# Patient Record
Sex: MALE | Race: BLACK OR AFRICAN AMERICAN | NOT HISPANIC OR LATINO | Employment: FULL TIME | URBAN - METROPOLITAN AREA
[De-identification: names, ages, dates, MRNs, and addresses within clinical notes are randomized per-mention and may not be internally consistent; named-entity substitution may affect disease eponyms.]

---

## 2017-03-09 ENCOUNTER — OFFICE VISIT (OUTPATIENT)
Dept: INTERNAL MEDICINE | Facility: CLINIC | Age: 47
End: 2017-03-09

## 2017-03-09 ENCOUNTER — TELEPHONE (OUTPATIENT)
Dept: INTERNAL MEDICINE | Facility: CLINIC | Age: 47
End: 2017-03-09

## 2017-03-09 VITALS
HEART RATE: 78 BPM | BODY MASS INDEX: 37.19 KG/M2 | DIASTOLIC BLOOD PRESSURE: 87 MMHG | SYSTOLIC BLOOD PRESSURE: 142 MMHG | HEIGHT: 71 IN | WEIGHT: 265.63 LBS | OXYGEN SATURATION: 97 %

## 2017-03-09 DIAGNOSIS — Z00.00 ANNUAL PHYSICAL EXAM: Primary | ICD-10-CM

## 2017-03-09 DIAGNOSIS — N18.4 STAGE 4 CHRONIC KIDNEY DISEASE: ICD-10-CM

## 2017-03-09 DIAGNOSIS — N18.4 STAGE 4 CHRONIC KIDNEY DISEASE: Primary | ICD-10-CM

## 2017-03-09 DIAGNOSIS — E55.9 VITAMIN D DEFICIENCY: ICD-10-CM

## 2017-03-09 PROCEDURE — 99999 PR PBB SHADOW E&M-NEW PATIENT-LVL III: CPT | Mod: PBBFAC,,, | Performed by: INTERNAL MEDICINE

## 2017-03-09 PROCEDURE — 99386 PREV VISIT NEW AGE 40-64: CPT | Mod: S$PBB,,, | Performed by: INTERNAL MEDICINE

## 2017-03-09 PROCEDURE — 99203 OFFICE O/P NEW LOW 30 MIN: CPT | Mod: PBBFAC | Performed by: INTERNAL MEDICINE

## 2017-03-09 RX ORDER — FUROSEMIDE 20 MG/1
20 TABLET ORAL DAILY
Qty: 30 TABLET | Refills: 3 | Status: SHIPPED | OUTPATIENT
Start: 2017-03-09 | End: 2017-09-06 | Stop reason: SDUPTHER

## 2017-03-09 RX ORDER — ERGOCALCIFEROL 1.25 MG/1
50000 CAPSULE ORAL
Qty: 12 CAPSULE | Refills: 3 | Status: SHIPPED | OUTPATIENT
Start: 2017-03-09 | End: 2019-11-15 | Stop reason: SDUPTHER

## 2017-03-09 RX ORDER — CALCITRIOL 0.25 UG/1
0.25 CAPSULE ORAL DAILY
COMMUNITY
End: 2017-03-09 | Stop reason: SDUPTHER

## 2017-03-09 RX ORDER — SODIUM BICARBONATE 650 MG/1
650 TABLET ORAL 4 TIMES DAILY
COMMUNITY
End: 2017-03-09

## 2017-03-09 RX ORDER — SODIUM BICARBONATE 650 MG/1
1300 TABLET ORAL 2 TIMES DAILY
Qty: 120 TABLET | Refills: 3 | Status: SHIPPED | OUTPATIENT
Start: 2017-03-09 | End: 2017-11-11 | Stop reason: SDUPTHER

## 2017-03-09 RX ORDER — ERGOCALCIFEROL 1.25 MG/1
50000 CAPSULE ORAL
COMMUNITY
End: 2017-03-09 | Stop reason: SDUPTHER

## 2017-03-09 RX ORDER — LISINOPRIL 40 MG/1
40 TABLET ORAL DAILY
COMMUNITY
End: 2017-03-09 | Stop reason: SDUPTHER

## 2017-03-09 RX ORDER — CALCITRIOL 0.25 UG/1
0.25 CAPSULE ORAL DAILY
Qty: 30 CAPSULE | Refills: 3 | Status: SHIPPED | OUTPATIENT
Start: 2017-03-09 | End: 2017-09-06 | Stop reason: SDUPTHER

## 2017-03-09 RX ORDER — LISINOPRIL 40 MG/1
40 TABLET ORAL DAILY
Qty: 30 TABLET | Refills: 3 | Status: SHIPPED | OUTPATIENT
Start: 2017-03-09 | End: 2017-09-06 | Stop reason: SDUPTHER

## 2017-03-09 RX ORDER — FUROSEMIDE 20 MG/1
20 TABLET ORAL DAILY
COMMUNITY
End: 2017-03-09 | Stop reason: SDUPTHER

## 2017-03-09 NOTE — Clinical Note
Dr. Cantu,  I saw this patient today to establish care as PCP - [facility]  He is seeing you on 4/10/17 to establish care.  He has CKD stage 4 from hypertension - it sounds like [facility] was discussing dialysis in the future.  I have ordered some basic labs for him to have drawn but wanted to check with you to see if you wanted me to add anything specific.  He does not plan to have labs drawn until after 4/1/17 when his insurance kicks in.  Thanks so much, Katharina Chang

## 2017-03-09 NOTE — TELEPHONE ENCOUNTER
----- Message from Misti Cantu MD sent at 3/9/2017  5:36 PM CST -----  I added some labs when he has your set completed. thanks  ----- Message -----     From: Katharina Chang MD     Sent: 3/9/2017   1:00 PM       To: MD Dr. Pepe Wray,    I saw this patient today to establish care as PCP - [facility].  He is seeing you on 4/10/17 to establish care.  He has CKD stage 4 from hypertension - it sounds like [facility] was discussing dialysis in the future.  I have ordered some basic labs for him to have drawn but wanted to check with you to see if you wanted me to add anything specific.  He does not plan to have labs drawn until after 4/1/17 when his insurance kicks in.    Thanks so much,  Katharina Chang

## 2017-03-09 NOTE — TELEPHONE ENCOUNTER
Please call patient and let him know that Dr. Cantu (his future nephrologist) has also ordered some labs for him to have drawn prior to his appointment.  He can have my labs and her labs drawn all on the same day.  He will need to be fasting.  Please call him to schedule this lab appointment.

## 2017-03-09 NOTE — PROGRESS NOTES
Internal Medicine    Subjective:      Patient ID: Misael Owens is a 47 y.o. male.    Chief Complaint: Establish Care    HPI Comments: Presents to establish care.  [facility]  Dr. Bailey was nephrologist.      CKD stage 4:  Diagnosed 10/2015.  Woke up with swelling and went to ER.  Admitted and had kidney biopsy.  States he was diagnosed with CKD stage 4 due to hypertension.  Currently taking Calcitriol 0.25mg daily, Ergocalciferol 50,000 units weekly, Lasix 20mg daily, lisinopril 40mg daily, sodium bicarb 1300mg BID.  He needs refills of all medications and referral to Nephrology.  Girlfriend states that his nephrologist at [facility] was discussing possible dialysis.  He denies complaints today.  Feels his medications are working well.  Denies shortness of breath, palpitations, chest pain, or LE edema.  Denies trouble with urination.        Review of Systems   Constitutional: Negative for appetite change, chills, fatigue, fever and unexpected weight change.   HENT: Negative for congestion, ear pain, hearing loss, rhinorrhea, sore throat and trouble swallowing.    Eyes: Negative for visual disturbance.   Respiratory: Negative for cough, chest tightness, shortness of breath and wheezing.    Cardiovascular: Negative for chest pain, palpitations and leg swelling.   Gastrointestinal: Negative for abdominal pain, blood in stool, constipation, diarrhea, nausea and vomiting.   Genitourinary: Negative for dysuria, frequency, hematuria and urgency.   Musculoskeletal: Negative for arthralgias and myalgias.   Skin: Negative for rash and wound.   Neurological: Negative for dizziness, weakness, numbness and headaches.   Hematological: Negative for adenopathy.   Psychiatric/Behavioral: Negative for behavioral problems.       Past Medical History:   Diagnosis Date    CKD (chronic kidney disease), stage IV     due to hypertension     Past Surgical History:   Procedure Laterality Date    RENAL BIOPSY       Family  "History   Problem Relation Age of Onset    Hypertension Mother     Diabetes Mother     Lung cancer Maternal Uncle     Heart disease Other     Diabetes Other     Stroke Neg Hx      Social History   Substance Use Topics    Smoking status: Never Smoker    Smokeless tobacco: Never Used    Alcohol use No       Medications and allergies reviewed.     Objective:     BP (!) 142/87  Pulse 78  Ht 5' 11" (1.803 m)  Wt 120.5 kg (265 lb 10.5 oz)  SpO2 97%  BMI 37.05 kg/m2  Physical Exam   Constitutional: He is oriented to person, place, and time. He appears well-developed and well-nourished. No distress.   HENT:   Head: Normocephalic and atraumatic.   Eyes: Conjunctivae and EOM are normal. No scleral icterus.   Neck: No thyromegaly present.   Cardiovascular: Normal rate and regular rhythm.  Exam reveals no gallop and no friction rub.    No murmur heard.  Pulmonary/Chest: Effort normal and breath sounds normal. No respiratory distress. He has no wheezes. He has no rales.   Abdominal: Soft. Bowel sounds are normal. He exhibits no distension and no mass. There is no tenderness. There is no rebound and no guarding.   Musculoskeletal: He exhibits no edema or tenderness.   Lymphadenopathy:     He has no cervical adenopathy.   Neurological: He is alert and oriented to person, place, and time. No cranial nerve deficit.   Skin: No rash noted. No erythema.   Psychiatric: He has a normal mood and affect. His behavior is normal.       Assessment:     1. Annual physical exam    2. Stage 4 chronic kidney disease        Plan:   Misael was seen today for establish care.    Diagnoses and all orders for this visit:    Annual physical exam  -     CBC auto differential; Future; Expected date: 3/9/17  -     Comprehensive metabolic panel; Future; Expected date: 3/9/17  -     Hemoglobin A1c; Future; Expected date: 3/9/17  -     Vitamin D; Future; Expected date: 3/9/17  -     Lipid panel; Future; Expected date: 3/9/17  -     TSH; Future; " Expected date: 3/9/17    Stage 4 chronic kidney disease  -     Ambulatory Referral to Nephrology  -     calcitRIOL (ROCALTROL) 0.25 MCG Cap; Take 1 capsule (0.25 mcg total) by mouth once daily.  -     ergocalciferol (ERGOCALCIFEROL) 50,000 unit Cap; Take 1 capsule (50,000 Units total) by mouth every 7 days.  -     furosemide (LASIX) 20 MG tablet; Take 1 tablet (20 mg total) by mouth once daily.  -     lisinopril (PRINIVIL,ZESTRIL) 40 MG tablet; Take 1 tablet (40 mg total) by mouth once daily.  -     sodium bicarbonate 650 MG tablet; Take 2 tablets (1,300 mg total) by mouth 2 (two) times daily.    Health maintenance reviewed with patient.     Return in about 3 months (around 6/9/2017).    Katharina Chang MD  Internal Medicine  [facility]

## 2017-03-09 NOTE — MR AVS SNAPSHOT
[facility]  [address]  [facility] [address]  Phone: [phone]  Fax: [phone]                  Misael Owens   3/9/2017 9:00 AM   Office Visit    Description:  Male : 1970   Provider:  Katharina Chang MD   Department:  [facility]           Reason for Visit     Establish Care           Diagnoses this Visit        Comments    Annual physical exam    -  Primary     Stage 4 chronic kidney disease                To Do List           Future Appointments        Provider Department Dept Phone    4/10/2017 8:30 AM Misti Cantu MD [facility] - Nephrology [phone]    2017 8:00 AM Katharina Chang MD [facility] Internal Medicine [phone]      Goals (5 Years of Data)     None      Follow-Up and Disposition     Return in about 3 months (around 2017).       These Medications        Disp Refills Start End    calcitRIOL (ROCALTROL) 0.25 MCG Cap 30 capsule 3 3/9/2017     Take 1 capsule (0.25 mcg total) by mouth once daily. - Oral    Pharmacy: [id number] [place] - [address] Ph #: [phone]       ergocalciferol (ERGOCALCIFEROL) 50,000 unit Cap 12 capsule 3 3/9/2017     Take 1 capsule (50,000 Units total) by mouth every 7 days. - Oral    Ph[facility]acy: [id number] - [address] Ph #: [phone]       furosemide (LASIX) 20 MG tablet 30 tablet 3 3/9/2017     Take 1 tablet (20 mg total) by mouth once daily. - Oral    Ph[facility]acy: [id number] - [address] Ph #: [phone]       lisinopril (PRINIVIL,ZESTRIL) 40 MG tablet 30 tablet 3 3/9/2017     Take 1 tablet (40 mg total) by mouth once daily. - Oral    Ph[facility]acy: [id number] - [address] Ph #: [phone]       sodium bicarbonate 650 MG tablet 120 tablet 3 3/9/2017 3/9/2018    Take 2 tablets (1,300 mg total) by mouth 2 (two) times daily. - Oral    Pharmacy: [id number] - [place] [address]  [facility] #: [phone]         [facility] On Call     [facility] On Call Nurse Care Line - 24/7 Assistance  Registered nurses in the [facility] On Call Center provide clinical advisement, health education, appointment booking, and other advisory services.  Call for this free service at [phone].             Medications           Message regarding Medications     Verify the changes and/or additions to your medication regime listed below are the same as discussed with your clinician today.  If any of these changes or additions are incorrect, please notify your healthcare provider.        START taking these NEW medications        Refills    calcitRIOL (ROCALTROL) 0.25 MCG Cap 3    Sig: Take 1 capsule (0.25 mcg total) by mouth once daily.    Class: Normal    Route: Oral    ergocalciferol (ERGOCALCIFEROL) 50,000 unit Cap 3    Sig: Take 1 capsule (50,000 Units total) by mouth every 7 days.    Class: Normal    Route: Oral    furosemide (LASIX) 20 MG tablet 3    Sig: Take 1 tablet (20 mg total) by mouth once daily.    Class: Normal    Route: Oral    lisinopril (PRINIVIL,ZESTRIL) 40 MG tablet 3    Sig: Take 1 tablet (40 mg total) by mouth once daily.    Class: Normal    Route: Oral    sodium bicarbonate 650 MG tablet 3    Sig: Take 2 tablets (1,300 mg total) by mouth 2 (two) times daily.    Class: Normal    Route: Oral           Verify that the below list of medications is an accurate representation of the medications you are currently taking.  If none reported, the list may be blank. If incorrect, please contact your healthcare provider. Carry this list with you in case of emergency.           Current Medications     calcitRIOL (ROCALTROL) 0.25 MCG Cap Take 1 capsule (0.25 mcg total) by mouth once daily.    ergocalciferol (ERGOCALCIFEROL) 50,000 unit Cap Take 1 capsule (50,000 Units total) by mouth every 7 days.    furosemide (LASIX) 20 MG tablet Take 1 tablet (20 mg total) by mouth once daily.    lisinopril  "(PRINIVIL,ZESTRIL) 40 MG tablet Take 1 tablet (40 mg total) by mouth once daily.    sodium bicarbonate 650 MG tablet Take 2 tablets (1,300 mg total) by mouth 2 (two) times daily.           Clinical Reference Information           Your Vitals Were     BP Pulse Height Weight SpO2 BMI    142/87 78 5' 11" (1.803 m) 120.5 kg (265 lb 10.5 oz) 97% 37.05 kg/m2      Blood Pressure          Most Recent Value    BP  (!)  142/87      Allergies as of 3/9/2017     No Known Allergies      Immunizations Administered on Date of Encounter - 3/9/2017     None      Orders Placed During Today's Visit      Normal Orders This Visit    Ambulatory Referral to Nephrology     Future Labs/Procedures Expected by Expires    CBC auto differential  3/9/2017 5/8/2018    Comprehensive metabolic panel  3/9/2017 5/8/2018    Hemoglobin A1c  3/9/2017 5/8/2018    Lipid panel  3/9/2017 5/8/2018    TSH  3/9/2017 5/8/2018    Vitamin D  3/9/2017 5/8/2018      [facility] Sign-Up     Activating your [facility] account is as easy as 1-2-3!     1) Visit [email or web address], select Sign Up Now, enter this activation code and your date of birth, then select Next.  [email or web address]  Expires: 4/23/2017  9:42 AM      2) Create a username and password to use when you visit [facility] in the future and select a security question in case you lose your password and select Next.    3) Enter your e-mail address and click Sign Up!    Additional Information  If you have questions, please e-mail [email or web address] or call [phone] to talk to our [facility] staff. Remember, [facility] is NOT to be used for urgent needs. For medical emergencies, dial 911.         Language Assistance Services     ATTENTION: Language assistance services are available, free of charge. Please call [phone].      ATENCIÓN: Si habla español, tiene a arita disposición servicios gratuitos de asistencia lingüística. Llame al [phone].     CHÚ Ý: N?u b?n nói Ti?ng Vi?t, có các d?ch v? h? tr? ngôn " ng? mi?n phí dành cho b?n. G?i s? [phone].         [facility] complies with applicable Federal civil rights laws and does not discriminate on the basis of race, color, national origin, age, disability, or sex.

## 2017-09-06 DIAGNOSIS — N18.4 STAGE 4 CHRONIC KIDNEY DISEASE: ICD-10-CM

## 2017-09-07 RX ORDER — FUROSEMIDE 20 MG/1
20 TABLET ORAL DAILY
Qty: 30 TABLET | Refills: 3 | Status: SHIPPED | OUTPATIENT
Start: 2017-09-07 | End: 2020-01-06 | Stop reason: CLARIF

## 2017-09-07 RX ORDER — CALCITRIOL 0.25 UG/1
0.25 CAPSULE ORAL DAILY
Qty: 30 CAPSULE | Refills: 3 | Status: SHIPPED | OUTPATIENT
Start: 2017-09-07 | End: 2020-02-05

## 2017-09-07 RX ORDER — LISINOPRIL 40 MG/1
40 TABLET ORAL DAILY
Qty: 30 TABLET | Refills: 3 | Status: SHIPPED | OUTPATIENT
Start: 2017-09-07 | End: 2019-12-09

## 2017-11-11 DIAGNOSIS — N18.4 STAGE 4 CHRONIC KIDNEY DISEASE: ICD-10-CM

## 2017-11-13 RX ORDER — SODIUM BICARBONATE 650 MG/1
TABLET ORAL
Qty: 120 TABLET | Refills: 0 | Status: SHIPPED | OUTPATIENT
Start: 2017-11-13 | End: 2020-03-17 | Stop reason: SDUPTHER

## 2019-11-15 ENCOUNTER — OFFICE VISIT (OUTPATIENT)
Dept: INTERNAL MEDICINE | Facility: CLINIC | Age: 49
End: 2019-11-15
Payer: MEDICAID

## 2019-11-15 ENCOUNTER — IMMUNIZATION (OUTPATIENT)
Dept: INTERNAL MEDICINE | Facility: CLINIC | Age: 49
End: 2019-11-15
Payer: MEDICAID

## 2019-11-15 VITALS
SYSTOLIC BLOOD PRESSURE: 130 MMHG | HEART RATE: 82 BPM | OXYGEN SATURATION: 99 % | DIASTOLIC BLOOD PRESSURE: 82 MMHG | WEIGHT: 258.38 LBS | BODY MASS INDEX: 36.04 KG/M2

## 2019-11-15 DIAGNOSIS — N18.4 STAGE 4 CHRONIC KIDNEY DISEASE: ICD-10-CM

## 2019-11-15 DIAGNOSIS — D63.1 ANEMIA DUE TO STAGE 5 CHRONIC KIDNEY DISEASE, NOT ON CHRONIC DIALYSIS: ICD-10-CM

## 2019-11-15 DIAGNOSIS — E55.9 VITAMIN D DEFICIENCY: ICD-10-CM

## 2019-11-15 DIAGNOSIS — I12.0 CKD STAGE 5 SECONDARY TO HYPERTENSION: Primary | ICD-10-CM

## 2019-11-15 DIAGNOSIS — N18.5 ANEMIA DUE TO STAGE 5 CHRONIC KIDNEY DISEASE, NOT ON CHRONIC DIALYSIS: ICD-10-CM

## 2019-11-15 DIAGNOSIS — I10 ESSENTIAL HYPERTENSION: ICD-10-CM

## 2019-11-15 DIAGNOSIS — N18.5 CKD STAGE 5 SECONDARY TO HYPERTENSION: Primary | ICD-10-CM

## 2019-11-15 DIAGNOSIS — N25.81 SECONDARY HYPERPARATHYROIDISM: ICD-10-CM

## 2019-11-15 PROCEDURE — 99214 OFFICE O/P EST MOD 30 MIN: CPT | Mod: S$PBB,,, | Performed by: INTERNAL MEDICINE

## 2019-11-15 PROCEDURE — 90686 IIV4 VACC NO PRSV 0.5 ML IM: CPT | Mod: PBBFAC

## 2019-11-15 PROCEDURE — 99214 PR OFFICE/OUTPT VISIT, EST, LEVL IV, 30-39 MIN: ICD-10-PCS | Mod: S$PBB,,, | Performed by: INTERNAL MEDICINE

## 2019-11-15 PROCEDURE — 99999 PR PBB SHADOW E&M-EST. PATIENT-LVL III: ICD-10-PCS | Mod: PBBFAC,,, | Performed by: INTERNAL MEDICINE

## 2019-11-15 PROCEDURE — 99213 OFFICE O/P EST LOW 20 MIN: CPT | Mod: PBBFAC,25 | Performed by: INTERNAL MEDICINE

## 2019-11-15 PROCEDURE — 99999 PR PBB SHADOW E&M-EST. PATIENT-LVL III: CPT | Mod: PBBFAC,,, | Performed by: INTERNAL MEDICINE

## 2019-11-15 RX ORDER — FUROSEMIDE 20 MG/1
20 TABLET ORAL DAILY
Qty: 90 TABLET | Refills: 1 | Status: SHIPPED | OUTPATIENT
Start: 2019-11-15 | End: 2019-12-09 | Stop reason: DRUGHIGH

## 2019-11-15 RX ORDER — FUROSEMIDE 20 MG/1
20 TABLET ORAL DAILY
COMMUNITY
End: 2019-11-15 | Stop reason: SDUPTHER

## 2019-11-15 RX ORDER — LABETALOL 200 MG/1
200 TABLET, FILM COATED ORAL EVERY 12 HOURS
Refills: 4 | COMMUNITY
Start: 2019-10-17 | End: 2021-01-15 | Stop reason: SDUPTHER

## 2019-11-15 RX ORDER — ERGOCALCIFEROL 1.25 MG/1
50000 CAPSULE ORAL
Qty: 12 CAPSULE | Refills: 3 | Status: SHIPPED | OUTPATIENT
Start: 2019-11-15 | End: 2021-01-15 | Stop reason: SDUPTHER

## 2019-11-15 NOTE — PROGRESS NOTES
Internal Medicine    Subjective:      Patient ID: Misael Owens is a 49 y.o. male.    Chief Complaint: Annual Exam    HPI:  Patient presents for follow up appointment.  The patient's last visit with me was on 3/9/2017.  He presents today accompanied by his wife.    Was living in [place] and just moved back to [place].  Wanting to re-establish at [facility].      CKD stage 5, due to HTN:  [facility] [place] (requesting records today).  He reports no recent changes.  Last GFR 15 around February per wife.  Diagnosed 10/2015.  Woke up with swelling and went to ER.  Admitted and had kidney biopsy.  Diagnosed with CKD stage 5 due to hypertension.  Currently taking Calcitriol 0.25mg daily, Ergocalciferol 50,000 units weekly, Lasix 20mg daily, Labetalol 200mg BID, and sodium bicarb 1300mg BID (only taking 1 tablet twice a day currently).  No longer on lisinopril.    HTN:  Taking Labetalol 200mg BID and Lasix 20mg daily.      Secondary hyperparathyroidism.  Taking vitamin D and calcitriol.      Anemia of chronic kidney disease    Vitamin D deficiency:  Taking Ergocalciferol 50,000 units weekly.    Last Nephrology note at [facility] on 4/13/16:  1. 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 HTN: controlled  -continue lasix 20mg & lisinopril 40mg every day    3. Anemia of CKD: clinically stable 12/2015 iron panel(iron 78, transferrin 217, TIBC 282, tsat 28%, ferritin 573)  -continue to monitor    4. Secondary hyperparathyroidism: PTH= 184, vitamin D 25 18.4 on 2/2016  -continue calcitriol .25mcg every day & Q1wk ergocalciferol 50,000units      Review of Systems   Constitutional: Negative for appetite change, chills, fatigue, fever and unexpected weight change.   HENT:  Negative for sore throat and trouble swallowing.    Eyes: Negative for visual disturbance.   Respiratory: Negative for cough, chest tightness, shortness of breath and wheezing.    Cardiovascular: Negative for chest pain, palpitations and leg swelling.   Gastrointestinal: Negative for abdominal pain, blood in stool, constipation, diarrhea, nausea and vomiting.   Genitourinary: Negative for difficulty urinating.   Musculoskeletal: Negative for arthralgias and myalgias.        Occasional muscle cramps in legs   Skin: Negative for rash and wound.   Neurological: Negative for dizziness, weakness, numbness and headaches.   Psychiatric/Behavioral: Negative for behavioral problems and confusion.       Past medical history, surgical history, and family medical history reviewed and updated as appropriate.    Medications and allergies reviewed.     Objective:     Vitals:    11/15/19 1108 11/15/19 1226   BP: (!) 142/90 130/82   BP Location: Right arm    Patient Position: Sitting    BP Method: Large (Manual)    Pulse: 82    SpO2: 99%    Weight: 117.2 kg (258 lb 6.1 oz)      Physical Exam   Constitutional: He is oriented to person, place, and time. He appears well-developed and well-nourished. No distress.   HENT:   Head: Normocephalic and atraumatic.   Eyes: Conjunctivae and EOM are normal. No scleral icterus.   Neck: No thyromegaly present.   Cardiovascular: Normal rate and regular rhythm. Exam reveals no gallop and no friction rub.   No murmur heard.  Pulmonary/Chest: Effort normal and breath sounds normal. No respiratory distress. He has no wheezes. He has no rales.   Abdominal: Soft. He exhibits no distension and no mass. There is no tenderness. There is no rebound and no guarding.   Musculoskeletal: He exhibits no edema or tenderness.   Lymphadenopathy:     He has no cervical adenopathy.   Neurological: He is alert and oriented to person, place, and time.   Skin: No rash noted. No erythema.   Psychiatric: He has a normal  mood and affect. His behavior is normal.       RESULTS:   Lab Results   Component Value Date    WBC 7.37 11/15/2019    HGB 11.6 (L) 11/15/2019    HCT 35.6 (L) 11/15/2019    MCV 87 11/15/2019     11/15/2019     BMP  No results found for: NA, K, CL, CO2, BUN, CREATININE, CALCIUM, ANIONGAP, ESTGFRAFRICA, EGFRNONAA  No results found for: ALT, AST, GGT, ALKPHOS, BILITOT  No results found for: TSH  No results found for: LABA1C, HGBA1C      Assessment:     1. CKD stage 5 secondary to hypertension    2. Essential hypertension    3. Vitamin D deficiency    4. Secondary hyperparathyroidism    5. Anemia due to stage 5 chronic kidney disease, not on chronic dialysis        Plan:     *Continue medication/plan as discussed in HPI except for changes discussed below.    Misael was seen today for annual exam.    Diagnoses and all orders for this visit:    CKD stage 5 secondary to hypertension  -     CBC auto differential; Future; Expected date: 11/15/2019  -     Comprehensive metabolic panel; Future; Expected date: 11/15/2019  -     Hemoglobin A1c; Future; Expected date: 11/15/2019  -     Vitamin D; Future; Expected date: 11/15/2019  -     TSH; Future; Expected date: 11/15/2019  -     PTH, intact; Future; Expected date: 11/15/2019  -     LDL Cholesterol, Direct Measurement; Future; Expected date: 11/15/2019  -     Ambulatory Referral to Nephrology  -     Lipid panel; Future; Expected date: 02/15/2020    Essential hypertension  -     Comprehensive metabolic panel; Future; Expected date: 11/15/2019  -     TSH; Future; Expected date: 11/15/2019  -     LDL Cholesterol, Direct Measurement; Future; Expected date: 11/15/2019  -     Lipid panel; Future; Expected date: 02/15/2020    Vitamin D deficiency  -     Vitamin D; Future; Expected date: 11/15/2019    Secondary hyperparathyroidism  -     Vitamin D; Future; Expected date: 11/15/2019  -     PTH, intact; Future; Expected date: 11/15/2019  -     Ambulatory Referral to  Nephrology    Anemia due to stage 5 chronic kidney disease, not on chronic dialysis  -     CBC auto differential; Future; Expected date: 11/15/2019  -     Ambulatory Referral to Nephrology    Health maintenance reviewed with patient.     Follow up in about 3 months (around 2/15/2020).    Katharina Ricci MD  Internal Medicine  [facility]

## 2019-11-18 ENCOUNTER — TELEPHONE (OUTPATIENT)
Dept: INTERNAL MEDICINE | Facility: CLINIC | Age: 49
End: 2019-11-18

## 2019-11-18 NOTE — TELEPHONE ENCOUNTER
----- Message from Katharina Ricci MD sent at 11/15/2019  3:32 PM CST -----  Please call patient and let him know that his kidney function seems to have worsened.  His Creatinine is 9.3 and his GFR is 7 (wife today said it was previously 15 in February).  His bicarb is low, so I recommend that he increase the sodium bicarb to 2 tablets twice daily as instructed by his previous Nephrologist.  He is also slightly anemic.  I would recommend scheduling appointment with a Nephrologist very soon.  I will send a message to the Nephrology Department here and see if they can see him.  However, I am unsure if they are currently taking Medicaid.  We will call him again next week when his other labs are back.

## 2019-11-19 NOTE — TELEPHONE ENCOUNTER
Spoke with pt, notified him of results, pt verbalized understanding. I'm unable to schedule pt with Nephrology, pt will contact department to schedule.

## 2019-12-09 ENCOUNTER — OFFICE VISIT (OUTPATIENT)
Dept: NEPHROLOGY | Facility: CLINIC | Age: 49
End: 2019-12-09
Payer: MEDICAID

## 2019-12-09 ENCOUNTER — TELEPHONE (OUTPATIENT)
Dept: NEPHROLOGY | Facility: CLINIC | Age: 49
End: 2019-12-09

## 2019-12-09 VITALS
BODY MASS INDEX: 35.52 KG/M2 | OXYGEN SATURATION: 98 % | DIASTOLIC BLOOD PRESSURE: 90 MMHG | SYSTOLIC BLOOD PRESSURE: 132 MMHG | HEART RATE: 76 BPM | HEIGHT: 71 IN | WEIGHT: 253.75 LBS

## 2019-12-09 DIAGNOSIS — I10 ESSENTIAL HYPERTENSION: ICD-10-CM

## 2019-12-09 DIAGNOSIS — N18.5 CKD STAGE 5 SECONDARY TO HYPERTENSION: Primary | ICD-10-CM

## 2019-12-09 DIAGNOSIS — I12.0 CKD STAGE 5 SECONDARY TO HYPERTENSION: Primary | ICD-10-CM

## 2019-12-09 DIAGNOSIS — N25.81 SECONDARY HYPERPARATHYROIDISM: ICD-10-CM

## 2019-12-09 DIAGNOSIS — Z01.818 PREOP EXAMINATION: Primary | ICD-10-CM

## 2019-12-09 PROCEDURE — 99205 PR OFFICE/OUTPT VISIT, NEW, LEVL V, 60-74 MIN: ICD-10-PCS | Mod: S$PBB,,, | Performed by: INTERNAL MEDICINE

## 2019-12-09 PROCEDURE — 99999 PR PBB SHADOW E&M-EST. PATIENT-LVL IV: CPT | Mod: PBBFAC,,, | Performed by: INTERNAL MEDICINE

## 2019-12-09 PROCEDURE — 99205 OFFICE O/P NEW HI 60 MIN: CPT | Mod: S$PBB,,, | Performed by: INTERNAL MEDICINE

## 2019-12-09 PROCEDURE — 99999 PR PBB SHADOW E&M-EST. PATIENT-LVL IV: ICD-10-PCS | Mod: PBBFAC,,, | Performed by: INTERNAL MEDICINE

## 2019-12-09 PROCEDURE — 99214 OFFICE O/P EST MOD 30 MIN: CPT | Mod: PBBFAC | Performed by: INTERNAL MEDICINE

## 2019-12-09 RX ORDER — FUROSEMIDE 40 MG/1
40 TABLET ORAL DAILY
Qty: 90 TABLET | Refills: 3 | Status: SHIPPED | OUTPATIENT
Start: 2019-12-09 | End: 2021-01-15 | Stop reason: SDUPTHER

## 2019-12-09 NOTE — TELEPHONE ENCOUNTER
----- Message from Katharina Martini LPN sent at 12/9/2019  9:28 AM CST -----  Pt would like to get in with nephrology on the [facility]  due to that's where they live now former pt of Dr. Moreno/please contact Wendy Small with appt or any questions thanks

## 2019-12-09 NOTE — TELEPHONE ENCOUNTER
I spoke with the patient and informed no available apopintments through 2021.  It would not allow me to try to schedule him as a new patient.

## 2019-12-09 NOTE — PROGRESS NOTES
REASON FOR CONSULTATION:  Evaluation of chronic kidney disease stage IV.    HISTORY OF PRESENT ILLNESS:  This is a 49-year-old -American male with   longstanding history of hypertension diagnosed in 2015 when he had lower   extremity edema, was evaluated for CKD with nephrotic syndrome.  He has never   been in the [facility] system before but was followed at the [place] several   years ago and went to [place] and since then has lost follow up for the   past two years with a nephrologist.  He was biopsied appears to be in 2015 and   was found to have hypertensive kidney disease per the patient and unable to   retrieve the records, although 2016 note stated diabetic glomerulosclerosis   along with questionable FSGS, but the patient does not have diabetes.  It is   unclear if this is correct documentation.  Her blood pressures are mainly in the   130s/80s per the patient.  He has occasional nausea off and on for the last   year, but no vomiting and denies any other uremic symptoms.  He has good   appetite.  No major weight changes.  His creatinine is elevated at 9+.  It is   not clear what his creatinines were prior to that.    PAST MEDICAL HISTORY:  Significant for hypertension diagnosed in 2015, but   likely has had it for a longer period of time, advanced CKD stage IV, nephrotic   syndrome.    SOCIAL HISTORY:  Does not smoke, does not drink.  He is a ,   lives with a friend currently.    FAMILY HISTORY:  No family history of kidney disease.    REVIEW OF SYSTEMS:  Per HPI.    PHYSICAL EXAMINATION:  GENERAL:  Well-nourished, well-developed individual, in no acute distress.  HEENT:  PERRLA, EOMI.  NECK:  No cervical lymphadenopathy.  CARDIOVASCULAR:  Rate and rhythm regular.  No murmurs, gallops or rubs.  LUNGS:  Clear to auscultation bilaterally.  Normal respiratory effort.  ABDOMEN:  Soft, nontender and nondistended.  EXTREMITIES:  No edema.    ASSESSMENT AND PLAN:  This is a  49-year-old -American male with   longstanding history of hypertension and nephrotic syndrome.  He is come in for   evaluation of CKD stage IV.    1.  CKD stage IV, likely secondary to ____ hypertensive nephropathy per the   patient for which he was told about the biopsy that was done in 2015.  He has   advanced kidney disease and does not have access preparation.  We will refer him   to Vascular Surgery for graft, so he could be started on dialysis soon as he is   having nausea off and on.  We will refer him to transplant.  He was educated   about his advanced kidney disease and he is aware of uremic symptoms.  He would   like to do home dialysis, but currently does not have a home.  We will refer him   to [facility] as lives in ____ right now and would be   closer to him.  We will also refer him to ____.  2.  Anemia.  We will recheck.  His hemoglobin recently was 11.6.  3.  Renal osteodystrophy.  His intact PTH is 354.  He is on calcitriol.  Vitamin   D levels are normal.  4.  Hypertension.  Blood pressures are stable currently on labetalol and we will   increase his Lasix from 20 mg to 40 mg for better blood pressure control and 20   mg a small dose for his level of kidney function.  He was educated about   low-potassium, low-phosphorus diet and was given the list.  We will refer him to   see Dr. Simental or Dr. Klein in the [place] so he can be transitioned to   dialysis closer to where he is living currently.      SOFY/PACO  dd: 12/09/2019 09:17:51 (CST)  td: 12/10/2019 00:49:22 (CST)  Doc ID   #[id number]  Job ID #[id number]    CC:     [id number]

## 2019-12-09 NOTE — TELEPHONE ENCOUNTER
----- Message from Bianca Martini sent at 12/9/2019 12:38 PM CST -----  Contact: pt  .Type:  Patient Returning Call    Who Called:pt  Who Left Message for Patient:Mer  Does the patient know what this is regarding?:no  Would the patient rather a call back or a response via [facility]? Call back  Best Call Back Number:[phone]  Additional Information:

## 2019-12-10 ENCOUNTER — TELEPHONE (OUTPATIENT)
Dept: NEPHROLOGY | Facility: CLINIC | Age: 49
End: 2019-12-10

## 2019-12-10 NOTE — TELEPHONE ENCOUNTER
Preferred Name:   Misael Owens  Male, 49 y.o., 1970  Phone:   [phone] (M)  PCP:   Katharina Ricci MD  Language:   English  Need Interp:   No  Allergies Last Reviewed:   12/09/19  Allergies:   No Known Allergies  Health Maintenance:   Due  Primary Ins.:   MEDICAID  MRN:   [id number]  Pt Comm Pref:   Patient Portal, Mail  Last MyChart Login:   12/10/2019 8:47 AM  Next Appt:   With Vascular Surgery  12/18/2019 at 2:00 PM  My Sticky Note:     Specialty Comments:     Patient Calls     Matthew Moreno DO routed conversation to Josh Castro Staff 4 hours ago (10:50 AM)      Matthew Moreno DO 4 hours ago (10:50 AM)         Please let him know that his kidney function is similiar to lat time at 7%.         Documentation

## 2019-12-10 NOTE — TELEPHONE ENCOUNTER
----- Message from Delphine Eduardo sent at 12/10/2019  8:21 AM CST -----  Contact: Destinee at [facility]  Type: Needs Medical Advice    Who Called: Destinee at [facility]  Best Call Back Number: [phone]  Additional Information: Calling to speak to the nurse about getting patient seen in the office.

## 2019-12-10 NOTE — TELEPHONE ENCOUNTER
Tong roberson.  This is a patient I saw yesterday as a new consult and he is close to HD and [facility] is closer to him as he lives in [place] and they would like to see someone there to make it easier.  We were trying to get him in with you or elicia for jorge luis  but was told that you were booked-is there any way you can see him in January.  Thanks.

## 2019-12-16 ENCOUNTER — TELEPHONE (OUTPATIENT)
Dept: NEPHROLOGY | Facility: CLINIC | Age: 49
End: 2019-12-16

## 2019-12-16 ENCOUNTER — PATIENT MESSAGE (OUTPATIENT)
Dept: NEPHROLOGY | Facility: CLINIC | Age: 49
End: 2019-12-16

## 2019-12-16 NOTE — TELEPHONE ENCOUNTER
Preferred Name:   Misael wOens  Male, 49 y.o., 1970  Phone:   [phone] (M)  PCP:   Katharina Ricci MD  Language:   English  Need Interp:   No  Allergies Last Reviewed:   12/09/19  Allergies:   No Known Allergies  Health Maintenance:   Due  Primary Ins.:   MEDICAID  MRN:   [id number]  Pt Comm Pref:   Patient Portal, Mail  Last MyChart Login:   12/16/2019 4:16 PM  Next Appt:   With Vascular Surgery  12/18/2019 at 2:00 PM  My Sticky Note:     Specialty Comments:     Visit Follow-Up   Misael Owens   Sent: Mon December 16, 2019  8:27 AM   To: ALYSSA Castro Staff   Visit Follow-Up     Misael Owens Shirisha, DO 7 hours ago (8:27 AM)         I received a call from the Nephrologists on the [facility] and they are not able to accommodate me.  I would like to continue to use you as my Nephrologist. Do we need to set up an appointment earlier than the on the scheduled now in February?          Responsible Party     Pool - Bodana Shirisha Staff  No one has taken responsibility for this message.

## 2019-12-18 ENCOUNTER — HOSPITAL ENCOUNTER (OUTPATIENT)
Dept: VASCULAR SURGERY | Facility: CLINIC | Age: 49
Discharge: HOME | End: 2019-12-18
Attending: SURGERY
Payer: MEDICAID

## 2019-12-18 ENCOUNTER — HOSPITAL ENCOUNTER (OUTPATIENT)
Dept: RADIOLOGY | Facility: HOSPITAL | Age: 49
Discharge: HOME | End: 2019-12-18
Attending: INTERNAL MEDICINE
Payer: MEDICAID

## 2019-12-18 ENCOUNTER — OFFICE VISIT (OUTPATIENT)
Dept: VASCULAR SURGERY | Facility: CLINIC | Age: 49
End: 2019-12-18
Attending: SURGERY
Payer: MEDICAID

## 2019-12-18 ENCOUNTER — HOSPITAL ENCOUNTER (OUTPATIENT)
Dept: RADIOLOGY | Facility: HOSPITAL | Age: 49
Discharge: HOME | End: 2019-12-18
Attending: SURGERY
Payer: MEDICAID

## 2019-12-18 VITALS
SYSTOLIC BLOOD PRESSURE: 134 MMHG | BODY MASS INDEX: 35.71 KG/M2 | WEIGHT: 255.06 LBS | DIASTOLIC BLOOD PRESSURE: 84 MMHG | TEMPERATURE: 98 F | HEART RATE: 90 BPM | HEIGHT: 71 IN

## 2019-12-18 DIAGNOSIS — I12.0 CKD STAGE 5 SECONDARY TO HYPERTENSION: ICD-10-CM

## 2019-12-18 DIAGNOSIS — N18.5 CKD STAGE 5 SECONDARY TO HYPERTENSION: ICD-10-CM

## 2019-12-18 DIAGNOSIS — I10 ESSENTIAL HYPERTENSION: ICD-10-CM

## 2019-12-18 DIAGNOSIS — Z01.818 PREOP EXAMINATION: Primary | ICD-10-CM

## 2019-12-18 DIAGNOSIS — N25.81 SECONDARY HYPERPARATHYROIDISM: ICD-10-CM

## 2019-12-18 DIAGNOSIS — N18.5 CKD (CHRONIC KIDNEY DISEASE) STAGE 5, GFR LESS THAN 15 ML/MIN: Primary | ICD-10-CM

## 2019-12-18 DIAGNOSIS — Z01.818 PREOP EXAMINATION: ICD-10-CM

## 2019-12-18 PROCEDURE — 71046 X-RAY EXAM CHEST 2 VIEWS: CPT | Mod: TC

## 2019-12-18 PROCEDURE — 99999 PR PBB SHADOW E&M-EST. PATIENT-LVL IV: ICD-10-PCS | Mod: PBBFAC,,, | Performed by: SURGERY

## 2019-12-18 PROCEDURE — 71046 X-RAY EXAM CHEST 2 VIEWS: CPT | Mod: 26,,, | Performed by: RADIOLOGY

## 2019-12-18 PROCEDURE — 76770 US EXAM ABDO BACK WALL COMP: CPT | Mod: 26,,, | Performed by: RADIOLOGY

## 2019-12-18 PROCEDURE — 93970 EXTREMITY STUDY: CPT | Mod: 26,S$PBB,, | Performed by: SURGERY

## 2019-12-18 PROCEDURE — 71046 XR CHEST PA AND LATERAL: ICD-10-PCS | Mod: 26,,, | Performed by: RADIOLOGY

## 2019-12-18 PROCEDURE — 99204 PR OFFICE/OUTPT VISIT, NEW, LEVL IV, 45-59 MIN: ICD-10-PCS | Mod: S$PBB,,, | Performed by: SURGERY

## 2019-12-18 PROCEDURE — 93970 PR US DUPLEX, UPPER OR LOWER EXT VENOUS,COMPLETE BILAT: ICD-10-PCS | Mod: 26,S$PBB,, | Performed by: SURGERY

## 2019-12-18 PROCEDURE — 99204 OFFICE O/P NEW MOD 45 MIN: CPT | Mod: S$PBB,,, | Performed by: SURGERY

## 2019-12-18 PROCEDURE — 99999 PR PBB SHADOW E&M-EST. PATIENT-LVL IV: CPT | Mod: PBBFAC,,, | Performed by: SURGERY

## 2019-12-18 PROCEDURE — 93970 EXTREMITY STUDY: CPT | Mod: PBBFAC | Performed by: SURGERY

## 2019-12-18 PROCEDURE — 76770 US KIDNEY: ICD-10-PCS | Mod: 26,,, | Performed by: RADIOLOGY

## 2019-12-18 PROCEDURE — 76770 US EXAM ABDO BACK WALL COMP: CPT | Mod: TC

## 2019-12-18 PROCEDURE — 99214 OFFICE O/P EST MOD 30 MIN: CPT | Mod: PBBFAC,25 | Performed by: SURGERY

## 2019-12-18 NOTE — PROGRESS NOTES
Misael Owens  12/18/2019    HPI:  Mr Owens is a 49 y.o. male with a h/o HTN and CKD who is here today for evaluation of AV fistula/graft placement.    He was first diagnosed with CKD in 2015 and has been following nephrology at [facility] then to [place] and now his new nephrologist is Dr. Moreno at [facility].  The patient states that he is right handed.  No major surgeries to the hands or arms.  No radiation to the chest or arms.  Never had a central line placed in either internal jugular.  He is still producing urine urinating 6-7x a day.  His last GFR on 12/9 was 7.4 placing him at CKD stage V.  He denies any shortness of breath or chest pain.  He does have leg swelling which results in cramps.    No MI/stroke  Tobacco use: Never    Past Medical History:   Diagnosis Date    CKD (chronic kidney disease), stage IV     due to hypertension     Past Surgical History:   Procedure Laterality Date    RENAL BIOPSY       Family History   Problem Relation Age of Onset    Hypertension Mother     Diabetes Mother     Lung cancer Maternal Uncle     Heart disease Other     Diabetes Other     Stroke Neg Hx      Social History     Socioeconomic History    Marital status: Significant Other     Spouse name: Not on file    Number of children: Not on file    Years of education: Not on file    Highest education level: Not on file   Occupational History    Not on file   Social Needs    Financial resource strain: Not on file    Food insecurity:     Worry: Not on file     Inability: Not on file    Transportation needs:     Medical: Not on file     Non-medical: Not on file   Tobacco Use    Smoking status: Never Smoker    Smokeless tobacco: Never Used   Substance and Sexual Activity    Alcohol use: No    Drug use: No    Sexual activity: Not on file   Lifestyle    Physical activity:     Days per week: Not on file     Minutes per session: Not on file    Stress: Not on file   Relationships    Social connections:      Talks on phone: Not on file     Gets together: Not on file     Attends Church service: Not on file     Active member of club or organization: Not on file     Attends meetings of clubs or organizations: Not on file     Relationship status: Not on file   Other Topics Concern    Not on file   Social History Narrative    Not on file       Current Outpatient Medications:     calcitRIOL (ROCALTROL) 0.25 MCG Cap, Take 1 capsule (0.25 mcg total) by mouth once daily., Disp: 30 capsule, Rfl: 3    ergocalciferol (ERGOCALCIFEROL) 50,000 unit Cap, Take 1 capsule (50,000 Units total) by mouth every 7 days., Disp: 12 capsule, Rfl: 3    furosemide (LASIX) 40 MG tablet, Take 1 tablet (40 mg total) by mouth once daily., Disp: 90 tablet, Rfl: 3    labetalol (NORMODYNE) 200 MG tablet, Take 200 mg by mouth every 12 (twelve) hours., Disp: , Rfl: 4    sodium bicarbonate 650 MG tablet, TAKE TWO TABLETS BY MOUTH TWICE DAILY, Disp: 120 tablet, Rfl: 0    flu vacc [id number] 6mos up,PF, 60 mcg (15 mcg x 4)/0.5 mL Syrg, inject as directed (Patient not taking: Reported on 2019), Disp: 0.5 mL, Rfl: 0    furosemide (LASIX) 20 MG tablet, Take 1 tablet (20 mg total) by mouth once daily. (Patient not taking: Reported on 2019), Disp: 30 tablet, Rfl: 3     REVIEW OF SYSTEMS:  General: negative, occassional chills; Respiratory: no cough, shortness of breath, or wheezing; Cardiovascular: no chest pain or dyspnea on exertion; Gastrointestinal: some abdominal pain/muscle spasms, no change in bowel habits, or bloody stools; Genito-Urinary: no dysuria, trouble voiding, or hematuria; Musculoskeletal: no weakness or decreased range of motion, Neurological: no TIA or stroke symptoms; Psychiatric: no nervousness, anxiety or depression.    PHYSICAL EXAM:   Right Arm BP - Sittin/84 (19 1515)  Left Arm BP - Sittin/87 (19 1515)  Pulse: 90  Temp: 98.4 °F (36.9 °C)    General appearance: Alert, well-appearing, and in no  distress.  Oriented to person, place, and time  Neurological: Normal speech, no focal findings noted; CN II - XII grossly intact  Musculoskeletal:Digits/nail without cyanosis/clubbing.  Normal muscle strength/tone.  Neck: Supple, no significant adenopathy, no carotid bruit can be auscultated  Chest:Clear to auscultation, no wheezes, rales or rhonchi, symmetric air entry, No use of accessory muscles   Cardiac:Normal rate and regular rhythm, S1 and S2 normal  Abdomen:Soft, nontender, nondistended, no masses or organomegaly, No rebound tenderness noted; bowel sounds normal, Pulsatile aortic mass is not palpable. No groin adenopathy   Extremities: 2+ femoral pulses bilaterally, 2+ pedal pulses palpable.1+ pre-tibial edema. No ulcerations    LAB RESULTS:  Lab Results   Component Value Date    K 4.9 12/09/2019    K 5.1 11/15/2019    CREATININE 8.7 (H) 12/09/2019    CREATININE 9.3 (H) 11/15/2019     Lab Results   Component Value Date    WBC 7.37 11/15/2019    HCT 36.7 (L) 12/09/2019    HCT 35.6 (L) 11/15/2019     11/15/2019     Lab Results   Component Value Date    HGBA1C 5.2 11/15/2019     IMAGING:  Vein mapping - adequate left cephalic and basilic vein for AVF creation    IMP/PLAN:  49 y.o. male with CKD stage V in need of permanent HD access.  Right handed.  Adequate left cephalic and basilic vein on duplex for AVF creation.  Plan for lyle vs brachiocephalic AVF in the coming weeks.  We discussed the procedure in detail including risks and benefits and he wishes to proceed.     1) plan for LUE AVF creation in the coming weeks.     Derrek Grant MD  Vascular & Endovascular Surgery

## 2019-12-18 NOTE — H&P (VIEW-ONLY)
Misael Owens  12/18/2019    HPI:  Mr Owens is a 49 y.o. male with a h/o HTN and CKD who is here today for evaluation of AV fistula/graft placement.    He was first diagnosed with CKD in 2015 and has been following nephrology at [place] then to [place] and now his new nephrologist is Dr. Moreno at [facility].  The patient states that he is right handed.  No major surgeries to the hands or arms.  No radiation to the chest or arms.  Never had a central line placed in either internal jugular.  He is still producing urine urinating 6-7x a day.  His last GFR on 12/9 was 7.4 placing him at CKD stage V.  He denies any shortness of breath or chest pain.  He does have leg swelling which results in cramps.    No MI/stroke  Tobacco use: Never    Past Medical History:   Diagnosis Date    CKD (chronic kidney disease), stage IV     due to hypertension     Past Surgical History:   Procedure Laterality Date    RENAL BIOPSY       Family History   Problem Relation Age of Onset    Hypertension Mother     Diabetes Mother     Lung cancer Maternal Uncle     Heart disease Other     Diabetes Other     Stroke Neg Hx      Social History     Socioeconomic History    Marital status: Significant Other     Spouse name: Not on file    Number of children: Not on file    Years of education: Not on file    Highest education level: Not on file   Occupational History    Not on file   Social Needs    Financial resource strain: Not on file    Food insecurity:     Worry: Not on file     Inability: Not on file    Transportation needs:     Medical: Not on file     Non-medical: Not on file   Tobacco Use    Smoking status: Never Smoker    Smokeless tobacco: Never Used   Substance and Sexual Activity    Alcohol use: No    Drug use: No    Sexual activity: Not on file   Lifestyle    Physical activity:     Days per week: Not on file     Minutes per session: Not on file    Stress: Not on file   Relationships    Social connections:      Talks on phone: Not on file     Gets together: Not on file     Attends Protestant service: Not on file     Active member of club or organization: Not on file     Attends meetings of clubs or organizations: Not on file     Relationship status: Not on file   Other Topics Concern    Not on file   Social History Narrative    Not on file       Current Outpatient Medications:     calcitRIOL (ROCALTROL) 0.25 MCG Cap, Take 1 capsule (0.25 mcg total) by mouth once daily., Disp: 30 capsule, Rfl: 3    ergocalciferol (ERGOCALCIFEROL) 50,000 unit Cap, Take 1 capsule (50,000 Units total) by mouth every 7 days., Disp: 12 capsule, Rfl: 3    furosemide (LASIX) 40 MG tablet, Take 1 tablet (40 mg total) by mouth once daily., Disp: 90 tablet, Rfl: 3    labetalol (NORMODYNE) 200 MG tablet, Take 200 mg by mouth every 12 (twelve) hours., Disp: , Rfl: 4    sodium bicarbonate 650 MG tablet, TAKE TWO TABLETS BY MOUTH TWICE DAILY, Disp: 120 tablet, Rfl: 0    flu vacc [id number] 6mos up,PF, 60 mcg (15 mcg x 4)/0.5 mL Syrg, inject as directed (Patient not taking: Reported on 2019), Disp: 0.5 mL, Rfl: 0    furosemide (LASIX) 20 MG tablet, Take 1 tablet (20 mg total) by mouth once daily. (Patient not taking: Reported on 2019), Disp: 30 tablet, Rfl: 3     REVIEW OF SYSTEMS:  General: negative, occassional chills; Respiratory: no cough, shortness of breath, or wheezing; Cardiovascular: no chest pain or dyspnea on exertion; Gastrointestinal: some abdominal pain/muscle spasms, no change in bowel habits, or bloody stools; Genito-Urinary: no dysuria, trouble voiding, or hematuria; Musculoskeletal: no weakness or decreased range of motion, Neurological: no TIA or stroke symptoms; Psychiatric: no nervousness, anxiety or depression.    PHYSICAL EXAM:   Right Arm BP - Sittin/84 (19 1515)  Left Arm BP - Sittin/87 (19 1515)  Pulse: 90  Temp: 98.4 °F (36.9 °C)    General appearance: Alert, well-appearing, and in no  distress.  Oriented to person, place, and time  Neurological: Normal speech, no focal findings noted; CN II - XII grossly intact  Musculoskeletal:Digits/nail without cyanosis/clubbing.  Normal muscle strength/tone.  Neck: Supple, no significant adenopathy, no carotid bruit can be auscultated  Chest:Clear to auscultation, no wheezes, rales or rhonchi, symmetric air entry, No use of accessory muscles   Cardiac:Normal rate and regular rhythm, S1 and S2 normal  Abdomen:Soft, nontender, nondistended, no masses or organomegaly, No rebound tenderness noted; bowel sounds normal, Pulsatile aortic mass is not palpable. No groin adenopathy   Extremities: 2+ femoral pulses bilaterally, 2+ pedal pulses palpable.1+ pre-tibial edema. No ulcerations    LAB RESULTS:  Lab Results   Component Value Date    K 4.9 12/09/2019    K 5.1 11/15/2019    CREATININE 8.7 (H) 12/09/2019    CREATININE 9.3 (H) 11/15/2019     Lab Results   Component Value Date    WBC 7.37 11/15/2019    HCT 36.7 (L) 12/09/2019    HCT 35.6 (L) 11/15/2019     11/15/2019     Lab Results   Component Value Date    HGBA1C 5.2 11/15/2019     IMAGING:  Vein mapping - adequate left cephalic and basilic vein for AVF creation    IMP/PLAN:  49 y.o. male with CKD stage V in need of permanent HD access.  Right handed.  Adequate left cephalic and basilic vein on duplex for AVF creation.  Plan for lyle vs brachiocephalic AVF in the coming weeks.  We discussed the procedure in detail including risks and benefits and he wishes to proceed.     1) plan for LUE AVF creation in the coming weeks.     Derrek Grant MD  Vascular & Endovascular Surgery

## 2019-12-19 ENCOUNTER — TELEPHONE (OUTPATIENT)
Dept: TRANSPLANT | Facility: CLINIC | Age: 49
End: 2019-12-19

## 2019-12-23 ENCOUNTER — HOSPITAL ENCOUNTER (OUTPATIENT)
Dept: CARDIOLOGY | Facility: CLINIC | Age: 49
Discharge: HOME | End: 2019-12-23
Attending: SURGERY
Payer: MEDICAID

## 2019-12-23 DIAGNOSIS — Z01.818 PREOP EXAMINATION: ICD-10-CM

## 2019-12-23 PROCEDURE — 93010 ELECTROCARDIOGRAM REPORT: CPT | Mod: S$PBB,NTX,, | Performed by: INTERNAL MEDICINE

## 2019-12-23 PROCEDURE — 93010 EKG 12-LEAD: ICD-10-PCS | Mod: S$PBB,NTX,, | Performed by: INTERNAL MEDICINE

## 2019-12-23 PROCEDURE — 93005 ELECTROCARDIOGRAM TRACING: CPT | Mod: PBBFAC,NTX | Performed by: INTERNAL MEDICINE

## 2019-12-24 ENCOUNTER — TELEPHONE (OUTPATIENT)
Dept: NEPHROLOGY | Facility: CLINIC | Age: 49
End: 2019-12-24

## 2019-12-24 DIAGNOSIS — N18.5 STAGE 5 CHRONIC KIDNEY DISEASE: Primary | ICD-10-CM

## 2020-01-06 ENCOUNTER — TELEPHONE (OUTPATIENT)
Dept: VASCULAR SURGERY | Facility: CLINIC | Age: 50
End: 2020-01-06

## 2020-01-06 ENCOUNTER — ANESTHESIA EVENT (OUTPATIENT)
Dept: SURGERY | Facility: HOSPITAL | Age: 50
End: 2020-01-06
Payer: MEDICAID

## 2020-01-06 NOTE — PRE-PROCEDURE INSTRUCTIONS
Preop instructions:       For Peripheral Vascular Surgery patients - you may only have sips of water with your AM medications one (1) hour prior to your arrival to the hospital.    If you have received specific instructions from your Surgeon/Surgeon's staff, please follow their instructions.     Showering instructions, directions, leave all valuables at home, medication instructions for PM prior & am of procedure explained. Patient stated an understanding.      Patient denies any side effects or issues with anesthesia or sedation.                                                                                                                                    ARRIVAL TIME TO [facility] - 0830   PT'S WIFE - KAMLA BURGER WILL BE PROVIDING TRANSPORTATION HOME UPON DISCHARGE.

## 2020-01-07 ENCOUNTER — HOSPITAL ENCOUNTER (OUTPATIENT)
Facility: HOSPITAL | Age: 50
Discharge: HOME | End: 2020-01-07
Attending: SURGERY | Admitting: SURGERY
Payer: MEDICAID

## 2020-01-07 ENCOUNTER — ANESTHESIA (OUTPATIENT)
Dept: SURGERY | Facility: HOSPITAL | Age: 50
End: 2020-01-07
Payer: MEDICAID

## 2020-01-07 VITALS
WEIGHT: 260 LBS | HEIGHT: 71 IN | RESPIRATION RATE: 15 BRPM | DIASTOLIC BLOOD PRESSURE: 85 MMHG | BODY MASS INDEX: 36.4 KG/M2 | SYSTOLIC BLOOD PRESSURE: 145 MMHG | HEART RATE: 69 BPM | OXYGEN SATURATION: 100 % | TEMPERATURE: 98 F

## 2020-01-07 DIAGNOSIS — N18.9 CHRONIC KIDNEY DISEASE (CKD): Primary | ICD-10-CM

## 2020-01-07 DIAGNOSIS — N18.6 END STAGE RENAL FAILURE ON DIALYSIS: Primary | ICD-10-CM

## 2020-01-07 DIAGNOSIS — Z99.2 END STAGE RENAL FAILURE ON DIALYSIS: Primary | ICD-10-CM

## 2020-01-07 PROCEDURE — 63600175 PHARM REV CODE 636 W HCPCS: Mod: TXP | Performed by: STUDENT IN AN ORGANIZED HEALTH CARE EDUCATION/TRAINING PROGRAM

## 2020-01-07 PROCEDURE — D9220A PRA ANESTHESIA: Mod: CRNA,NTX,, | Performed by: NURSE ANESTHETIST, CERTIFIED REGISTERED

## 2020-01-07 PROCEDURE — 25000003 PHARM REV CODE 250: Mod: NTX | Performed by: NURSE ANESTHETIST, CERTIFIED REGISTERED

## 2020-01-07 PROCEDURE — 01844 ANES VASC SHUNT/SHUNT REVJ: CPT | Mod: NTX | Performed by: SURGERY

## 2020-01-07 PROCEDURE — 25000003 PHARM REV CODE 250: Mod: TXP | Performed by: SURGERY

## 2020-01-07 PROCEDURE — S0028 INJECTION, FAMOTIDINE, 20 MG: HCPCS | Mod: NTX | Performed by: NURSE ANESTHETIST, CERTIFIED REGISTERED

## 2020-01-07 PROCEDURE — 71000016 HC POSTOP RECOV ADDL HR: Mod: TXP | Performed by: SURGERY

## 2020-01-07 PROCEDURE — 37000008 HC ANESTHESIA 1ST 15 MINUTES: Mod: TXP | Performed by: SURGERY

## 2020-01-07 PROCEDURE — 36000707: Mod: TXP | Performed by: SURGERY

## 2020-01-07 PROCEDURE — D9220A PRA ANESTHESIA: ICD-10-PCS | Mod: ANES,NTX,, | Performed by: ANESTHESIOLOGY

## 2020-01-07 PROCEDURE — 71000044 HC DOSC ROUTINE RECOVERY FIRST HOUR: Mod: NTX | Performed by: SURGERY

## 2020-01-07 PROCEDURE — 36821 PR ANASTOMOSIS,AV,ANY SITE: ICD-10-PCS | Mod: NTX,,, | Performed by: SURGERY

## 2020-01-07 PROCEDURE — D9220A PRA ANESTHESIA: ICD-10-PCS | Mod: CRNA,NTX,, | Performed by: NURSE ANESTHETIST, CERTIFIED REGISTERED

## 2020-01-07 PROCEDURE — 37000009 HC ANESTHESIA EA ADD 15 MINS: Mod: NTX | Performed by: SURGERY

## 2020-01-07 PROCEDURE — 71000015 HC POSTOP RECOV 1ST HR: Mod: TXP | Performed by: SURGERY

## 2020-01-07 PROCEDURE — 36821 AV FUSION DIRECT ANY SITE: CPT | Mod: NTX,,, | Performed by: SURGERY

## 2020-01-07 PROCEDURE — 63600175 PHARM REV CODE 636 W HCPCS: Mod: NTX | Performed by: STUDENT IN AN ORGANIZED HEALTH CARE EDUCATION/TRAINING PROGRAM

## 2020-01-07 PROCEDURE — 36000706: Mod: NTX | Performed by: SURGERY

## 2020-01-07 PROCEDURE — D9220A PRA ANESTHESIA: Mod: ANES,NTX,, | Performed by: ANESTHESIOLOGY

## 2020-01-07 PROCEDURE — 63600175 PHARM REV CODE 636 W HCPCS: Mod: NTX | Performed by: NURSE ANESTHETIST, CERTIFIED REGISTERED

## 2020-01-07 PROCEDURE — 63600175 PHARM REV CODE 636 W HCPCS: Mod: TXP | Performed by: NURSE ANESTHETIST, CERTIFIED REGISTERED

## 2020-01-07 RX ORDER — LIDOCAINE HYDROCHLORIDE 10 MG/ML
INJECTION INFILTRATION; PERINEURAL
Status: DISCONTINUED | OUTPATIENT
Start: 2020-01-07 | End: 2020-01-07 | Stop reason: HOSPADM

## 2020-01-07 RX ORDER — SODIUM CHLORIDE 9 MG/ML
INJECTION, SOLUTION INTRAVENOUS CONTINUOUS PRN
Status: DISCONTINUED | OUTPATIENT
Start: 2020-01-07 | End: 2020-01-07

## 2020-01-07 RX ORDER — SODIUM CHLORIDE 9 MG/ML
INJECTION, SOLUTION INTRAVENOUS CONTINUOUS
Status: DISCONTINUED | OUTPATIENT
Start: 2020-01-07 | End: 2020-10-26

## 2020-01-07 RX ORDER — FENTANYL CITRATE 50 UG/ML
25 INJECTION, SOLUTION INTRAMUSCULAR; INTRAVENOUS EVERY 5 MIN PRN
Status: DISCONTINUED | OUTPATIENT
Start: 2020-01-07 | End: 2020-01-07 | Stop reason: HOSPADM

## 2020-01-07 RX ORDER — LIDOCAINE HYDROCHLORIDE 10 MG/ML
1 INJECTION, SOLUTION EPIDURAL; INFILTRATION; INTRACAUDAL; PERINEURAL ONCE
Status: ACTIVE | OUTPATIENT
Start: 2020-01-07

## 2020-01-07 RX ORDER — FAMOTIDINE 10 MG/ML
INJECTION INTRAVENOUS
Status: DISCONTINUED | OUTPATIENT
Start: 2020-01-07 | End: 2020-01-07

## 2020-01-07 RX ORDER — MIDAZOLAM HYDROCHLORIDE 1 MG/ML
0.5 INJECTION INTRAMUSCULAR; INTRAVENOUS
Status: DISCONTINUED | OUTPATIENT
Start: 2020-01-07 | End: 2020-01-07 | Stop reason: HOSPADM

## 2020-01-07 RX ORDER — LIDOCAINE HCL/PF 100 MG/5ML
SYRINGE (ML) INTRAVENOUS
Status: DISCONTINUED | OUTPATIENT
Start: 2020-01-07 | End: 2020-01-07

## 2020-01-07 RX ORDER — ONDANSETRON 2 MG/ML
4 INJECTION INTRAMUSCULAR; INTRAVENOUS EVERY 12 HOURS PRN
Status: ACTIVE | OUTPATIENT
Start: 2020-01-07

## 2020-01-07 RX ORDER — HEPARIN SODIUM 1000 [USP'U]/ML
INJECTION, SOLUTION INTRAVENOUS; SUBCUTANEOUS
Status: DISCONTINUED | OUTPATIENT
Start: 2020-01-07 | End: 2020-01-07

## 2020-01-07 RX ORDER — PROPOFOL 10 MG/ML
VIAL (ML) INTRAVENOUS CONTINUOUS PRN
Status: DISCONTINUED | OUTPATIENT
Start: 2020-01-07 | End: 2020-01-07

## 2020-01-07 RX ORDER — PHENYLEPHRINE HYDROCHLORIDE 10 MG/ML
INJECTION INTRAVENOUS
Status: DISCONTINUED | OUTPATIENT
Start: 2020-01-07 | End: 2020-01-07

## 2020-01-07 RX ORDER — PROPOFOL 10 MG/ML
VIAL (ML) INTRAVENOUS
Status: DISCONTINUED | OUTPATIENT
Start: 2020-01-07 | End: 2020-01-07

## 2020-01-07 RX ORDER — SODIUM CHLORIDE 0.9 % (FLUSH) 0.9 %
3 SYRINGE (ML) INJECTION
Status: DISCONTINUED | OUTPATIENT
Start: 2020-01-07 | End: 2020-01-07 | Stop reason: HOSPADM

## 2020-01-07 RX ORDER — LORAZEPAM 2 MG/ML
0.25 INJECTION INTRAMUSCULAR ONCE AS NEEDED
Status: DISCONTINUED | OUTPATIENT
Start: 2020-01-07 | End: 2020-01-07 | Stop reason: HOSPADM

## 2020-01-07 RX ORDER — ONDANSETRON 2 MG/ML
INJECTION INTRAMUSCULAR; INTRAVENOUS
Status: DISCONTINUED | OUTPATIENT
Start: 2020-01-07 | End: 2020-01-07

## 2020-01-07 RX ORDER — MIDAZOLAM HYDROCHLORIDE 1 MG/ML
INJECTION, SOLUTION INTRAMUSCULAR; INTRAVENOUS
Status: DISCONTINUED | OUTPATIENT
Start: 2020-01-07 | End: 2020-01-07

## 2020-01-07 RX ORDER — OXYCODONE AND ACETAMINOPHEN 5; 325 MG/1; MG/1
1 TABLET ORAL EVERY 6 HOURS PRN
Qty: 20 TABLET | Refills: 0 | Status: ON HOLD | OUTPATIENT
Start: 2020-01-07 | End: 2020-03-02 | Stop reason: SDUPTHER

## 2020-01-07 RX ORDER — METOCLOPRAMIDE HYDROCHLORIDE 5 MG/ML
10 INJECTION INTRAMUSCULAR; INTRAVENOUS EVERY 10 MIN PRN
Status: DISCONTINUED | OUTPATIENT
Start: 2020-01-07 | End: 2020-01-07 | Stop reason: HOSPADM

## 2020-01-07 RX ORDER — SODIUM CHLORIDE 9 MG/ML
INJECTION, SOLUTION INTRAVENOUS CONTINUOUS
Status: DISCONTINUED | OUTPATIENT
Start: 2020-01-07 | End: 2020-01-07 | Stop reason: HOSPADM

## 2020-01-07 RX ORDER — CEFAZOLIN SODIUM 1 G/3ML
2 INJECTION, POWDER, FOR SOLUTION INTRAMUSCULAR; INTRAVENOUS
Status: COMPLETED | OUTPATIENT
Start: 2020-01-07 | End: 2020-01-07

## 2020-01-07 RX ORDER — PROTAMINE SULFATE 10 MG/ML
INJECTION, SOLUTION INTRAVENOUS
Status: DISCONTINUED | OUTPATIENT
Start: 2020-01-07 | End: 2020-01-07

## 2020-01-07 RX ORDER — HYDROMORPHONE HYDROCHLORIDE 1 MG/ML
0.2 INJECTION, SOLUTION INTRAMUSCULAR; INTRAVENOUS; SUBCUTANEOUS EVERY 5 MIN PRN
Status: DISCONTINUED | OUTPATIENT
Start: 2020-01-07 | End: 2020-01-07 | Stop reason: HOSPADM

## 2020-01-07 RX ORDER — HYDROCODONE BITARTRATE AND ACETAMINOPHEN 5; 325 MG/1; MG/1
1 TABLET ORAL EVERY 4 HOURS PRN
Status: DISCONTINUED | OUTPATIENT
Start: 2020-01-07 | End: 2020-01-07 | Stop reason: HOSPADM

## 2020-01-07 RX ADMIN — MEPIVACAINE HYDROCHLORIDE 20 ML: 15 INJECTION, SOLUTION EPIDURAL; INFILTRATION at 11:01

## 2020-01-07 RX ADMIN — HEPARIN SODIUM 7000 UNITS: 1000 INJECTION, SOLUTION INTRAVENOUS; SUBCUTANEOUS at 12:01

## 2020-01-07 RX ADMIN — ONDANSETRON 4 MG: 2 INJECTION INTRAMUSCULAR; INTRAVENOUS at 11:01

## 2020-01-07 RX ADMIN — LIDOCAINE HYDROCHLORIDE 50 MG: 20 INJECTION, SOLUTION INTRAVENOUS at 11:01

## 2020-01-07 RX ADMIN — HEPARIN SODIUM 1000 UNITS: 1000 INJECTION, SOLUTION INTRAVENOUS; SUBCUTANEOUS at 12:01

## 2020-01-07 RX ADMIN — FENTANYL CITRATE 50 MCG: 50 INJECTION, SOLUTION INTRAMUSCULAR; INTRAVENOUS at 11:01

## 2020-01-07 RX ADMIN — PROPOFOL 40 MG: 10 INJECTION, EMULSION INTRAVENOUS at 11:01

## 2020-01-07 RX ADMIN — CEFAZOLIN 2 G: 330 INJECTION, POWDER, FOR SOLUTION INTRAMUSCULAR; INTRAVENOUS at 12:01

## 2020-01-07 RX ADMIN — PHENYLEPHRINE HYDROCHLORIDE 200 MCG: 10 INJECTION INTRAVENOUS at 01:01

## 2020-01-07 RX ADMIN — MIDAZOLAM HYDROCHLORIDE 2 MG: 1 INJECTION, SOLUTION INTRAMUSCULAR; INTRAVENOUS at 11:01

## 2020-01-07 RX ADMIN — PROPOFOL 75 MCG/KG/MIN: 10 INJECTION, EMULSION INTRAVENOUS at 11:01

## 2020-01-07 RX ADMIN — FAMOTIDINE 20 MG: 10 INJECTION, SOLUTION INTRAVENOUS at 11:01

## 2020-01-07 RX ADMIN — PHENYLEPHRINE HYDROCHLORIDE 100 MCG: 10 INJECTION INTRAVENOUS at 12:01

## 2020-01-07 RX ADMIN — PROTAMINE SULFATE 20 MG: 10 INJECTION, SOLUTION INTRAVENOUS at 01:01

## 2020-01-07 RX ADMIN — SODIUM CHLORIDE: 0.9 INJECTION, SOLUTION INTRAVENOUS at 11:01

## 2020-01-07 NOTE — ANESTHESIA PREPROCEDURE EVALUATION
01/07/2020  Misael Owens is a 50 y.o., male.    Anesthesia Evaluation    I have reviewed the Patient Summary Reports.    I have reviewed the Nursing Notes.   I have reviewed the Medications.     Review of Systems  Anesthesia Hx:  No problems with previous Anesthesia  History of prior surgery of interest to airway management or planning: Previous anesthesia: General  Denies Personal Hx of Anesthesia complications.   Cardiovascular:   Hypertension    Pulmonary:  Pulmonary Normal    Renal/:   Chronic Renal Disease, ESRD    Hepatic/GI:  Hepatic/GI Normal    Neurological:  Neurology Normal    Endocrine:  Endocrine Normal      Patient Active Problem List   Diagnosis    Vitamin D deficiency    CKD stage 5 secondary to hypertension    Secondary hyperparathyroidism    Essential hypertension    Preop examination    Chronic kidney disease (CKD)     Past Medical History:   Diagnosis Date    CKD (chronic kidney disease), stage IV     due to hypertension     Past Surgical History:   Procedure Laterality Date    RENAL BIOPSY           Physical Exam  General:  Well nourished    Airway/Jaw/Neck:  Airway Findings: Mouth Opening: Normal Tongue: Normal  General Airway Assessment: Adult  Mallampati: II  TM Distance: Normal, at least 6 cm  Jaw/Neck Findings:  Neck ROM: Normal ROM      Dental:  Dental Findings: In tact   Chest/Lungs:  Chest/Lungs Findings: Clear to auscultation     Heart/Vascular:  Heart Findings: Rate: Normal  Rhythm: Regular Rhythm  Sounds: Normal        Mental Status:  Mental Status Findings:  Cooperative, Alert and Oriented         Anesthesia Plan  Type of Anesthesia, risks & benefits discussed:  Anesthesia Type:  general  Patient's Preference:   Intra-op Monitoring Plan: standard ASA monitors  Intra-op Monitoring Plan Comments:   Post Op Pain Control Plan: per primary service following discharge from  PACU  Post Op Pain Control Plan Comments:   Induction:   IV  Beta Blocker:  Patient is not currently on a Beta-Blocker (No further documentation required).       Informed Consent: Patient understands risks and agrees with Anesthesia plan.  Questions answered. Anesthesia consent signed with patient.  ASA Score: 4     Day of Surgery Review of History & Physical:    H&P update referred to the surgeon.         Ready For Surgery From Anesthesia Perspective.

## 2020-01-07 NOTE — INTERVAL H&P NOTE
The patient has been examined and the H&P has been reviewed:    I concur with the findings and no changes have occurred since H&P was written.    Anesthesia/Surgery risks, benefits and alternative options discussed and understood by patient/family.          Active Hospital Problems    Diagnosis  POA    Chronic kidney disease (CKD) [N18.9]  Yes      Resolved Hospital Problems   No resolved problems to display.

## 2020-01-07 NOTE — BRIEF OP NOTE
[facility]  Brief Operative Note    Surgery Date: 1/7/2020     Surgeon(s) and Role:     * Derrek Grant MD - Primary     * Zeferino Nelson MD - Resident - Assisting        Pre-op Diagnosis:  CKD (chronic kidney disease) stage 5, GFR less than 15 ml/min [N18.5]    Post-op Diagnosis:  Post-Op Diagnosis Codes:     * CKD (chronic kidney disease) stage 5, GFR less than 15 ml/min [N18.5]    Procedure(s) (LRB):  CREATION, AV FISTULA, brachiobasillic (Left)    Anesthesia: Regional    Description of the findings of the procedure(s): Patient underwent LUE brachiobasilic AVF     Estimated Blood Loss: Minimal         Specimens:   Specimen (12h ago, onward)    None            Discharge Note    OUTCOME: Patient tolerated treatment/procedure well without complication and is now ready for discharge.    DISPOSITION: Home or Self Care    FINAL DIAGNOSIS:  Chronic kidney disease (CKD)    FOLLOWUP: In clinic

## 2020-01-07 NOTE — ANESTHESIA PROCEDURE NOTES
Left suprascapular and ICB single shot in OR     Patient location during procedure: OR    Reason for block: primary anesthetic   Diagnosis: left arm pain    Start time: 1/7/2020 11:20 AM  Timeout: 1/7/2020 11:15 AM   End time: 1/7/2020 11:36 AM    Staffing  Authorizing Provider: Allie Zhang CRNA  Performing Provider: Terry Klein MD    Preanesthetic Checklist  Completed: patient identified, site marked, surgical consent, pre-op evaluation, timeout performed, IV checked, risks and benefits discussed and monitors and equipment checked  Peripheral Block  Patient position: supine  Prep: ChloraPrep  Patient monitoring: heart rate, cardiac monitor, continuous pulse ox, continuous capnometry and frequent blood pressure checks  Block type: supraclavicular  Laterality: left  Injection technique: single shot  Needle  Needle type: Stimuplex   Needle gauge: 22 G  Needle length: 2 in  Needle localization: anatomical landmarks and ultrasound guidance   -ultrasound image captured on disc.  Assessment  Injection assessment: negative aspiration, negative parasthesia and local visualized surrounding nerve  Paresthesia pain: none  Heart rate change: no  Slow fractionated injection: yes  Additional Notes  Intercostal brachial field block performed with mepivacaine 1.5% 10ml for additional coverage.    VSS.  See anesthesia record.  Patient tolerated procedure well.

## 2020-01-07 NOTE — TRANSFER OF CARE
"Anesthesia Transfer of Care Note    Patient: Misael Owens    Procedure(s) Performed: Procedure(s) (LRB):  CREATION, AV FISTULA, brachiobasillic (Left)    Patient location: [facility]    Anesthesia Type: regional    Transport from OR: Transported from OR on 6-10 L/min O2 by face mask with adequate spontaneous ventilation    Post pain: adequate analgesia    Post assessment: no apparent anesthetic complications and tolerated procedure well    Post vital signs: stable    Level of consciousness: responds to stimulation and sedated    Nausea/Vomiting: no nausea/vomiting    Complications: none    Transfer of care protocol was followed      Last vitals:   Visit Vitals  BP (!) 163/66 (BP Location: Right arm, Patient Position: Lying)   Pulse (P) 64   Temp 36.5 °C (97.7 °F) (Oral)   Resp (P) 18   Ht 5' 11" (1.803 m)   Wt 117.9 kg (260 lb)   SpO2 96%   BMI 36.26 kg/m²     "

## 2020-01-07 NOTE — DISCHARGE INSTRUCTIONS
Caring for Your Hemodialysis Access  A problem such as an infection or a blood clot may make the access unusable. Typically, this happens more often with an arteriovenous graft than with an arteriovenous fistula. If this happens, youll need a new access. To help your access last, you will need to follow certain guidelines.  Watching for problems  Call your healthcare provider right away if you:  · Cant feel the blood flowing in the access (this sensation is called a thrill)  · Have pain or numbness in your hand or arm  · Have bleeding, redness, bluish discoloration, or warmth around your access  · Notice your access suddenly bulging out more than usual (a slight bulge is normal)  · Have a fever of 100.4°F (38°C) or higher, or as directed by your healthcare provider   Follow these and any other guidelines youre given  · Dont wear tight clothes or jewelry around your access.  · Dont let anyone take your blood pressure on or draw blood from the arm with the access. Also, dont let anyone put IV lines into it.  · Protect your access from being hit or cut.  · Wash your hands often and keep the area around the access clean.  · Do not carry anything heavy or do anything that would put pressure on the access.  Feeling for your thrill    If you put your fingers over your access, you should feel the blood rushing through it. This is called a thrill, and it feels like a vibration. Feel for the thrill as often as you're told, usually once or twice a day. If you can't feel it, tell your healthcare provider right away. Blood may not be flowing through your access the way it should.  Important numbers  Write the names and numbers of your healthcare providers below. That way you will know how to get in touch with them.  Doctor:  Name ___________________ Phone ___________________  Surgeon:  Name ___________________ Phone ___________________  Dialysis Center:  Name ___________________ Phone ___________________   Date Last  Reviewed: 1/1/2017 © 2000-2017 [employer]. [address] [place] [address], [place] [address]. All rights reserved. This information is not intended as a substitute for professional medical care. Always follow your healthcare professional's instructions.        Anesthesia: General Anesthesia     You are watched continuously during your procedure by your anesthesia provider.     Youre due to have surgery. During surgery, youll be given medicine called anesthesia or anesthetic. This will keep you comfortable and pain-free. Your anesthesia provider will use general anesthesia.  What is general anesthesia?  General anesthesia puts you into a state like deep sleep. It goes into the bloodstream (IV anesthetics), into the lungs (gas anesthetics), or both. You feel nothing during the procedure. You will not remember it. During the procedure, the anesthesia provider monitors you continuously. He or she checks your heart rate and rhythm, blood pressure, breathing, and blood oxygen.  · IV anesthetics. IV anesthetics are given through an IV line in your arm. Theyre often given first. This is so you are asleep before a gas anesthetic is started. Some kinds of IV anesthetics relieve pain. Others relax you. Your doctor will decide which kind is best in your case.  · Gas anesthetics. Gas anesthetics are breathed into the lungs. They are often used to keep you asleep. They can be given through a facemask or a tube placed in your larynx or trachea (breathing tube).  ¨ If you have a facemask, your anesthesia provider will most likely place it over your nose and mouth while youre still awake. Youll breathe oxygen through the mask as your IV anesthetic is started. Gas anesthetic may be added through the mask.  ¨ If you have a tube in the larynx or trachea, it will be inserted into your throat after youre asleep.  Anesthesia tools and medicines  You will likely have:  · IV anesthetics. These are put into an IV line into your  bloodstream.  · Gas anesthetics. You breathe these anesthetics into your lungs, where they pass into your bloodstream.  · Pulse oximeter. This is a small clip that is attached to the end of your finger. This measures your blood oxygen level.  · Electrocardiography leads (electrodes). These are small sticky pads that are placed on your chest. They record your heart rate and rhythm.  · Blood pressure cuff. This reads your blood pressure.  Risks and possible complications  General anesthesia has some risks. These include:  · Breathing problems  · Nausea and vomiting  · Sore throat or hoarseness (usually temporary)  · Allergic reaction to the anesthetic  · Irregular heartbeat (rare)  · Cardiac arrest (rare)   Anesthesia safety  · Follow all instructions you are given for how long not to eat or drink before your procedure.  · Be sure your doctor knows what medicines and drugs you take. This includes over-the-counter medicines, herbs, supplements, alcohol or other drugs. You will be asked when those were last taken.  · Have an adult family member or friend drive you home after the procedure.  · For the first 24 hours after your surgery:  ¨ Do not drive or use heavy equipment.  ¨ Do not make important decisions or sign legal documents. If important decisions or signing legal documents is necessary during the first 24 hours after surgery, have a trusted family member or spouse act on your behalf.  ¨ Avoid alcohol.  ¨ Have a responsible adult stay with you. He or she can watch for problems and help keep you safe.  Date Last Reviewed: 12/1/2016  © 7898-3510 [employer]. [address] [place] [address], [place] [address]. All rights reserved. This information is not intended as a substitute for professional medical care. Always follow your healthcare professional's instructions.

## 2020-01-07 NOTE — ANESTHESIA POSTPROCEDURE EVALUATION
Anesthesia Post Evaluation    Patient: Misael Owens    Procedure(s) Performed: Procedure(s) (LRB):  CREATION, AV FISTULA, brachiobasillic (Left)    Final Anesthesia Type: regional    Patient location during evaluation: PACU  Patient participation: Yes- Able to Participate  Level of consciousness: awake and alert and oriented  Post-procedure vital signs: reviewed and stable  Pain management: adequate  Airway patency: patent    PONV status at discharge: No PONV  Anesthetic complications: no      Cardiovascular status: hemodynamically stable  Respiratory status: unassisted, spontaneous ventilation and room air  Hydration status: euvolemic  Follow-up not needed.          Vitals Value Taken Time   /51 1/7/2020  2:09 PM   Temp 36.5 °C (97.7 °F) 1/7/2020  1:37 PM   Pulse 74 1/7/2020  2:17 PM   Resp 43 1/7/2020  2:17 PM   SpO2 100 % 1/7/2020  2:17 PM   Vitals shown include unvalidated device data.      No case tracking events are documented in the log.      Pain/Kian Score: No data recorded

## 2020-01-07 NOTE — PLAN OF CARE
Patient denies pain and nausea.  Tolerates clear liquids well.  VSS.  Discharge instructions given with verbal understanding received.  Anesthesia and procedure consents in chart.  Splint to upper left extremity in place.

## 2020-01-08 ENCOUNTER — PATIENT MESSAGE (OUTPATIENT)
Dept: ADMINISTRATIVE | Facility: OTHER | Age: 50
End: 2020-01-08

## 2020-01-08 NOTE — OP NOTE
1/7/2020      Indications: Misael Owens is a 50 y.o. male  With end stage renal disease and need for long-term dialysis access.    Pre-operative Diagnosis:  Stage V CKD in need of AV access.    Post-operative Diagnosis: same.    Operation: Creation of LUE Brachiobasilic AVF    Surgeon: Derrek Grant MD    Assistants: Zeferino Nelson MD    Anesthesia: IV regional    Findings: Basilic proved an adequate vein for AVF. Cephalic vein was not suitable for AVF creation.    EBL::  20mL           Specimens: None           Complications:  None; patient tolerated the procedure well.           Disposition: PACU - hemodynamically stable.           Condition: stable    Attending Attestation: I was present and scrubbed for the entire procedure.    Procedure Details:  The patient was seen in the Holding Room. The risks, benefits, complications, treatment options, and expected outcomes were discussed with the patient. The patient concurred with the proposed plan, giving informed consent.  The site of surgery properly noted/marked. A Time Out was held and the above information confirmed.    The patient was brought to the Operating Room. The arm prepped and draped in a sterile fashion. A longitudinal incision was made just proximal to the LUE antecubital crease. The brachial artery  was identified. It was dissected proximally and distally and vessel loops were placed around it. The basilic vein was then dissected and proximal and distal control were obtained. A clamp was placed proximally and the vein ligated with 3-0 silk and divided distally. After flushing the vessels with heparin, the vessel loops were clamped and a end-to-side anastomosis between the basilic vein and the brachial artery was created using 6-0 Prolene sutures in a continuous running manner. After completion of the anastomosis, the vessel loops were released. Hemostasis was secured. Good thrill was noted in the fistula and the incision was then closed in three  layers, deep tissue with 2-0 vicryl, subcutaneous tissue with 3-0 Vicryl and skin with 4-0 Monocryl. Telfa, gauze, and tegaderm was applied.

## 2020-01-28 DIAGNOSIS — Z76.82 ORGAN TRANSPLANT CANDIDATE: Primary | ICD-10-CM

## 2020-01-30 ENCOUNTER — PATIENT OUTREACH (OUTPATIENT)
Dept: ADMINISTRATIVE | Facility: HOSPITAL | Age: 50
End: 2020-01-30

## 2020-01-30 ENCOUNTER — HOSPITAL ENCOUNTER (OUTPATIENT)
Dept: VASCULAR SURGERY | Facility: CLINIC | Age: 50
Discharge: HOME | End: 2020-01-30
Attending: SURGERY
Payer: MEDICAID

## 2020-01-30 ENCOUNTER — OFFICE VISIT (OUTPATIENT)
Dept: VASCULAR SURGERY | Facility: CLINIC | Age: 50
End: 2020-01-30
Attending: SURGERY
Payer: MEDICAID

## 2020-01-30 VITALS
HEIGHT: 71 IN | HEART RATE: 78 BPM | WEIGHT: 249.13 LBS | DIASTOLIC BLOOD PRESSURE: 63 MMHG | BODY MASS INDEX: 34.88 KG/M2 | TEMPERATURE: 99 F | SYSTOLIC BLOOD PRESSURE: 112 MMHG

## 2020-01-30 DIAGNOSIS — N18.6 END STAGE RENAL FAILURE ON DIALYSIS: ICD-10-CM

## 2020-01-30 DIAGNOSIS — Z99.2 END STAGE RENAL FAILURE ON DIALYSIS: Primary | ICD-10-CM

## 2020-01-30 DIAGNOSIS — N18.6 END STAGE RENAL FAILURE ON DIALYSIS: Primary | ICD-10-CM

## 2020-01-30 DIAGNOSIS — T82.858A AV FISTULA STENOSIS, INITIAL ENCOUNTER: ICD-10-CM

## 2020-01-30 DIAGNOSIS — Z99.2 END STAGE RENAL FAILURE ON DIALYSIS: ICD-10-CM

## 2020-01-30 PROCEDURE — 93990 PR DUPLEX HEMODIALYSIS ACCESS: ICD-10-PCS | Mod: 26,S$PBB,TXP, | Performed by: SURGERY

## 2020-01-30 PROCEDURE — 93990 DOPPLER FLOW TESTING: CPT | Mod: PBBFAC,NTX | Performed by: SURGERY

## 2020-01-30 PROCEDURE — 99999 PR PBB SHADOW E&M-EST. PATIENT-LVL IV: ICD-10-PCS | Mod: PBBFAC,TXP,, | Performed by: SURGERY

## 2020-01-30 PROCEDURE — 99999 PR PBB SHADOW E&M-EST. PATIENT-LVL IV: CPT | Mod: PBBFAC,TXP,, | Performed by: SURGERY

## 2020-01-30 PROCEDURE — 99214 PR OFFICE/OUTPT VISIT, EST, LEVL IV, 30-39 MIN: ICD-10-PCS | Mod: 24,S$PBB,NTX, | Performed by: SURGERY

## 2020-01-30 PROCEDURE — 99214 OFFICE O/P EST MOD 30 MIN: CPT | Mod: 24,S$PBB,NTX, | Performed by: SURGERY

## 2020-01-30 PROCEDURE — 93990 DOPPLER FLOW TESTING: CPT | Mod: 26,S$PBB,TXP, | Performed by: SURGERY

## 2020-01-30 PROCEDURE — 99214 OFFICE O/P EST MOD 30 MIN: CPT | Mod: PBBFAC,25,TXP | Performed by: SURGERY

## 2020-01-30 RX ORDER — LIDOCAINE HYDROCHLORIDE 10 MG/ML
1 INJECTION, SOLUTION EPIDURAL; INFILTRATION; INTRACAUDAL; PERINEURAL ONCE
Status: CANCELLED | OUTPATIENT
Start: 2020-01-30 | End: 2020-01-30

## 2020-01-30 RX ORDER — SODIUM CHLORIDE 9 MG/ML
INJECTION, SOLUTION INTRAVENOUS CONTINUOUS
Status: CANCELLED | OUTPATIENT
Start: 2020-01-30

## 2020-01-30 NOTE — H&P (VIEW-ONLY)
Misael Owens  01/30/2020    HPI:  Mr Owens is a 50 y.o. male with a h/o HTN and CKD 5, now s/p brachiobasilic AVF creation 1/7/2020.  He is doing very well, save for a small incisional hematoma.     Previously    He was first diagnosed with CKD in 2015 and has been following nephrology at [place] then to [place] and now his new nephrologist is Dr. Moreno at [facility].  The patient states that he is right handed.  No major surgeries to the hands or arms.  No radiation to the chest or arms.  Never had a central line placed in either internal jugular.  He is still producing urine urinating 6-7x a day.  His last GFR on 12/9 was 7.4 placing him at CKD stage V.  He denies any shortness of breath or chest pain.  He does have leg swelling which results in cramps.    No MI/stroke  Tobacco use: Never    Past Medical History:   Diagnosis Date    CKD (chronic kidney disease), stage IV     due to hypertension     Past Surgical History:   Procedure Laterality Date    AV FISTULA PLACEMENT Left 1/7/2020    Procedure: CREATION, AV FISTULA, brachiobasillic;  Surgeon: Derrek Grant MD;  Location: [facility] OR [address] [place] [address];  Service: Peripheral Vascular;  Laterality: Left;    RENAL BIOPSY       Family History   Problem Relation Age of Onset    Hypertension Mother     Diabetes Mother     Lung cancer Maternal Uncle     Heart disease Other     Diabetes Other     Stroke Neg Hx      Social History     Socioeconomic History    Marital status: Single     Spouse name: Not on file    Number of children: Not on file    Years of education: Not on file    Highest education level: Not on file   Occupational History    Not on file   Social Needs    Financial resource strain: Not on file    Food insecurity:     Worry: Not on file     Inability: Not on file    Transportation needs:     Medical: Not on file     Non-medical: Not on file   Tobacco Use    Smoking status: Never Smoker    Smokeless tobacco: Never Used   Substance and  Sexual Activity    Alcohol use: No    Drug use: No    Sexual activity: Not on file   Lifestyle    Physical activity:     Days per week: Not on file     Minutes per session: Not on file    Stress: Not on file   Relationships    Social connections:     Talks on phone: Not on file     Gets together: Not on file     Attends Yazdanism service: Not on file     Active member of club or organization: Not on file     Attends meetings of clubs or organizations: Not on file     Relationship status: Not on file   Other Topics Concern    Not on file   Social History Narrative    Not on file       Current Outpatient Medications:     calcitRIOL (ROCALTROL) 0.25 MCG Cap, Take 1 capsule (0.25 mcg total) by mouth once daily., Disp: 30 capsule, Rfl: 3    ergocalciferol (ERGOCALCIFEROL) 50,000 unit Cap, Take 1 capsule (50,000 Units total) by mouth every 7 days., Disp: 12 capsule, Rfl: 3    flu vacc [id number] 6mos up,PF, 60 mcg (15 mcg x 4)/0.5 mL Syrg, inject as directed, Disp: 0.5 mL, Rfl: 0    furosemide (LASIX) 40 MG tablet, Take 1 tablet (40 mg total) by mouth once daily., Disp: 90 tablet, Rfl: 3    labetalol (NORMODYNE) 200 MG tablet, Take 200 mg by mouth every 12 (twelve) hours., Disp: , Rfl: 4    oxyCODONE-acetaminophen (PERCOCET) 5-325 mg per tablet, Take 1 tablet by mouth every 6 (six) hours as needed for Pain., Disp: 20 tablet, Rfl: 0    sodium bicarbonate 650 MG tablet, TAKE TWO TABLETS BY MOUTH TWICE DAILY, Disp: 120 tablet, Rfl: 0  No current facility-administered medications for this visit.     Facility-Administered Medications Ordered in Other Visits:     0.9%  NaCl infusion, , Intravenous, Continuous, Jay Sherman MD    lidocaine (PF) 10 mg/ml (1%) injection 10 mg, 1 mL, Intradermal, Once, Jay Sherman MD    ondansetron injection 4 mg, 4 mg, Intravenous, Q12H PRN, Jay Sherman MD     REVIEW OF SYSTEMS:  General: negative, occassional chills; Respiratory: no cough, shortness of breath, or  wheezing; Cardiovascular: no chest pain or dyspnea on exertion; Gastrointestinal: some abdominal pain/muscle spasms, no change in bowel habits, or bloody stools; Genito-Urinary: no dysuria, trouble voiding, or hematuria; Musculoskeletal: no weakness or decreased range of motion, Neurological: no TIA or stroke symptoms; Psychiatric: no nervousness, anxiety or depression.    PHYSICAL EXAM:   Right Arm BP - Sittin/63 (20 0947)  Pulse: 78  Temp: 98.8 °F (37.1 °C)    General appearance: Alert, well-appearing, and in no distress.  Oriented to person, place, and time  Neurological: Normal speech, no focal findings noted; CN II - XII grossly intact  Musculoskeletal:Digits/nail without cyanosis/clubbing.  Normal muscle strength/tone.  Neck: Supple, no significant adenopathy, no carotid bruit can be auscultated  Chest:Clear to auscultation, no wheezes, rales or rhonchi, symmetric air entry, No use of accessory muscles   Cardiac:Normal rate and regular rhythm, S1 and S2 normal  Abdomen:Soft, nontender, nondistended, no masses or organomegaly, No rebound tenderness noted; bowel sounds normal, Pulsatile aortic mass is not palpable. No groin adenopathy   Extremities: 2+ femoral pulses bilaterally, 2+ pedal pulses palpable.1+ pre-tibial edema. No ulcerations  Excellent thrill overlying basilic vein  Small (3cm x 3cm) incisional hematoma    LAB RESULTS:  Lab Results   Component Value Date    K 5.0 2020    K 5.7 (H) 2019    K 4.9 2019    CREATININE 8.8 (H) 2020    CREATININE 8.8 (H) 2019    CREATININE 8.7 (H) 2019     Lab Results   Component Value Date    WBC 6.36 2019    WBC 7.37 11/15/2019    HCT 38.2 (L) 2019    HCT 36.7 (L) 2019    HCT 35.6 (L) 11/15/2019     2019     11/15/2019     Lab Results   Component Value Date    HGBA1C 5.2 11/15/2019     IMAGING:  Dialysis Access  =============  Location: Left Arm.  Type of AV access: Brachiobasilic  AVF.  LEFT        Depth                Diam AP                PS                  Flow Volume                 mm                    mm                       cm/s               ml/min  Inflo                                                               [address]  [address]  Anas                                                              531  jennifer  sis  Dista                                                              424  l to  anastomosis  (A)  Fistu        9.0                    8.5                         177  la  prox  Fistu        9.6                    8.0                         127                 1,598  la  mid  Fistu        13.8                   9.9                        163  la  distal  Outfl                                                              92  ow V  prox       Impression  =========  Color flow duplex reveals a patent left Brachiobasilic AV fistula. There are accelerated velocities noted at the anastomosis and just distal to the anastomosis. The vein  appears tortuous at that site. The volume flow at the mid bicep measures 1598mL/min.    IMP/PLAN:  50 y.o. male with CKD stage V now s/p LUE Brachiobasilic AV fistula.  Doing quite well.  Duplex reviewed. Flow volume and vein diameter are excellent - he is maturing quickly.  Will benefit from fistulagram prior to basilic vein transposition.  Risks and benefits discussed in detail, he wishes to proceed.    1) plan for LUE fistulagram next week, with basilic vein transposition the following week    Derrek Grant MD  Vascular & Endovascular Surgery

## 2020-01-30 NOTE — PROGRESS NOTES
Misael Owens  01/30/2020    HPI:  Mr Owens is a 50 y.o. male with a h/o HTN and CKD 5, now s/p brachiobasilic AVF creation 1/7/2020.  He is doing very well, save for a small incisional hematoma.     Previously    He was first diagnosed with CKD in 2015 and has been following nephrology at [place] then to [place] and now his new nephrologist is Dr. Moreno at [facility].  The patient states that he is right handed.  No major surgeries to the hands or arms.  No radiation to the chest or arms.  Never had a central line placed in either internal jugular.  He is still producing urine urinating 6-7x a day.  His last GFR on 12/9 was 7.4 placing him at CKD stage V.  He denies any shortness of breath or chest pain.  He does have leg swelling which results in cramps.    No MI/stroke  Tobacco use: Never    Past Medical History:   Diagnosis Date    CKD (chronic kidney disease), stage IV     due to hypertension     Past Surgical History:   Procedure Laterality Date    AV FISTULA PLACEMENT Left 1/7/2020    Procedure: CREATION, AV FISTULA, brachiobasillic;  Surgeon: Derrek Grant MD;  Location: [facility] OR [address] [place] [address];  Service: Peripheral Vascular;  Laterality: Left;    RENAL BIOPSY       Family History   Problem Relation Age of Onset    Hypertension Mother     Diabetes Mother     Lung cancer Maternal Uncle     Heart disease Other     Diabetes Other     Stroke Neg Hx      Social History     Socioeconomic History    Marital status: Single     Spouse name: Not on file    Number of children: Not on file    Years of education: Not on file    Highest education level: Not on file   Occupational History    Not on file   Social Needs    Financial resource strain: Not on file    Food insecurity:     Worry: Not on file     Inability: Not on file    Transportation needs:     Medical: Not on file     Non-medical: Not on file   Tobacco Use    Smoking status: Never Smoker    Smokeless tobacco: Never Used   Substance and  Sexual Activity    Alcohol use: No    Drug use: No    Sexual activity: Not on file   Lifestyle    Physical activity:     Days per week: Not on file     Minutes per session: Not on file    Stress: Not on file   Relationships    Social connections:     Talks on phone: Not on file     Gets together: Not on file     Attends Baptism service: Not on file     Active member of club or organization: Not on file     Attends meetings of clubs or organizations: Not on file     Relationship status: Not on file   Other Topics Concern    Not on file   Social History Narrative    Not on file       Current Outpatient Medications:     calcitRIOL (ROCALTROL) 0.25 MCG Cap, Take 1 capsule (0.25 mcg total) by mouth once daily., Disp: 30 capsule, Rfl: 3    ergocalciferol (ERGOCALCIFEROL) 50,000 unit Cap, Take 1 capsule (50,000 Units total) by mouth every 7 days., Disp: 12 capsule, Rfl: 3    flu vacc [id number] 6mos up,PF, 60 mcg (15 mcg x 4)/0.5 mL Syrg, inject as directed, Disp: 0.5 mL, Rfl: 0    furosemide (LASIX) 40 MG tablet, Take 1 tablet (40 mg total) by mouth once daily., Disp: 90 tablet, Rfl: 3    labetalol (NORMODYNE) 200 MG tablet, Take 200 mg by mouth every 12 (twelve) hours., Disp: , Rfl: 4    oxyCODONE-acetaminophen (PERCOCET) 5-325 mg per tablet, Take 1 tablet by mouth every 6 (six) hours as needed for Pain., Disp: 20 tablet, Rfl: 0    sodium bicarbonate 650 MG tablet, TAKE TWO TABLETS BY MOUTH TWICE DAILY, Disp: 120 tablet, Rfl: 0  No current facility-administered medications for this visit.     Facility-Administered Medications Ordered in Other Visits:     0.9%  NaCl infusion, , Intravenous, Continuous, Jay Sherman MD    lidocaine (PF) 10 mg/ml (1%) injection 10 mg, 1 mL, Intradermal, Once, Jay Sherman MD    ondansetron injection 4 mg, 4 mg, Intravenous, Q12H PRN, Jay Sherman MD     REVIEW OF SYSTEMS:  General: negative, occassional chills; Respiratory: no cough, shortness of breath, or  wheezing; Cardiovascular: no chest pain or dyspnea on exertion; Gastrointestinal: some abdominal pain/muscle spasms, no change in bowel habits, or bloody stools; Genito-Urinary: no dysuria, trouble voiding, or hematuria; Musculoskeletal: no weakness or decreased range of motion, Neurological: no TIA or stroke symptoms; Psychiatric: no nervousness, anxiety or depression.    PHYSICAL EXAM:   Right Arm BP - Sittin/63 (20 0947)  Pulse: 78  Temp: 98.8 °F (37.1 °C)    General appearance: Alert, well-appearing, and in no distress.  Oriented to person, place, and time  Neurological: Normal speech, no focal findings noted; CN II - XII grossly intact  Musculoskeletal:Digits/nail without cyanosis/clubbing.  Normal muscle strength/tone.  Neck: Supple, no significant adenopathy, no carotid bruit can be auscultated  Chest:Clear to auscultation, no wheezes, rales or rhonchi, symmetric air entry, No use of accessory muscles   Cardiac:Normal rate and regular rhythm, S1 and S2 normal  Abdomen:Soft, nontender, nondistended, no masses or organomegaly, No rebound tenderness noted; bowel sounds normal, Pulsatile aortic mass is not palpable. No groin adenopathy   Extremities: 2+ femoral pulses bilaterally, 2+ pedal pulses palpable.1+ pre-tibial edema. No ulcerations  Excellent thrill overlying basilic vein  Small (3cm x 3cm) incisional hematoma    LAB RESULTS:  Lab Results   Component Value Date    K 5.0 2020    K 5.7 (H) 2019    K 4.9 2019    CREATININE 8.8 (H) 2020    CREATININE 8.8 (H) 2019    CREATININE 8.7 (H) 2019     Lab Results   Component Value Date    WBC 6.36 2019    WBC 7.37 11/15/2019    HCT 38.2 (L) 2019    HCT 36.7 (L) 2019    HCT 35.6 (L) 11/15/2019     2019     11/15/2019     Lab Results   Component Value Date    HGBA1C 5.2 11/15/2019     IMAGING:  Dialysis Access  =============  Location: Left Arm.  Type of AV access: Brachiobasilic  AVF.  LEFT        Depth                Diam AP                PS                  Flow Volume                 mm                    mm                       cm/s               ml/min  Inflo                                                               89  w A  Anas                                                              531  jennifer  sis  Dista                                                              424  l to  anastomosis  (A)  Fistu        9.0                    8.5                         177  la  prox  Fistu        9.6                    8.0                         127                 1,598  la  mid  Fistu        13.8                   9.9                        163  la  distal  Outfl                                                              92  ow V  prox       Impression  =========  Color flow duplex reveals a patent left Brachiobasilic AV fistula. There are accelerated velocities noted at the anastomosis and just distal to the anastomosis. The vein  appears tortuous at that site. The volume flow at the mid bicep measures 1598mL/min.    IMP/PLAN:  50 y.o. male with CKD stage V now s/p LUE Brachiobasilic AV fistula.  Doing quite well.  Duplex reviewed. Flow volume and vein diameter are excellent - he is maturing quickly.  Will benefit from fistulagram prior to basilic vein transposition.  Risks and benefits discussed in detail, he wishes to proceed.    1) plan for LUE fistulagram next week, with basilic vein transposition the following week    Derrek Grant MD  Vascular & Endovascular Surgery

## 2020-01-31 ENCOUNTER — DOCUMENTATION ONLY (OUTPATIENT)
Dept: VASCULAR SURGERY | Facility: CLINIC | Age: 50
End: 2020-01-31

## 2020-02-03 ENCOUNTER — TELEPHONE (OUTPATIENT)
Dept: VASCULAR SURGERY | Facility: CLINIC | Age: 50
End: 2020-02-03

## 2020-02-04 ENCOUNTER — HOSPITAL ENCOUNTER (OUTPATIENT)
Facility: HOSPITAL | Age: 50
Discharge: HOME | End: 2020-02-04
Attending: SURGERY | Admitting: SURGERY
Payer: MEDICAID

## 2020-02-04 ENCOUNTER — ANESTHESIA EVENT (OUTPATIENT)
Dept: SURGERY | Facility: HOSPITAL | Age: 50
End: 2020-02-04
Payer: MEDICAID

## 2020-02-04 ENCOUNTER — ANESTHESIA (OUTPATIENT)
Dept: SURGERY | Facility: HOSPITAL | Age: 50
End: 2020-02-04
Payer: MEDICAID

## 2020-02-04 VITALS
DIASTOLIC BLOOD PRESSURE: 81 MMHG | OXYGEN SATURATION: 97 % | SYSTOLIC BLOOD PRESSURE: 120 MMHG | RESPIRATION RATE: 24 BRPM | HEIGHT: 71 IN | BODY MASS INDEX: 33.6 KG/M2 | WEIGHT: 240 LBS | HEART RATE: 70 BPM | TEMPERATURE: 99 F

## 2020-02-04 DIAGNOSIS — N18.5 STAGE 5 CHRONIC KIDNEY DISEASE NOT ON CHRONIC DIALYSIS: Primary | ICD-10-CM

## 2020-02-04 DIAGNOSIS — Z99.2 END STAGE RENAL FAILURE ON DIALYSIS: ICD-10-CM

## 2020-02-04 DIAGNOSIS — N18.6 END STAGE RENAL FAILURE ON DIALYSIS: ICD-10-CM

## 2020-02-04 DIAGNOSIS — Z99.2 END STAGE RENAL FAILURE ON DIALYSIS: Primary | ICD-10-CM

## 2020-02-04 DIAGNOSIS — N18.6 END STAGE RENAL FAILURE ON DIALYSIS: Primary | ICD-10-CM

## 2020-02-04 PROCEDURE — 71000015 HC POSTOP RECOV 1ST HR: Mod: NTX | Performed by: SURGERY

## 2020-02-04 PROCEDURE — 25000003 PHARM REV CODE 250: Mod: NTX | Performed by: SURGERY

## 2020-02-04 PROCEDURE — D9220A PRA ANESTHESIA: ICD-10-PCS | Mod: ANES,NTX,, | Performed by: ANESTHESIOLOGY

## 2020-02-04 PROCEDURE — C1887 CATHETER, GUIDING: HCPCS | Mod: TXP | Performed by: SURGERY

## 2020-02-04 PROCEDURE — 25500020 PHARM REV CODE 255: Mod: NTX | Performed by: SURGERY

## 2020-02-04 PROCEDURE — 36902 PR INTRO CATH, DIALYSIS CIRCUIT W/TRANSLML BALLOON ANGIO: ICD-10-PCS | Mod: 78,TXP,, | Performed by: SURGERY

## 2020-02-04 PROCEDURE — 36000707: Mod: NTX | Performed by: SURGERY

## 2020-02-04 PROCEDURE — 63600175 PHARM REV CODE 636 W HCPCS: Mod: NTX | Performed by: NURSE ANESTHETIST, CERTIFIED REGISTERED

## 2020-02-04 PROCEDURE — D9220A PRA ANESTHESIA: Mod: ANES,NTX,, | Performed by: ANESTHESIOLOGY

## 2020-02-04 PROCEDURE — 36000706: Mod: NTX | Performed by: SURGERY

## 2020-02-04 PROCEDURE — 63600175 PHARM REV CODE 636 W HCPCS: Mod: TXP | Performed by: STUDENT IN AN ORGANIZED HEALTH CARE EDUCATION/TRAINING PROGRAM

## 2020-02-04 PROCEDURE — 01844 ANES VASC SHUNT/SHUNT REVJ: CPT | Mod: NTX | Performed by: SURGERY

## 2020-02-04 PROCEDURE — 27201423 OPTIME MED/SURG SUP & DEVICES STERILE SUPPLY: Mod: NTX | Performed by: SURGERY

## 2020-02-04 PROCEDURE — C1769 GUIDE WIRE: HCPCS | Mod: TXP | Performed by: SURGERY

## 2020-02-04 PROCEDURE — 37000009 HC ANESTHESIA EA ADD 15 MINS: Mod: NTX | Performed by: SURGERY

## 2020-02-04 PROCEDURE — C1894 INTRO/SHEATH, NON-LASER: HCPCS | Mod: TXP | Performed by: SURGERY

## 2020-02-04 PROCEDURE — 71000016 HC POSTOP RECOV ADDL HR: Mod: TXP | Performed by: SURGERY

## 2020-02-04 PROCEDURE — 63600175 PHARM REV CODE 636 W HCPCS: Mod: NTX | Performed by: STUDENT IN AN ORGANIZED HEALTH CARE EDUCATION/TRAINING PROGRAM

## 2020-02-04 PROCEDURE — 36902 INTRO CATH DIALYSIS CIRCUIT: CPT | Mod: 78,TXP,, | Performed by: SURGERY

## 2020-02-04 PROCEDURE — 37000008 HC ANESTHESIA 1ST 15 MINUTES: Mod: TXP | Performed by: SURGERY

## 2020-02-04 PROCEDURE — C1725 CATH, TRANSLUMIN NON-LASER: HCPCS | Mod: TXP | Performed by: SURGERY

## 2020-02-04 PROCEDURE — D9220A PRA ANESTHESIA: ICD-10-PCS | Mod: CRNA,NTX,, | Performed by: NURSE ANESTHETIST, CERTIFIED REGISTERED

## 2020-02-04 PROCEDURE — D9220A PRA ANESTHESIA: Mod: CRNA,NTX,, | Performed by: NURSE ANESTHETIST, CERTIFIED REGISTERED

## 2020-02-04 PROCEDURE — 63600175 PHARM REV CODE 636 W HCPCS: Mod: NTX | Performed by: SURGERY

## 2020-02-04 PROCEDURE — 71000044 HC DOSC ROUTINE RECOVERY FIRST HOUR: Mod: NTX | Performed by: SURGERY

## 2020-02-04 RX ORDER — CEFAZOLIN SODIUM 1 G/3ML
2 INJECTION, POWDER, FOR SOLUTION INTRAMUSCULAR; INTRAVENOUS
Status: COMPLETED | OUTPATIENT
Start: 2020-02-04 | End: 2020-02-04

## 2020-02-04 RX ORDER — HEPARIN SODIUM 1000 [USP'U]/ML
INJECTION, SOLUTION INTRAVENOUS; SUBCUTANEOUS
Status: DISCONTINUED | OUTPATIENT
Start: 2020-02-04 | End: 2020-02-04 | Stop reason: HOSPADM

## 2020-02-04 RX ORDER — SODIUM CHLORIDE 0.9 % (FLUSH) 0.9 %
3 SYRINGE (ML) INJECTION
Status: DISCONTINUED | OUTPATIENT
Start: 2020-02-04 | End: 2020-02-04 | Stop reason: HOSPADM

## 2020-02-04 RX ORDER — LIDOCAINE HYDROCHLORIDE 10 MG/ML
1 INJECTION, SOLUTION EPIDURAL; INFILTRATION; INTRACAUDAL; PERINEURAL ONCE
Status: DISCONTINUED | OUTPATIENT
Start: 2020-02-04 | End: 2020-02-04 | Stop reason: HOSPADM

## 2020-02-04 RX ORDER — HEPARIN SODIUM 1000 [USP'U]/ML
INJECTION, SOLUTION INTRAVENOUS; SUBCUTANEOUS
Status: DISCONTINUED | OUTPATIENT
Start: 2020-02-04 | End: 2020-02-04

## 2020-02-04 RX ORDER — HYDROCODONE BITARTRATE AND ACETAMINOPHEN 5; 325 MG/1; MG/1
1 TABLET ORAL EVERY 4 HOURS PRN
Status: DISCONTINUED | OUTPATIENT
Start: 2020-02-04 | End: 2020-02-04 | Stop reason: HOSPADM

## 2020-02-04 RX ORDER — ACETAMINOPHEN 500 MG
1000 TABLET ORAL EVERY 6 HOURS PRN
Status: DISCONTINUED | OUTPATIENT
Start: 2020-02-04 | End: 2020-02-04 | Stop reason: HOSPADM

## 2020-02-04 RX ORDER — SODIUM CHLORIDE 9 MG/ML
INJECTION, SOLUTION INTRAVENOUS CONTINUOUS
Status: DISCONTINUED | OUTPATIENT
Start: 2020-02-04 | End: 2020-02-04 | Stop reason: HOSPADM

## 2020-02-04 RX ORDER — MIDAZOLAM HYDROCHLORIDE 1 MG/ML
INJECTION, SOLUTION INTRAMUSCULAR; INTRAVENOUS
Status: DISCONTINUED | OUTPATIENT
Start: 2020-02-04 | End: 2020-02-04

## 2020-02-04 RX ORDER — HYDROMORPHONE HYDROCHLORIDE 1 MG/ML
0.2 INJECTION, SOLUTION INTRAMUSCULAR; INTRAVENOUS; SUBCUTANEOUS EVERY 5 MIN PRN
Status: DISCONTINUED | OUTPATIENT
Start: 2020-02-04 | End: 2020-02-04 | Stop reason: HOSPADM

## 2020-02-04 RX ORDER — MEPERIDINE HYDROCHLORIDE 50 MG/ML
12.5 INJECTION INTRAMUSCULAR; INTRAVENOUS; SUBCUTANEOUS ONCE AS NEEDED
Status: DISCONTINUED | OUTPATIENT
Start: 2020-02-04 | End: 2020-02-04 | Stop reason: HOSPADM

## 2020-02-04 RX ORDER — IODIXANOL 320 MG/ML
INJECTION, SOLUTION INTRAVASCULAR
Status: DISCONTINUED | OUTPATIENT
Start: 2020-02-04 | End: 2020-02-04 | Stop reason: HOSPADM

## 2020-02-04 RX ORDER — ONDANSETRON 2 MG/ML
4 INJECTION INTRAMUSCULAR; INTRAVENOUS DAILY PRN
Status: DISCONTINUED | OUTPATIENT
Start: 2020-02-04 | End: 2020-02-04 | Stop reason: HOSPADM

## 2020-02-04 RX ORDER — VERAPAMIL HYDROCHLORIDE 2.5 MG/ML
INJECTION, SOLUTION INTRAVENOUS
Status: DISCONTINUED | OUTPATIENT
Start: 2020-02-04 | End: 2020-02-04 | Stop reason: HOSPADM

## 2020-02-04 RX ORDER — LIDOCAINE HYDROCHLORIDE 10 MG/ML
INJECTION, SOLUTION EPIDURAL; INFILTRATION; INTRACAUDAL; PERINEURAL
Status: DISCONTINUED | OUTPATIENT
Start: 2020-02-04 | End: 2020-02-04 | Stop reason: HOSPADM

## 2020-02-04 RX ORDER — FENTANYL CITRATE 50 UG/ML
INJECTION, SOLUTION INTRAMUSCULAR; INTRAVENOUS
Status: DISCONTINUED | OUTPATIENT
Start: 2020-02-04 | End: 2020-02-04

## 2020-02-04 RX ORDER — NITROGLYCERIN 5 MG/ML
INJECTION, SOLUTION INTRAVENOUS
Status: DISCONTINUED | OUTPATIENT
Start: 2020-02-04 | End: 2020-02-04 | Stop reason: HOSPADM

## 2020-02-04 RX ADMIN — FENTANYL CITRATE 25 MCG: 50 INJECTION, SOLUTION INTRAMUSCULAR; INTRAVENOUS at 10:02

## 2020-02-04 RX ADMIN — FENTANYL CITRATE 25 MCG: 50 INJECTION, SOLUTION INTRAMUSCULAR; INTRAVENOUS at 09:02

## 2020-02-04 RX ADMIN — CEFAZOLIN 2 G: 330 INJECTION, POWDER, FOR SOLUTION INTRAMUSCULAR; INTRAVENOUS at 09:02

## 2020-02-04 RX ADMIN — MIDAZOLAM HYDROCHLORIDE 2 MG: 1 INJECTION, SOLUTION INTRAMUSCULAR; INTRAVENOUS at 09:02

## 2020-02-04 RX ADMIN — HEPARIN SODIUM 5000 UNITS: 1000 INJECTION, SOLUTION INTRAVENOUS; SUBCUTANEOUS at 09:02

## 2020-02-04 RX ADMIN — SODIUM CHLORIDE: 0.9 INJECTION, SOLUTION INTRAVENOUS at 09:02

## 2020-02-04 NOTE — TRANSFER OF CARE
"Anesthesia Transfer of Care Note    Patient: Misael Owens    Procedure(s) Performed: Procedure(s) (LRB):  Fistulogram (Left)    Patient location: PACU    Anesthesia Type: general    Transport from OR: Transported from OR on room air with adequate spontaneous ventilation    Post pain: adequate analgesia    Post assessment: no apparent anesthetic complications    Post vital signs: stable    Level of consciousness: awake    Nausea/Vomiting: no nausea/vomiting    Complications: none    Transfer of care protocol was followed      Last vitals:   Visit Vitals  /77 (BP Location: Right arm, Patient Position: Lying)   Pulse 76   Temp 36.9 °C (98.4 °F) (Oral)   Resp 18   Ht 5' 11" (1.803 m)   Wt 108.9 kg (240 lb)   SpO2 98%   BMI 33.47 kg/m²     "

## 2020-02-04 NOTE — PLAN OF CARE
Discharge instructions given and explained to patient and family with verbalization of understanding all instructions. . Patients v/s stable, denies n/v and tolerating po, rates pain level tolerable, IV removed, and family at bedside for patient discharge home.

## 2020-02-04 NOTE — BRIEF OP NOTE
[facility]  Brief Operative Note    Surgery Date: 2/4/2020     Surgeon(s) and Role:     * Derrek Grant MD - Primary    Assisting Surgeon: Sherri Casper MD    Pre-op Diagnosis:  End stage renal failure on dialysis [N18.6, Z99.2]    Post-op Diagnosis:  Post-Op Diagnosis Codes:     * End stage renal failure on dialysis [N18.6, Z99.2]    Procedure:  1. LUE fistulogram  2. US guided transradial access  3. PTA proximal fistula at anastomosis (6x40 dorado)    Anesthesia: Local MAC    Description of the findings of the procedure(s): 70% stenosis proximal AVF stenosis, <30% residual. Strong thrill    Estimated Blood Loss: 5 cc         Specimens:   Specimen (12h ago, onward)    None            Discharge Note    OUTCOME: Patient tolerated treatment/procedure well without complication and is now ready for discharge.    DISPOSITION: Home or Self Care    FINAL DIAGNOSIS:  <principal problem not specified>    FOLLOWUP: In clinic

## 2020-02-04 NOTE — ANESTHESIA POSTPROCEDURE EVALUATION
Anesthesia Post Evaluation    Patient: Misael Owens    Procedure(s) Performed: Procedure(s) (LRB):  Fistulogram (Left)    Final Anesthesia Type: MAC    Patient location during evaluation: PACU  Patient participation: Yes- Able to Participate  Level of consciousness: awake and alert  Post-procedure vital signs: reviewed and stable  Pain management: adequate  Airway patency: patent    PONV status at discharge: No PONV  Anesthetic complications: no      Cardiovascular status: blood pressure returned to baseline and hemodynamically stable  Respiratory status: unassisted, spontaneous ventilation and room air  Hydration status: euvolemic  Follow-up not needed.          Vitals Value Taken Time   /80 2/4/2020 12:12 PM   Temp 37.3 °C (99.1 °F) 2/4/2020 10:22 AM   Pulse 73 2/4/2020 12:13 PM   Resp 19 2/4/2020 12:13 PM   SpO2 100 % 2/4/2020 12:13 PM   Vitals shown include unvalidated device data.      No case tracking events are documented in the log.      Pain/Kian Score: Kian Score: 10 (2/4/2020 10:22 AM)

## 2020-02-04 NOTE — ANESTHESIA PREPROCEDURE EVALUATION
02/04/2020  Misael Owens is a 50 y.o., male.    Anesthesia Evaluation    I have reviewed the Patient Summary Reports.    I have reviewed the Nursing Notes.   I have reviewed the Medications.     Review of Systems  Anesthesia Hx:  No problems with previous Anesthesia  Denies Family Hx of Anesthesia complications.   Denies Personal Hx of Anesthesia complications.   Cardiovascular:   Exercise tolerance: good  Functional Capacity good / => 4 METS      Patient Active Problem List   Diagnosis    Vitamin D deficiency    CKD stage 5 secondary to hypertension    Secondary hyperparathyroidism    Essential hypertension    Preop examination    Chronic kidney disease (CKD)    End stage renal failure on dialysis         Physical Exam  General:  Well nourished    Airway/Jaw/Neck:  Airway Findings: Mouth Opening: Normal Tongue: Normal  General Airway Assessment: Adult  Mallampati: II  TM Distance: Normal, at least 6 cm  Jaw/Neck Findings:  Neck ROM: Normal ROM      Dental:  Dental Findings: In tact   Chest/Lungs:  Chest/Lungs Findings: Clear to auscultation     Heart/Vascular:  Heart Findings: Rate: Normal  Rhythm: Regular Rhythm  Sounds: Normal        Mental Status:  Mental Status Findings:  Cooperative, Alert and Oriented         Anesthesia Plan  Type of Anesthesia, risks & benefits discussed:  Anesthesia Type:  general, MAC  Patient's Preference: either  Intra-op Monitoring Plan: standard ASA monitors  Intra-op Monitoring Plan Comments:   Post Op Pain Control Plan:   Post Op Pain Control Plan Comments: Per primary service  Induction:   IV  Beta Blocker:  Patient is not currently on a Beta-Blocker (No further documentation required).       Informed Consent: Patient understands risks and agrees with Anesthesia plan.  Questions answered. Anesthesia consent signed with patient.  ASA Score: 3     Day of Surgery Review of  History & Physical:    H&P update referred to the surgeon.         Ready For Surgery From Anesthesia Perspective.

## 2020-02-05 ENCOUNTER — OFFICE VISIT (OUTPATIENT)
Dept: NEPHROLOGY | Facility: CLINIC | Age: 50
End: 2020-02-05
Payer: MEDICAID

## 2020-02-05 ENCOUNTER — TELEPHONE (OUTPATIENT)
Dept: NEPHROLOGY | Facility: CLINIC | Age: 50
End: 2020-02-05

## 2020-02-05 VITALS
OXYGEN SATURATION: 98 % | DIASTOLIC BLOOD PRESSURE: 84 MMHG | WEIGHT: 246.94 LBS | BODY MASS INDEX: 34.44 KG/M2 | HEART RATE: 79 BPM | SYSTOLIC BLOOD PRESSURE: 130 MMHG

## 2020-02-05 DIAGNOSIS — I10 ESSENTIAL HYPERTENSION: ICD-10-CM

## 2020-02-05 DIAGNOSIS — N18.5 CKD STAGE 5 SECONDARY TO HYPERTENSION: Primary | ICD-10-CM

## 2020-02-05 DIAGNOSIS — R80.9 PROTEINURIA, UNSPECIFIED TYPE: ICD-10-CM

## 2020-02-05 DIAGNOSIS — I12.0 CKD STAGE 5 SECONDARY TO HYPERTENSION: Primary | ICD-10-CM

## 2020-02-05 DIAGNOSIS — N25.81 SECONDARY HYPERPARATHYROIDISM: ICD-10-CM

## 2020-02-05 DIAGNOSIS — N18.4 STAGE 4 CHRONIC KIDNEY DISEASE: ICD-10-CM

## 2020-02-05 PROCEDURE — 99215 OFFICE O/P EST HI 40 MIN: CPT | Mod: S$PBB,,, | Performed by: INTERNAL MEDICINE

## 2020-02-05 PROCEDURE — 99999 PR PBB SHADOW E&M-EST. PATIENT-LVL III: CPT | Mod: PBBFAC,,, | Performed by: INTERNAL MEDICINE

## 2020-02-05 PROCEDURE — 99213 OFFICE O/P EST LOW 20 MIN: CPT | Mod: PBBFAC | Performed by: INTERNAL MEDICINE

## 2020-02-05 PROCEDURE — 99999 PR PBB SHADOW E&M-EST. PATIENT-LVL III: ICD-10-PCS | Mod: PBBFAC,,, | Performed by: INTERNAL MEDICINE

## 2020-02-05 PROCEDURE — 99215 PR OFFICE/OUTPT VISIT, EST, LEVL V, 40-54 MIN: ICD-10-PCS | Mod: S$PBB,,, | Performed by: INTERNAL MEDICINE

## 2020-02-05 RX ORDER — CALCITRIOL 0.5 UG/1
0.5 CAPSULE ORAL DAILY
Qty: 30 CAPSULE | Refills: 3 | Status: SHIPPED | OUTPATIENT
Start: 2020-02-05 | End: 2021-01-15 | Stop reason: SDUPTHER

## 2020-02-05 NOTE — PROGRESS NOTES
HISTORY OF PRESENT ILLNESS:  This is a 50-year-old -American male with   longstanding history of hypertension diagnosed in 2015 when he had lower   extremity edema, was evaluated for CKD with nephrotic syndrome is her for f/u from last month.  He has never   been in the [facility] system before but was followed at the [place] several   years ago and went to [place] and since then has lost follow up for the   past two years with a nephrologist.  He was biopsied  in 2015 and   was found to have hypertensive kidney disease per the patient and unable to   retrieve the records, although 2016 note stated diabetic glomerulosclerosis   along with questionable FSGS, but the patient does not have diabetes.  It is   unclear if this is correct documentation.  Her blood pressures are mainly in the   130s/80s per the patient.    His creatinine is elevated at 9+.  It is   not clear what his creatinines were prior to that.    PAST MEDICAL HISTORY:  Significant for hypertension diagnosed in 2015, but   likely has had it for a longer period of time, advanced CKD stage IV, nephrotic   syndrome.    SOCIAL HISTORY:  Does not smoke, does not drink.  He is a ,   lives with a friend currently.    FAMILY HISTORY:  No family history of kidney disease.    REVIEW OF SYSTEMS:  Occ nausea but improved from last time.  He has good   appetite. Lost 10 lbs in the last 3 weeks.     PHYSICAL EXAMINATION:  GENERAL:  Well-nourished, well-developed individual, in no acute distress.  HEENT:  PERRLA, EOMI.  NECK:  No cervical lymphadenopathy.  CARDIOVASCULAR:  Rate and rhythm regular.  No murmurs, gallops or rubs.  LUNGS:  Clear to auscultation bilaterally.  Normal respiratory effort.  ABDOMEN:  Soft, nontender and nondistended.  EXTREMITIES:  No edema.    ASSESSMENT AND PLAN:  This is a 50-year-old -American male with   longstanding history of hypertension and nephrotic syndrome is coming in for f/u  of CKD stage  IV.    1.  CKD stage IV, likely secondary to  hypertensive nephropathy for which he had a  biopsy that was done in 2015.  He has   advanced kidney disease and recently had AV access. Had angioplasty of access yesterday and needs transposition.   He was referred to transplant.  He was educated about his advanced kidney disease and he is aware of uremic symptoms.  He would   like to do home dialysis, but currently does not have a home.   2.  Anemia.   His hemoglobin recently was stable along with iron studies.  3.  Renal osteodystrophy.  His intact PTH is elevated.  He is on calcitriol. Increase to 0.5 mcg a day. Vitamin   D levels are normal.  4.  Hypertension.  Blood pressures are stable currently on labetalol, lasix.  He was educated about   low-potassium, low-phosphorus diet and was given the list.  We will refer him to   see Dr. Simental or Dr. Klein in the [place] so he can be transitioned to   dialysis closer to where he is living currently.      RTC in 6 weeks with labs in 1-2 month.

## 2020-02-05 NOTE — OP NOTE
Date: 02/04/2020    Surgeon(s) and Role:   Derrek Grant MD - Primary   Sherri Casper MD - Assisting     Pre-op Diagnosis:   End stage renal failure on dialysis [N18.6, Z99.2]    Post-op Diagnosis: Same     Procedure(s):   1. LUE fistulogram  2. US guided transradial access  3. PTA proximal fistula at anastomosis (6x40 dorado)    Anesthesia: RN IV sedation    Findings/Key Components:   70% stenosis proximal AVF stenosis, <30% residual. Strong thrill    EBL: Minimal    PROCEDURE IN DETAIL:  The patient was brought to theOR, placed in supine. L arm was prepped and draped in the standard surgical fashion. Under ultrasound guidance, the L radial artery was accessed with a micropuncture needle; ultrasound confirmed vessel patency, followed by placement of 4/3-Telugu micropuncture dilator. Fistulogram was performed and revealed a 70% proximal fistula stenosis at the fistula. The sheath was upsized to a 5/6 slender transradial sheath. Through this, an 0.035-inch wire was used to selected the fistula and passed through the high-grade stenosis. This was treated with a 6x40 dorado balloon (2 victorina 8 min). <30% stenosis was noted. Strong thrill could be felt. The sheath was removed, 3-0 monocryl stitch was placed with good hemostasis.    Dr Grant was present for the procedure    Sherri Casper MD   Fellow, Vascular/Endovascular Surgery

## 2020-02-12 DIAGNOSIS — N18.5 STAGE 5 CHRONIC KIDNEY DISEASE: Primary | ICD-10-CM

## 2020-02-14 ENCOUNTER — TELEPHONE (OUTPATIENT)
Dept: INTERNAL MEDICINE | Facility: CLINIC | Age: 50
End: 2020-02-14

## 2020-02-14 NOTE — TELEPHONE ENCOUNTER
----- Message from Alea Silva sent at 2/14/2020  2:51 PM CST -----  Contact: Pt wife Olive   Pt wife Olive calling in to reschedule husbands 02/17 appointment .   Pt can be reached at either number on file to reschedule     Thanks

## 2020-02-17 ENCOUNTER — TELEPHONE (OUTPATIENT)
Dept: TRANSPLANT | Facility: CLINIC | Age: 50
End: 2020-02-17

## 2020-02-20 ENCOUNTER — HOSPITAL ENCOUNTER (OUTPATIENT)
Dept: VASCULAR SURGERY | Facility: CLINIC | Age: 50
Discharge: HOME | End: 2020-02-20
Attending: SURGERY
Payer: MEDICAID

## 2020-02-20 ENCOUNTER — OFFICE VISIT (OUTPATIENT)
Dept: VASCULAR SURGERY | Facility: CLINIC | Age: 50
End: 2020-02-20
Attending: SURGERY
Payer: MEDICAID

## 2020-02-20 VITALS
DIASTOLIC BLOOD PRESSURE: 96 MMHG | WEIGHT: 252.44 LBS | BODY MASS INDEX: 35.34 KG/M2 | TEMPERATURE: 99 F | HEART RATE: 75 BPM | SYSTOLIC BLOOD PRESSURE: 165 MMHG | HEIGHT: 71 IN

## 2020-02-20 DIAGNOSIS — N18.6 ESRD (END STAGE RENAL DISEASE): Primary | ICD-10-CM

## 2020-02-20 DIAGNOSIS — Z99.2 END STAGE RENAL FAILURE ON DIALYSIS: ICD-10-CM

## 2020-02-20 DIAGNOSIS — N18.5 CKD (CHRONIC KIDNEY DISEASE) STAGE 5, GFR LESS THAN 15 ML/MIN: Primary | ICD-10-CM

## 2020-02-20 DIAGNOSIS — N18.6 END STAGE RENAL FAILURE ON DIALYSIS: Primary | ICD-10-CM

## 2020-02-20 DIAGNOSIS — Z99.2 END STAGE RENAL FAILURE ON DIALYSIS: Primary | ICD-10-CM

## 2020-02-20 DIAGNOSIS — N18.6 END STAGE RENAL FAILURE ON DIALYSIS: ICD-10-CM

## 2020-02-20 PROCEDURE — 93990 PR DUPLEX HEMODIALYSIS ACCESS: ICD-10-PCS | Mod: 26,S$PBB,TXP, | Performed by: SURGERY

## 2020-02-20 PROCEDURE — 93990 DOPPLER FLOW TESTING: CPT | Mod: 26,S$PBB,TXP, | Performed by: SURGERY

## 2020-02-20 PROCEDURE — 93990 DOPPLER FLOW TESTING: CPT | Mod: PBBFAC,TXP | Performed by: SURGERY

## 2020-02-20 PROCEDURE — 99024 POSTOP FOLLOW-UP VISIT: CPT | Mod: S$PBB,NTX,, | Performed by: SURGERY

## 2020-02-20 PROCEDURE — 99213 OFFICE O/P EST LOW 20 MIN: CPT | Mod: PBBFAC,25,TXP | Performed by: SURGERY

## 2020-02-20 PROCEDURE — 99999 PR PBB SHADOW E&M-EST. PATIENT-LVL III: CPT | Mod: PBBFAC,TXP,, | Performed by: SURGERY

## 2020-02-20 PROCEDURE — 99024 PR POST-OP FOLLOW-UP VISIT: ICD-10-PCS | Mod: S$PBB,NTX,, | Performed by: SURGERY

## 2020-02-20 PROCEDURE — 99999 PR PBB SHADOW E&M-EST. PATIENT-LVL III: ICD-10-PCS | Mod: PBBFAC,TXP,, | Performed by: SURGERY

## 2020-02-20 NOTE — PROGRESS NOTES
2/20/2020    HPI:     Patient is a 50 y.o. male with hx of HTN and CKD 5, now s/p left brachiobasilic AVF creation 1/7/2020 who presents for follow up. He states that there was swelling surrounding the fistula site after the operation which is now resolved. He denies any current swelling or skin changes. He has not started dialysis.       Tobacco use: never.      Past Medical History:   Diagnosis Date    CKD (chronic kidney disease), stage IV     due to hypertension     Past Surgical History:   Procedure Laterality Date    AV FISTULA PLACEMENT Left 1/7/2020    Procedure: CREATION, AV FISTULA, brachiobasillic;  Surgeon: Derrek Grant MD;  Location: [facility] OR [address] [place] [address];  Service: Peripheral Vascular;  Laterality: Left;    FISTULOGRAM Left 2/4/2020    Procedure: Fistulogram;  Surgeon: Derrek Grant MD;  Location: [facility] OR [address] [place] [address];  Service: Peripheral Vascular;  Laterality: Left;  Fluro: 2.7 min  mGy: 23.74  contrast : 17ml    RENAL BIOPSY       Family History   Problem Relation Age of Onset    Hypertension Mother     Diabetes Mother     Lung cancer Maternal Uncle     Heart disease Other     Diabetes Other     Stroke Neg Hx        Social History     Socioeconomic History    Marital status: Single     Spouse name: Not on file    Number of children: Not on file    Years of education: Not on file    Highest education level: Not on file   Occupational History    Not on file   Social Needs    Financial resource strain: Not on file    Food insecurity:     Worry: Not on file     Inability: Not on file    Transportation needs:     Medical: Not on file     Non-medical: Not on file   Tobacco Use    Smoking status: Never Smoker    Smokeless tobacco: Never Used   Substance and Sexual Activity    Alcohol use: No    Drug use: No    Sexual activity: Not on file   Lifestyle    Physical activity:     Days per week: Not on file     Minutes per session: Not on file    Stress: Not on file   Relationships     Social connections:     Talks on phone: Not on file     Gets together: Not on file     Attends Rastafari service: Not on file     Active member of club or organization: Not on file     Attends meetings of clubs or organizations: Not on file     Relationship status: Not on file   Other Topics Concern    Not on file   Social History Narrative    Not on file         Current Outpatient Medications   Medication Sig    atorvastatin (LIPITOR) 40 MG tablet Take 1 tablet (40 mg total) by mouth every evening.    lisinopril (PRINIVIL,ZESTRIL) 5 MG tablet Take 2 tablets (10 mg total) by mouth once daily.    metoprolol succinate (TOPROL-XL) 50 MG 24 hr tablet Take 1 tablet (50 mg total) by mouth once daily.    aspirin (ECOTRIN) 81 MG EC tablet Take 1 tablet (81 mg total) by mouth once daily.            Review of Systems   Constitutional: Negative for chills and fever.   HENT: Negative for nosebleeds.    Eyes: Negative for pain.   Respiratory: Negative for cough and shortness of breath.    Cardiovascular: Negative for chest pain and leg swelling.   Gastrointestinal: Negative for nausea and vomiting.   Skin: Negative for rash.   Neurological: Negative for focal weakness.   Endo/Heme/Allergies: Does not bruise/bleed easily.       Physical Exam   Constitutional: He is oriented to person, place, and time and well-developed, well-nourished, and in no distress.   HENT:   Head: Atraumatic.   Eyes: EOM are normal.   Neck: Normal range of motion. Neck supple.   Cardiovascular: Normal rate and regular rhythm.   Pulmonary/Chest: Effort normal.   Abdominal: There is no tenderness.   Musculoskeletal: Normal range of motion. He exhibits no edema.   Neurological: He is alert and oriented to person, place, and time.   Skin: Skin is warm and dry. No rash noted. No erythema.   Fistula with well healed incision on left arm. Strong palpable thrill.    Psychiatric: Affect normal.        Vitals:    02/20/20 1119   BP: (!) 165/96   Pulse: 75    Temp: 98.5 °F (36.9 °C)       Imaging:   Vascular US 2/20/20:   Dialysis Access  =============  Location: Left Arm.  Type of AV access: Brachiobasilic AVF.  LEFT        PS                  Flow Volume                 cm/s                ml/min  Inflo          192  w A  prox  Inflo          437  w A  distal  Anas        603  jennifer  sis  Dista        400  l to  anastomosis  (A)  Fistu        311  la  prox  Fistu        262                 3,822  la  mid  Fistu        166  la  distal  Outfl         260  ow V  prox  Outfl         176  ow V  distal  Basilic confluence- 260 cm/s  Axillary vein- 176 cm/s.       Impression  =========  Color flow duplex reveals a patent Left Brachiobasilic AV fistula. There is a slight visual narrowing and a PSV increase from 192 cm/sec to 437 cm/sec at the level of the  inflow artery. These findings may suggest a focal hemodynamically significant stenosis. Elevated velocities at the anastomosis may be due to vessel tortuosity at this area.  The volume flow at the mid bicep measures 3822 mL/min.    IMP/Plan:   Patient is a 50 y.o. male with hx of HTN and CKD 5, now s/p left brachiobasilic AVF creation 1/7/2020. He denies any problems with the fistula and it appears well healed on examination with strong thrill.  Will proceed with BVT in the coming weeks.  Risks and benefits discussed in detail and he wishes to proceed.     Scheduled for transposition of fistula on 3/2/20.     Derrek Grant MD  Vascular & Endovascular Surgery

## 2020-02-20 NOTE — H&P (VIEW-ONLY)
2/20/2020    HPI:     Patient is a 50 y.o. male with hx of HTN and CKD 5, now s/p left brachiobasilic AVF creation 1/7/2020 who presents for follow up. He states that there was swelling surrounding the fistula site after the operation which is now resolved. He denies any current swelling or skin changes. He has not started dialysis.       Tobacco use: never.      Past Medical History:   Diagnosis Date    CKD (chronic kidney disease), stage IV     due to hypertension     Past Surgical History:   Procedure Laterality Date    AV FISTULA PLACEMENT Left 1/7/2020    Procedure: CREATION, AV FISTULA, brachiobasillic;  Surgeon: Derrek Grant MD;  Location: [facility] OR [address] [place] [address];  Service: Peripheral Vascular;  Laterality: Left;    FISTULOGRAM Left 2/4/2020    Procedure: Fistulogram;  Surgeon: Derrek Grant MD;  Location: [facility] OR [address] [place] [address];  Service: Peripheral Vascular;  Laterality: Left;  Fluro: 2.7 min  mGy: 23.74  contrast : 17ml    RENAL BIOPSY       Family History   Problem Relation Age of Onset    Hypertension Mother     Diabetes Mother     Lung cancer Maternal Uncle     Heart disease Other     Diabetes Other     Stroke Neg Hx        Social History     Socioeconomic History    Marital status: Single     Spouse name: Not on file    Number of children: Not on file    Years of education: Not on file    Highest education level: Not on file   Occupational History    Not on file   Social Needs    Financial resource strain: Not on file    Food insecurity:     Worry: Not on file     Inability: Not on file    Transportation needs:     Medical: Not on file     Non-medical: Not on file   Tobacco Use    Smoking status: Never Smoker    Smokeless tobacco: Never Used   Substance and Sexual Activity    Alcohol use: No    Drug use: No    Sexual activity: Not on file   Lifestyle    Physical activity:     Days per week: Not on file     Minutes per session: Not on file    Stress: Not on file   Relationships     Social connections:     Talks on phone: Not on file     Gets together: Not on file     Attends Mormonism service: Not on file     Active member of club or organization: Not on file     Attends meetings of clubs or organizations: Not on file     Relationship status: Not on file   Other Topics Concern    Not on file   Social History Narrative    Not on file         Current Outpatient Medications   Medication Sig    atorvastatin (LIPITOR) 40 MG tablet Take 1 tablet (40 mg total) by mouth every evening.    lisinopril (PRINIVIL,ZESTRIL) 5 MG tablet Take 2 tablets (10 mg total) by mouth once daily.    metoprolol succinate (TOPROL-XL) 50 MG 24 hr tablet Take 1 tablet (50 mg total) by mouth once daily.    aspirin (ECOTRIN) 81 MG EC tablet Take 1 tablet (81 mg total) by mouth once daily.            Review of Systems   Constitutional: Negative for chills and fever.   HENT: Negative for nosebleeds.    Eyes: Negative for pain.   Respiratory: Negative for cough and shortness of breath.    Cardiovascular: Negative for chest pain and leg swelling.   Gastrointestinal: Negative for nausea and vomiting.   Skin: Negative for rash.   Neurological: Negative for focal weakness.   Endo/Heme/Allergies: Does not bruise/bleed easily.       Physical Exam   Constitutional: He is oriented to person, place, and time and well-developed, well-nourished, and in no distress.   HENT:   Head: Atraumatic.   Eyes: EOM are normal.   Neck: Normal range of motion. Neck supple.   Cardiovascular: Normal rate and regular rhythm.   Pulmonary/Chest: Effort normal.   Abdominal: There is no tenderness.   Musculoskeletal: Normal range of motion. He exhibits no edema.   Neurological: He is alert and oriented to person, place, and time.   Skin: Skin is warm and dry. No rash noted. No erythema.   Fistula with well healed incision on left arm. Strong palpable thrill.    Psychiatric: Affect normal.        Vitals:    02/20/20 1119   BP: (!) 165/96   Pulse: 75    Temp: 98.5 °F (36.9 °C)       Imaging:   Vascular US 2/20/20:   Dialysis Access  =============  Location: Left Arm.  Type of AV access: Brachiobasilic AVF.  LEFT        PS                  Flow Volume                 cm/s                ml/min  Inflo          192  w A  prox  Inflo          437  w A  distal  Anas        603  jennifer  sis  Dista        400  l to  anastomosis  (A)  Fistu        311  la  prox  Fistu        262                 3,822  la  mid  Fistu        166  la  distal  Outfl         260  ow V  prox  Outfl         176  ow V  distal  Basilic confluence- 260 cm/s  Axillary vein- 176 cm/s.       Impression  =========  Color flow duplex reveals a patent Left Brachiobasilic AV fistula. There is a slight visual narrowing and a PSV increase from 192 cm/sec to 437 cm/sec at the level of the  inflow artery. These findings may suggest a focal hemodynamically significant stenosis. Elevated velocities at the anastomosis may be due to vessel tortuosity at this area.  The volume flow at the mid bicep measures 3822 mL/min.    IMP/Plan:   Patient is a 50 y.o. male with hx of HTN and CKD 5, now s/p left brachiobasilic AVF creation 1/7/2020. He denies any problems with the fistula and it appears well healed on examination with strong thrill.  Will proceed with BVT in the coming weeks.  Risks and benefits discussed in detail and he wishes to proceed.     Scheduled for transposition of fistula on 3/2/20.     Derrek Grant MD  Vascular & Endovascular Surgery

## 2020-02-28 ENCOUNTER — TELEPHONE (OUTPATIENT)
Dept: VASCULAR SURGERY | Facility: CLINIC | Age: 50
End: 2020-02-28

## 2020-02-28 ENCOUNTER — ANESTHESIA EVENT (OUTPATIENT)
Dept: SURGERY | Facility: HOSPITAL | Age: 50
End: 2020-02-28
Payer: MEDICAID

## 2020-02-28 NOTE — TELEPHONE ENCOUNTER
Spoke to friend of pt's that stated she will be his ride the day of surgery. Verified location, NPO, and scrub and notified of arrival time at 0800. Verbalized understanding.

## 2020-02-28 NOTE — ANESTHESIA PREPROCEDURE EVALUATION
[facility]  Anesthesia Pre-Operative Evaluation         Patient Name: Misael Owens  YOB: 1970  MRN: [id number]    SUBJECTIVE:     Pre-operative evaluation for Procedure(s) (LRB):  CREATION, AV FISTULA (Left)     02/28/2020    Misael Owens is a 50 y.o. male w/ a significant PMHx of HTN and CKD IV 2/2 hypertensive nephropathy s/p AV fistula (1/7/20) who presents for transposition.    Patient now presents for the above procedure(s).      LDA:        Hemodialysis AV Fistula 01/07/20 Left upper arm (Active)   Site Assessment Clean;Dry;Intact 1/7/2020  3:47 PM   Patency Thrill;Bruit;Present 2/4/2020  1:01 PM   Status Deaccessed 1/7/2020  3:47 PM   Number of days: 52       Prev airway: None documented.    Drips: None documented.      Patient Active Problem List   Diagnosis    Vitamin D deficiency    CKD stage 5 secondary to hypertension    Secondary hyperparathyroidism    Essential hypertension    Preop examination    Chronic kidney disease (CKD)    End stage renal failure on dialysis       Review of patient's allergies indicates:   Allergen Reactions    Lisinopril Swelling       Current Inpatient Medications:      Current Facility-Administered Medications on File Prior to Encounter   Medication Dose Route Frequency Provider Last Rate Last Dose    0.9%  NaCl infusion   Intravenous Continuous Jay Sherman MD        lidocaine (PF) 10 mg/ml (1%) injection 10 mg  1 mL Intradermal Once Jay Sherman MD        ondansetron injection 4 mg  4 mg Intravenous Q12H PRN Jay Sherman MD         Current Outpatient Medications on File Prior to Encounter   Medication Sig Dispense Refill    calcitRIOL (ROCALTROL) 0.5 MCG Cap Take 1 capsule (0.5 mcg total) by mouth once daily. 30 capsule 3    ergocalciferol (ERGOCALCIFEROL) 50,000 unit Cap Take 1 capsule (50,000 Units total) by mouth every 7 days. 12 capsule 3    flu vacc [id number] 6mos up,PF, 60 mcg (15 mcg x 4)/0.5 mL Syrg inject as  directed 0.5 mL 0    furosemide (LASIX) 40 MG tablet Take 1 tablet (40 mg total) by mouth once daily. 90 tablet 3    labetalol (NORMODYNE) 200 MG tablet Take 200 mg by mouth every 12 (twelve) hours.  4    oxyCODONE-acetaminophen (PERCOCET) 5-325 mg per tablet Take 1 tablet by mouth every 6 (six) hours as needed for Pain. 20 tablet 0    sodium bicarbonate 650 MG tablet TAKE TWO TABLETS BY MOUTH TWICE DAILY 120 tablet 0       Past Surgical History:   Procedure Laterality Date    AV FISTULA PLACEMENT Left 1/7/2020    Procedure: CREATION, AV FISTULA, brachiobasillic;  Surgeon: Derrek Grant MD;  Location: [facility] OR [address] [place] [address];  Service: Peripheral Vascular;  Laterality: Left;    FISTULOGRAM Left 2/4/2020    Procedure: Fistulogram;  Surgeon: Derrek Grant MD;  Location: [facility] OR [address] [place] [address];  Service: Peripheral Vascular;  Laterality: Left;  Fluro: 2.7 min  mGy: 23.74  contrast : 17ml    RENAL BIOPSY         Social History     Socioeconomic History    Marital status: Single     Spouse name: Not on file    Number of children: Not on file    Years of education: Not on file    Highest education level: Not on file   Occupational History    Not on file   Social Needs    Financial resource strain: Not on file    Food insecurity:     Worry: Not on file     Inability: Not on file    Transportation needs:     Medical: Not on file     Non-medical: Not on file   Tobacco Use    Smoking status: Never Smoker    Smokeless tobacco: Never Used   Substance and Sexual Activity    Alcohol use: No    Drug use: No    Sexual activity: Not on file   Lifestyle    Physical activity:     Days per week: Not on file     Minutes per session: Not on file    Stress: Not on file   Relationships    Social connections:     Talks on phone: Not on file     Gets together: Not on file     Attends [employer] service: Not on file     Active member of club or organization: Not on file     Attends meetings of clubs or organizations: Not  on file     Relationship status: Not on file   Other Topics Concern    Not on file   Social History Narrative    Not on file       OBJECTIVE:     Vital Signs Range (Last 24H):         Significant Labs:  Lab Results   Component Value Date    WBC 7.22 02/20/2020    HGB 10.8 (L) 02/20/2020    HCT 36.1 (L) 02/20/2020     02/20/2020    ALT 18 12/23/2019    AST 14 12/23/2019     01/30/2020    K 5.0 01/30/2020     01/30/2020    CREATININE 8.8 (H) 01/30/2020    BUN 70 (H) 01/30/2020    CO2 22 (L) 01/30/2020    TSH 2.668 11/15/2019    HGBA1C 5.2 11/15/2019       Diagnostic Studies: No relevant studies.    EKG:   Results for orders placed or performed during the hospital encounter of 12/23/19   SCHEDULED EKG 12-LEAD (to Muse)    Collection Time: 12/23/19 10:20 AM    Narrative    Test Reason : Z01.818,    Vent. Rate : 070 BPM     Atrial Rate : 070 BPM     P-R Int : 156 ms          QRS Dur : 098 ms      QT Int : 368 ms       P-R-T Axes : 042 -15 072 degrees     QTc Int : 397 ms    Normal sinus rhythm  Normal ECG  No previous ECGs available  Confirmed by LAURA MARY MD ([id number]) on 12/23/2019 11:22:17 AM    Referred By: NADIR VICENTE           Confirmed By:LAURA MARY MD       2D ECHO:  TTE:  No results found for this or any previous visit.    MIYA:  No results found for this or any previous visit.    ASSESSMENT/PLAN:        Anesthesia Evaluation    I have reviewed the Patient Summary Reports.    I have reviewed the Nursing Notes.   I have reviewed the Medications.     Review of Systems  Anesthesia Hx:  No problems with previous Anesthesia  History of prior surgery of interest to airway management or planning: Denies Family Hx of Anesthesia complications.   Denies Personal Hx of Anesthesia complications.   Hematology/Oncology:         -- Anemia:   EENT/Dental:EENT/Dental Normal   Cardiovascular:   Hypertension    Pulmonary:  Pulmonary Normal    Renal/:   Chronic Renal Disease, ESRD     Hepatic/GI:  Hepatic/GI Normal    Musculoskeletal:  Musculoskeletal Normal    Neurological:  Neurology Normal    Endocrine:  Endocrine Normal    Psych:  Psychiatric Normal           Physical Exam  General:  Well nourished    Airway/Jaw/Neck:  Airway Findings: Mouth Opening: Normal Tongue: Normal  Mallampati: II  TM Distance: Normal, at least 6 cm      Dental:  Dental Findings: In tact   Chest/Lungs:  Chest/Lungs Findings: Normal Respiratory Rate     Heart/Vascular:  Heart Findings: Rate: Normal  Rhythm: Regular Rhythm        Mental Status:  Mental Status Findings:  Cooperative, Alert and Oriented         Anesthesia Plan  Type of Anesthesia, risks & benefits discussed:  Anesthesia Type:  general, MAC, regional  Patient's Preference: same   Intra-op Monitoring Plan: standard ASA monitors  Intra-op Monitoring Plan Comments:   Post Op Pain Control Plan: multimodal analgesia, IV/PO Opioids PRN and per primary service following discharge from PACU  Post Op Pain Control Plan Comments:   Induction:   IV  Beta Blocker:  Patient is on a Beta-Blocker and has received one dose within the past 24 hours (No further documentation required).       Informed Consent: Patient understands risks and agrees with Anesthesia plan.  Questions answered. Anesthesia consent signed with patient.  ASA Score: 4     Day of Surgery Review of History & Physical:    H&P update referred to the surgeon.         Ready For Surgery From Anesthesia Perspective.

## 2020-03-01 RX ORDER — ACETAMINOPHEN 325 MG/1
325 TABLET ORAL EVERY 6 HOURS PRN
COMMUNITY

## 2020-03-02 ENCOUNTER — HOSPITAL ENCOUNTER (OUTPATIENT)
Facility: HOSPITAL | Age: 50
Discharge: HOME | End: 2020-03-02
Attending: SURGERY | Admitting: SURGERY
Payer: MEDICAID

## 2020-03-02 ENCOUNTER — ANESTHESIA (OUTPATIENT)
Dept: SURGERY | Facility: HOSPITAL | Age: 50
End: 2020-03-02
Payer: MEDICAID

## 2020-03-02 VITALS
TEMPERATURE: 98 F | RESPIRATION RATE: 16 BRPM | OXYGEN SATURATION: 99 % | HEART RATE: 62 BPM | SYSTOLIC BLOOD PRESSURE: 150 MMHG | DIASTOLIC BLOOD PRESSURE: 94 MMHG

## 2020-03-02 DIAGNOSIS — N18.6 CKD (CHRONIC KIDNEY DISEASE) STAGE V REQUIRING CHRONIC DIALYSIS: Primary | ICD-10-CM

## 2020-03-02 DIAGNOSIS — Z99.2 CKD (CHRONIC KIDNEY DISEASE) STAGE V REQUIRING CHRONIC DIALYSIS: Primary | ICD-10-CM

## 2020-03-02 PROCEDURE — 36000707: Mod: NTX | Performed by: SURGERY

## 2020-03-02 PROCEDURE — 63600175 PHARM REV CODE 636 W HCPCS: Mod: TXP | Performed by: ANESTHESIOLOGY

## 2020-03-02 PROCEDURE — 37000008 HC ANESTHESIA 1ST 15 MINUTES: Mod: NTX | Performed by: SURGERY

## 2020-03-02 PROCEDURE — 63600175 PHARM REV CODE 636 W HCPCS: Mod: NTX | Performed by: SURGERY

## 2020-03-02 PROCEDURE — 71000015 HC POSTOP RECOV 1ST HR: Mod: TXP | Performed by: SURGERY

## 2020-03-02 PROCEDURE — 27201423 OPTIME MED/SURG SUP & DEVICES STERILE SUPPLY: Mod: TXP | Performed by: SURGERY

## 2020-03-02 PROCEDURE — 25000003 PHARM REV CODE 250: Mod: TXP | Performed by: STUDENT IN AN ORGANIZED HEALTH CARE EDUCATION/TRAINING PROGRAM

## 2020-03-02 PROCEDURE — 01844 ANES VASC SHUNT/SHUNT REVJ: CPT | Mod: NTX | Performed by: SURGERY

## 2020-03-02 PROCEDURE — 37000009 HC ANESTHESIA EA ADD 15 MINS: Mod: NTX | Performed by: SURGERY

## 2020-03-02 PROCEDURE — 71000044 HC DOSC ROUTINE RECOVERY FIRST HOUR: Mod: NTX | Performed by: SURGERY

## 2020-03-02 PROCEDURE — 71000016 HC POSTOP RECOV ADDL HR: Mod: NTX | Performed by: SURGERY

## 2020-03-02 PROCEDURE — 36832 AV FISTULA REVISION OPEN: CPT | Mod: 78,NTX,, | Performed by: SURGERY

## 2020-03-02 PROCEDURE — 36000706: Mod: NTX | Performed by: SURGERY

## 2020-03-02 PROCEDURE — 36832 PR AV FISTULA REVISION, OPEN, W/O THROMBECTOMY: ICD-10-PCS | Mod: 78,NTX,, | Performed by: SURGERY

## 2020-03-02 PROCEDURE — D9220A PRA ANESTHESIA: ICD-10-PCS | Mod: TXP,,, | Performed by: ANESTHESIOLOGY

## 2020-03-02 PROCEDURE — 27200750 HC INSULATED NEEDLE/ STIMUPLEX: Mod: NTX | Performed by: ANESTHESIOLOGY

## 2020-03-02 PROCEDURE — D9220A PRA ANESTHESIA: Mod: TXP,,, | Performed by: ANESTHESIOLOGY

## 2020-03-02 PROCEDURE — 63600175 PHARM REV CODE 636 W HCPCS: Mod: NTX | Performed by: STUDENT IN AN ORGANIZED HEALTH CARE EDUCATION/TRAINING PROGRAM

## 2020-03-02 RX ORDER — CEFAZOLIN SODIUM 1 G/3ML
INJECTION, POWDER, FOR SOLUTION INTRAMUSCULAR; INTRAVENOUS
Status: DISCONTINUED | OUTPATIENT
Start: 2020-03-02 | End: 2020-03-02

## 2020-03-02 RX ORDER — FENTANYL CITRATE 50 UG/ML
25 INJECTION, SOLUTION INTRAMUSCULAR; INTRAVENOUS EVERY 5 MIN PRN
Status: DISCONTINUED | OUTPATIENT
Start: 2020-03-02 | End: 2020-03-02 | Stop reason: HOSPADM

## 2020-03-02 RX ORDER — CEFAZOLIN SODIUM 1 G/3ML
2 INJECTION, POWDER, FOR SOLUTION INTRAMUSCULAR; INTRAVENOUS
Status: ACTIVE | OUTPATIENT
Start: 2020-03-02

## 2020-03-02 RX ORDER — SODIUM CHLORIDE 0.9 % (FLUSH) 0.9 %
10 SYRINGE (ML) INJECTION
Status: DISCONTINUED | OUTPATIENT
Start: 2020-03-02 | End: 2020-03-02 | Stop reason: HOSPADM

## 2020-03-02 RX ORDER — SODIUM CHLORIDE 9 MG/ML
INJECTION, SOLUTION INTRAVENOUS CONTINUOUS PRN
Status: DISCONTINUED | OUTPATIENT
Start: 2020-03-02 | End: 2020-03-02

## 2020-03-02 RX ORDER — LIDOCAINE HYDROCHLORIDE 10 MG/ML
1 INJECTION, SOLUTION EPIDURAL; INFILTRATION; INTRACAUDAL; PERINEURAL ONCE
Status: COMPLETED | OUTPATIENT
Start: 2020-03-02 | End: 2020-03-02

## 2020-03-02 RX ORDER — OXYCODONE AND ACETAMINOPHEN 5; 325 MG/1; MG/1
1 TABLET ORAL EVERY 6 HOURS PRN
Qty: 20 TABLET | Refills: 0 | Status: SHIPPED | OUTPATIENT
Start: 2020-03-02 | End: 2020-05-06 | Stop reason: ALTCHOICE

## 2020-03-02 RX ORDER — HEPARIN SODIUM 1000 [USP'U]/ML
INJECTION, SOLUTION INTRAVENOUS; SUBCUTANEOUS
Status: DISCONTINUED | OUTPATIENT
Start: 2020-03-02 | End: 2020-03-02 | Stop reason: HOSPADM

## 2020-03-02 RX ORDER — MIDAZOLAM HYDROCHLORIDE 1 MG/ML
INJECTION, SOLUTION INTRAMUSCULAR; INTRAVENOUS
Status: DISCONTINUED | OUTPATIENT
Start: 2020-03-02 | End: 2020-03-02

## 2020-03-02 RX ORDER — PROPOFOL 10 MG/ML
VIAL (ML) INTRAVENOUS
Status: DISCONTINUED | OUTPATIENT
Start: 2020-03-02 | End: 2020-03-02

## 2020-03-02 RX ORDER — PROPOFOL 10 MG/ML
VIAL (ML) INTRAVENOUS CONTINUOUS PRN
Status: DISCONTINUED | OUTPATIENT
Start: 2020-03-02 | End: 2020-03-02

## 2020-03-02 RX ORDER — SODIUM CHLORIDE 0.9 % (FLUSH) 0.9 %
10 SYRINGE (ML) INJECTION
Status: ACTIVE | OUTPATIENT
Start: 2020-03-02

## 2020-03-02 RX ORDER — MUPIROCIN 20 MG/G
OINTMENT TOPICAL
Status: DISPENSED | OUTPATIENT
Start: 2020-03-02

## 2020-03-02 RX ADMIN — LIDOCAINE HYDROCHLORIDE 0.3 MG: 10 INJECTION, SOLUTION EPIDURAL; INFILTRATION; INTRACAUDAL; PERINEURAL at 08:03

## 2020-03-02 RX ADMIN — PROPOFOL 20 MG: 10 INJECTION, EMULSION INTRAVENOUS at 11:03

## 2020-03-02 RX ADMIN — MUPIROCIN: 20 OINTMENT TOPICAL at 08:03

## 2020-03-02 RX ADMIN — SODIUM CHLORIDE: 0.9 INJECTION, SOLUTION INTRAVENOUS at 09:03

## 2020-03-02 RX ADMIN — PROPOFOL 30 MG: 10 INJECTION, EMULSION INTRAVENOUS at 11:03

## 2020-03-02 RX ADMIN — MIDAZOLAM HYDROCHLORIDE 2 MG: 1 INJECTION, SOLUTION INTRAMUSCULAR; INTRAVENOUS at 10:03

## 2020-03-02 RX ADMIN — PROPOFOL 30 MCG/KG/MIN: 10 INJECTION, EMULSION INTRAVENOUS at 10:03

## 2020-03-02 RX ADMIN — MEPIVACAINE HYDROCHLORIDE 30 ML: 15 INJECTION, SOLUTION EPIDURAL; INFILTRATION at 10:03

## 2020-03-02 RX ADMIN — CEFAZOLIN 2 G: 330 INJECTION, POWDER, FOR SOLUTION INTRAMUSCULAR; INTRAVENOUS at 10:03

## 2020-03-02 RX ADMIN — PROPOFOL 30 MG: 10 INJECTION, EMULSION INTRAVENOUS at 10:03

## 2020-03-02 RX ADMIN — PROPOFOL 10 MG: 10 INJECTION, EMULSION INTRAVENOUS at 11:03

## 2020-03-02 NOTE — ANESTHESIA PROCEDURE NOTES
Supraclav and ICB    Patient location during procedure: OR    Reason for block: primary anesthetic   Diagnosis: L arm pain   Start time: 3/2/2020 10:07 AM  Timeout: 3/2/2020 10:06 AM   End time: 3/2/2020 10:16 AM    Staffing  Authorizing Provider: Josse Platt MD  Performing Provider: Yadira Morales MD    Preanesthetic Checklist  Completed: patient identified, site marked, surgical consent, pre-op evaluation, timeout performed, IV checked, risks and benefits discussed and monitors and equipment checked  Peripheral Block  Patient position: supine  Prep: ChloraPrep  Patient monitoring: heart rate, cardiac monitor, continuous pulse ox, continuous capnometry and frequent blood pressure checks  Block type: supraclavicular  Laterality: left  Injection technique: single shot  Needle  Needle type: Stimuplex   Needle gauge: 22 G  Needle length: 2 in  Needle localization: anatomical landmarks and ultrasound guidance   -ultrasound image captured on disc.  Assessment  Injection assessment: negative aspiration, negative parasthesia and local visualized surrounding nerve  Paresthesia pain: none  Heart rate change: no  Slow fractionated injection: yes  Additional Notes  Intercostal brachial field block performed with mepivacaine 1.5% 10ml for additional coverage.    VSS.  See anesthesia record.  Patient tolerated procedure well.

## 2020-03-02 NOTE — DISCHARGE INSTRUCTIONS
Arteriovenous (AV) Fistula for Dialysis  An AV fistula is a connection between an artery and a vein. For this procedure, an AV fistula is surgically created using an artery and a vein in your arm. (Your healthcare provider will let you know if another site is to be used.) When the artery and vein are joined, blood flow increases from the artery into the vein. As a result, the vein gets bigger over time. The enlarged vein provides easier access to the blood for a treatment for kidney failure (dialysis). This sheet explains the procedure and what to expect.     An AV fistula increases blood flow from the artery into the vein. Over time, the vein becomes stronger and enlarged.   Preparing for the procedure  Prepare as you have been told. In addition:  · Tell your healthcare provider about all the medicines you take. This includes all over-the-counter and prescription medicines, and street drugs. It also includes herbs, vitamins, and other supplements. You may need to stop taking some or all of them before the procedure.  · Follow any directions youre given for not eating or drinking before the procedure.  · Do not allow anyone to draw blood from or take blood pressure on the arm that will have the fistula before the procedure.  The day of the procedure  The procedure takes about 1 to 2 hours. Youll likely go home the same day.  Before the procedure begins:  · An IV (intravenous) line is put into a vein in the arm or hand not being used for the procedure. This line supplies fluids and medicines.  · To keep you free of pain during the procedure, youre given general anesthesia. This medicine puts you into a state like a deep sleep through the procedure. Or a nerve block may be used. This medicine numbs the arm. With it, you may also be given medicine that makes you relaxed and drowsy through the procedure.  During the procedure:  · The skin over your arm may be injected with numbing medicine.  · One or more small  cuts (incisions) are then made through the numbed skin. This depends on the size of your arm and the depth of the vein in your arm.  · The vein is attached to the selected artery.  · Any incisions made are then closed with stitches (sutures), staples, surgical glue, or strips of surgical tape.  After the procedure:  · Youll be asked to keep your arm raised (elevated) as often as possible for at least a week after the procedure.  · Youll be given medicines to manage pain as needed.  · Your arm and hand will be checked to make sure blood is flowing through the fistula properly. The feeling of blood rushing through the fistula is called a thrill. It is somewhat similar to the purring of a cat. Youll be taught how to check for this feeling each day to make sure there are no problems with your fistula. Youll also be taught how to care for your fistula at home.  · When its time for you to leave the hospital, have an adult family member or friend ready to drive you home.  Recovering at home  Once at home, follow all of the instructions youve been given. Be sure to:  · Take all medicines as directed.  · Care for your incision as instructed.  · Check for signs of infection at the incision site (see below).  · Avoid heavy lifting and strenuous activities as directed.  · Monitor and care for your fistula as instructed.  · Do your hand and arm exercises as instructed. This usually involves squeezing a ball in your hand for a few minutes each hour.  Call your healthcare provider if you have any of the following:  · Fever of 100.4°F (38°C) or higher  · Signs of infection at the incision site, such as increased redness or swelling, warmth, worsening pain, bleeding, or bad-smelling drainage  · You cant feel a thrill (the vibration of blood going through your arm)  · Pain or numbness in your fingers, hand, or arm  · Bleeding, redness, or warmth around your fistula  · Sudden bulging of the fistula (more than usual; a slight  bulge is normal)   Follow-Up  Your healthcare provider will check your fistula within 1 to 2 weeks after the procedure. It will likely take about 6 to 8 weeks for the fistula to enlarge enough to start dialysis. After that, make sure the fistula is checked each time you have dialysis. Your healthcare provider may also suggest checkups every 6 months.  Risks and possible complications include:  · The fistula not working properly  · Long wait before the fistula is ready (up to 6 months)  · Coldness or numbness in the hand (due to blood flowing away from the hand and into the fistula)  · An unsightly bump under the skin (due to enlargement of the fistula)  · Prolonged bleeding from the fistula after dialysis  · Narrowing or weakening of the blood vessels used for the fistula  · Formation of blood clots in the blood vessels used for the fistula  · Risks of anesthesia or any other medicines used during the procedure   Living with an AV Fistula  A problem, such as a narrowing (stricture) of the vein or an infection, can make the fistula unusable. If this happens, you may need other treatments to repair or make a new fistula. To protect your fistula, follow these and any other guidelines youre given:  · Check your fistula as often as your healthcare provider says. If you cant feel your thrill, let your provider know right away.  · Make sure your fistula is checked before each dialysis treatment.  · Dont let anyone draw blood from or take blood pressure on the arm that has the fistula.  · Wash your hands often and keep the area around your fistula clean.  · Dont sleep on the arm that has the fistula.  · Dont wear tight jewelry or a watch on the arm with your fistula.  · Protect your fistula from cuts, scrapes, or blows.  Date Last Reviewed: 1/1/2017  © 3701-2719 [employer]. [address] [place] [address], [place] [address]. All rights reserved. This information is not intended as a substitute for professional  medical care. Always follow your healthcare professional's instructions.

## 2020-03-02 NOTE — ANESTHESIA POSTPROCEDURE EVALUATION
Anesthesia Post Evaluation    Patient: Misael Owens    Procedure(s) Performed: Procedure(s) (LRB):  TRANSPOSITION, VEIN, BASILIC (Left)    Final Anesthesia Type: regional    Patient location during evaluation: PACU  Patient participation: Yes- Able to Participate  Level of consciousness: awake and alert  Post-procedure vital signs: reviewed and stable  Pain management: adequate  Airway patency: patent    PONV status at discharge: No PONV  Anesthetic complications: no      Cardiovascular status: blood pressure returned to baseline  Respiratory status: unassisted, spontaneous ventilation and room air  Hydration status: euvolemic            Vitals Value Taken Time   /94 3/2/2020  1:16 PM   Temp 36.7 °C (98 °F) 3/2/2020  1:15 PM   Pulse 66 3/2/2020  1:23 PM   Resp 18 3/2/2020  1:22 PM   SpO2 99 % 3/2/2020  1:23 PM   Vitals shown include unvalidated device data.      No case tracking events are documented in the log.      Pain/Kian Score: Kian Score: 10 (3/2/2020  1:25 PM)

## 2020-03-02 NOTE — PRE-PROCEDURE INSTRUCTIONS
Spoke with Patient.  NPO, medication, and pre-op instructions reviewed.  Denies previous problems with Anesthesia.  Has someone coming with him for surgery.  Verbalized understanding of instructions.

## 2020-03-02 NOTE — INTERVAL H&P NOTE
The patient has been examined and the H&P has been reviewed:    I concur with the findings and no changes have occurred since H&P was written.    Anesthesia/Surgery risks, benefits and alternative options discussed and understood by patient/family.          Active Hospital Problems    Diagnosis  POA    CKD (chronic kidney disease) stage V requiring chronic dialysis [N18.6, Z99.2]  Not Applicable      Resolved Hospital Problems   No resolved problems to display.

## 2020-03-02 NOTE — PROGRESS NOTES
Resting in bed, friend at bedside, stating she will keep patient's belongings during procedure. Patient advised to remove all clothing including underwear, verbalized  Understanding.   Left upper arm fistula positive for thrill and bruit. Patient educated on the use of call bell, verbalized understanding.

## 2020-03-02 NOTE — BRIEF OP NOTE
[facility]  Brief Operative Note    Surgery Date: 3/2/2020     Surgeon(s) and Role:     * Derrek Grant MD - Primary     * Zaria Benson MD - Resident - Assisting    Pre-op Diagnosis:  ESRD (end stage renal disease) [N18.6]    Post-op Diagnosis:  Post-Op Diagnosis Codes:     * ESRD (end stage renal disease) [N18.6]    Procedure(s) (LRB):  TRANSPOSITION, VEIN, BASILIC (Left)    Description of the findings of the procedure(s): left brachiobascilic AVF transposition.     Estimated Blood Loss: minimal         Specimens:   Specimen (12h ago, onward)    None        Dispo: Patient tolerated procedure well. Transported to PACU in stable condition     GRICEL Benson MD   General Surgery- PGYIII  [phone]

## 2020-03-02 NOTE — DISCHARGE SUMMARY
[facility]  Vascular Surgery  Discharge Summary      Patient Name: Misael Owens  MRN: [id number]  Admission Date: 3/2/2020  Hospital Length of Stay: 0 days  Discharge Date and Time:  03/02/2020 12:05 PM  Attending Physician: Derrek Grant MD   Discharging Provider: Zaria Benson MD  Primary Care Provider: Katharina Ricci MD     Procedure(s) (LRB):  TRANSPOSITION, VEIN, BASILIC (Left)     Hospital Course: Patient presented for scheduled procedure today. Tolerated the procedure well, without complication. Extubated in OR and transferred to recovery. Plan to discharge home today with follow up appointment in 2 weeks.      Pending Diagnostic Studies:     None        Final Active Diagnoses:    Diagnosis Date Noted POA    PRINCIPAL PROBLEM:  CKD (chronic kidney disease) stage V requiring chronic dialysis [N18.6, Z99.2] 03/02/2020 Not Applicable      Problems Resolved During this Admission:      Discharged Condition: good    Disposition: Home or Self Care    Follow Up:  Follow-up Information     Derrek Grant MD In 2 weeks.    Specialty:  Vascular Surgery  Why:  with ultrasound  Contact information:  [address] [place] [id number]  [phone]                 Patient Instructions:      VAS US Hemodialysis Access   Standing Status: Future Standing Exp. Date: 03/02/21   Order Comments: Left AVF- eval prior to 2 week post operative clinic appointment     Diet Adult Regular     Notify your health care provider if you experience any of the following:  increased confusion or weakness     Notify your health care provider if you experience any of the following:  persistent dizziness, light-headedness, or visual disturbances     Notify your health care provider if you experience any of the following:  worsening rash     Notify your health care provider if you experience any of the following:  severe persistent headache     Notify your health care provider if you experience any of the  following:  difficulty breathing or increased cough     Notify your health care provider if you experience any of the following:  redness, tenderness, or signs of infection (pain, swelling, redness, odor or green/yellow discharge around incision site)     Notify your health care provider if you experience any of the following:  severe uncontrolled pain     Notify your health care provider if you experience any of the following:  persistent nausea and vomiting or diarrhea     Notify your health care provider if you experience any of the following:  temperature >100.4     No dressing needed   Order Comments: Incisions covered with purple skin glue. Okay to get wet in the shower. Glue will start to peel off in 7-10 days     Activity as tolerated     Shower on day dressing removed (No bath)   Order Comments: Okay to take a shower in 48 hours after surgery. Do not soak/submerge incision (aka no bathing, swimming)until cleared in clinic.     Medications:  Reconciled Home Medications:      Medication List      CHANGE how you take these medications    calcitRIOL 0.5 MCG Cap  Commonly known as:  ROCALTROL  Take 1 capsule (0.5 mcg total) by mouth once daily.  What changed:  when to take this     ergocalciferol 50,000 unit Cap  Commonly known as:  ERGOCALCIFEROL  Take 1 capsule (50,000 Units total) by mouth every 7 days.  What changed:  additional instructions     furosemide 40 MG tablet  Commonly known as:  LASIX  Take 1 tablet (40 mg total) by mouth once daily.  What changed:  when to take this        CONTINUE taking these medications    acetaminophen 325 MG tablet  Commonly known as:  TYLENOL  Take 325 mg by mouth every 6 (six) hours as needed for Pain. Takes 1-2 tablets prn     labetalol 200 MG tablet  Commonly known as:  NORMODYNE  Take 200 mg by mouth every 12 (twelve) hours.     oxyCODONE-acetaminophen 5-325 mg per tablet  Commonly known as:  PERCOCET  Take 1 tablet by mouth every 6 (six) hours as needed for Pain.      sodium bicarbonate 650 MG tablet  TAKE TWO TABLETS BY MOUTH TWICE DAILY            Zaria Benson MD  General Surgery  [facility]

## 2020-03-02 NOTE — TRANSFER OF CARE
Anesthesia Transfer of Care Note    Patient: Misael Owens    Procedure(s) Performed: Procedure(s) (LRB):  TRANSPOSITION, VEIN, BASILIC (Left)    Patient location: [facility]    Anesthesia Type: MAC    Transport from OR: Transported from OR on 6-10 L/min O2 by face mask with adequate spontaneous ventilation    Post pain: adequate analgesia    Post assessment: no apparent anesthetic complications and tolerated procedure well    Post vital signs: stable    Level of consciousness: awake and alert    Nausea/Vomiting: no nausea/vomiting    Complications: none          Last vitals:   Visit Vitals  /61   Pulse 65   Temp 36.7 °C (98 °F) (Temporal)   Resp 18   SpO2 100%

## 2020-03-02 NOTE — OP NOTE
Operative note    Surgery Date: 3/2/2020      Surgeon(s) and Role:     * Derrek Grant MD - Primary     * Zaria Benson MD - Resident - Assisting     Pre-op Diagnosis:  ESRD (end stage renal disease) [N18.6]     Post-op Diagnosis:  Post-Op Diagnosis Codes:     * ESRD (end stage renal disease) [N18.6]     Procedure(s) (LRB):  TRANSPOSITION, VEIN, BASILIC (Left)     Description of the findings of the procedure(s):     The patient is a 50-year-old male well known to me with CKD stage 5.  He had undergone brachial basilic arteriovenous fistula creation over 6 months ago and presents undo and had presented for second-stage basilic vein transposition.    The patient was identified as Misael Roman in the preoperative unit and brought to the operating room. He was positioned supine on the operating room table and a regional block of the left arm was performed by the anesthesia team.  He was positioned supine on the operating room table and his left upper extremity was prepped and draped in standard circumferential fashion. After a team wide time-out during which the procedure and laterality were agreed upon by all operating room staff a longitudinal incision was made in the inner upper left arm.  The thrill in the fistula could be easily palpated. Using a combination of sharp and blunt dissection the proximal portion of the fistula of the inflow portion was identified dissected free from surrounding tissues and surrounded with a vessel vessel tape.  Small side branches of the vein in progressive fashion were ligated and divided as more and more of the fistula was exposed.  In the upper 3rd of the arm I encountered a very large confluence of veins which must have been the remnant brachial vein and basilic confluence.  Rather than ligate this very large remaining 2 cm vein diving deep in the arm I preserved it and continued to mobilize the large vein to the upper arm.  I avoided like getting this side branch because I  felt it would leave the patient without any adequate arm venous drainage and lead to intractable arm swelling. I have not encountered anatomy is such in the past.    Mobilizing the full length of the fistula and the vein and distal to this confluence there was adequate length to perform a transposition.  Again the brachial basilic confluence was left intact. A smooth arc of the vein under a flap of upper arm tissue superficial to the muscle was created without difficulty. The fascial layer was closed posterior to the vein with 2 0 Vicryl sutures and the skin was closed with a running 4 0 monofilament suture. Prior to closure of the wound was noted to be hemostatic. At the completion of the procedure the patient had a new easily palpable thrill and and the procedure was terminated.  He was transferred to the postanesthesia care unit in stable condition.    I Derrek Grant MD was present for the entirety of the operation.    Derrek Grant MD  Vascular & Endovascular Surgery        Estimated Blood Loss: minimal         Specimens:       Specimen (12h ago, onward)     None          Dispo: Patient tolerated procedure well. Transported to PACU in stable condition

## 2020-03-13 ENCOUNTER — TELEPHONE (OUTPATIENT)
Dept: INTERNAL MEDICINE | Facility: CLINIC | Age: 50
End: 2020-03-13

## 2020-03-13 ENCOUNTER — PATIENT MESSAGE (OUTPATIENT)
Dept: INTERNAL MEDICINE | Facility: CLINIC | Age: 50
End: 2020-03-13

## 2020-03-16 ENCOUNTER — TELEPHONE (OUTPATIENT)
Dept: NEPHROLOGY | Facility: CLINIC | Age: 50
End: 2020-03-16

## 2020-03-16 ENCOUNTER — OFFICE VISIT (OUTPATIENT)
Dept: INTERNAL MEDICINE | Facility: CLINIC | Age: 50
End: 2020-03-16
Payer: COMMERCIAL

## 2020-03-16 DIAGNOSIS — Z03.89 NO DIAGNOSIS ON AXIS I: ICD-10-CM

## 2020-03-16 PROCEDURE — 99499 NO LOS: ICD-10-PCS | Mod: GT,,, | Performed by: INTERNAL MEDICINE

## 2020-03-16 PROCEDURE — 99499 UNLISTED E&M SERVICE: CPT | Mod: GT,,, | Performed by: INTERNAL MEDICINE

## 2020-03-16 NOTE — TELEPHONE ENCOUNTER
Preferred Name:   Misael Owens  Male, 50 y.o., 1970  Phone:   [phone] (M)  PCP:   Katharina Ricci MD  Language:   English  Need Interp:   No  Allergies Last Reviewed:   03/02/20  Allergies:   Lisinopril  Health Maintenance:   Due  Primary Ins.:   MEDICAID  MRN:   [id number]  Pt Comm Pref:   Patient Portal, Mail  Last MyChart Login:   3/16/2020 8:43 AM, Mobile  Next Appt:   With Nephrology (Matthew Moreno DO)  03/17/2020 at 9:30 AM  My Sticky Note:     Specialty Comments:     Message   Received: Today      Appointment Access   Message Contents   MANUEL Johnson Staff   Caller: [phone] (Today,  8:13 AM)             Requesting a call back tos et up a Virtual appt for appt on Tomorrow.

## 2020-03-17 ENCOUNTER — TELEPHONE (OUTPATIENT)
Dept: NEPHROLOGY | Facility: CLINIC | Age: 50
End: 2020-03-17

## 2020-03-17 DIAGNOSIS — N18.5 STAGE 5 CHRONIC KIDNEY DISEASE: Primary | ICD-10-CM

## 2020-03-17 DIAGNOSIS — N18.4 STAGE 4 CHRONIC KIDNEY DISEASE: ICD-10-CM

## 2020-03-17 NOTE — TELEPHONE ENCOUNTER
He has no recent labs since jan and has advanced kidney disease.  Please have him get rfp, I pth, hgb, hct, iron studies, vit d, urine protein ratio and please ask him if he feels well overall.

## 2020-03-18 RX ORDER — SODIUM BICARBONATE 650 MG/1
1300 TABLET ORAL 2 TIMES DAILY
Qty: 120 TABLET | Refills: 0 | Status: ON HOLD | OUTPATIENT
Start: 2020-03-18 | End: 2020-04-02 | Stop reason: HOSPADM

## 2020-03-20 ENCOUNTER — TELEPHONE (OUTPATIENT)
Dept: NEPHROLOGY | Facility: CLINIC | Age: 50
End: 2020-03-20

## 2020-03-20 DIAGNOSIS — Z76.82 ORGAN TRANSPLANT CANDIDATE: Primary | ICD-10-CM

## 2020-03-26 ENCOUNTER — PATIENT MESSAGE (OUTPATIENT)
Dept: VASCULAR SURGERY | Facility: CLINIC | Age: 50
End: 2020-03-26

## 2020-03-27 ENCOUNTER — LAB VISIT (OUTPATIENT)
Dept: LAB | Facility: HOSPITAL | Age: 50
End: 2020-03-27
Attending: INTERNAL MEDICINE
Payer: MEDICAID

## 2020-03-27 DIAGNOSIS — N18.5 CKD STAGE 5 SECONDARY TO HYPERTENSION: ICD-10-CM

## 2020-03-27 DIAGNOSIS — I10 ESSENTIAL HYPERTENSION: ICD-10-CM

## 2020-03-27 DIAGNOSIS — R80.9 PROTEINURIA, UNSPECIFIED TYPE: ICD-10-CM

## 2020-03-27 DIAGNOSIS — N18.5 STAGE 5 CHRONIC KIDNEY DISEASE: ICD-10-CM

## 2020-03-27 DIAGNOSIS — I12.0 CKD STAGE 5 SECONDARY TO HYPERTENSION: ICD-10-CM

## 2020-03-27 DIAGNOSIS — N18.4 STAGE 4 CHRONIC KIDNEY DISEASE: ICD-10-CM

## 2020-03-27 DIAGNOSIS — N25.81 SECONDARY HYPERPARATHYROIDISM: ICD-10-CM

## 2020-03-27 LAB
25(OH)D3+25(OH)D2 SERPL-MCNC: 28 NG/ML (ref 30–96)
ALBUMIN SERPL BCP-MCNC: 3.7 G/DL (ref 3.5–5.2)
ANION GAP SERPL CALC-SCNC: 8 MMOL/L (ref 8–16)
BUN SERPL-MCNC: 64 MG/DL (ref 6–20)
CALCIUM SERPL-MCNC: 9 MG/DL (ref 8.7–10.5)
CHLORIDE SERPL-SCNC: 110 MMOL/L (ref 95–110)
CO2 SERPL-SCNC: 19 MMOL/L (ref 23–29)
CREAT SERPL-MCNC: 9.5 MG/DL (ref 0.5–1.4)
EST. GFR  (AFRICAN AMERICAN): 6.6 ML/MIN/1.73 M^2
EST. GFR  (NON AFRICAN AMERICAN): 5.7 ML/MIN/1.73 M^2
GLUCOSE SERPL-MCNC: 94 MG/DL (ref 70–110)
PHOSPHATE SERPL-MCNC: 4.7 MG/DL (ref 2.7–4.5)
POTASSIUM SERPL-SCNC: 6.1 MMOL/L (ref 3.5–5.1)
PTH-INTACT SERPL-MCNC: 410 PG/ML (ref 9–77)
SODIUM SERPL-SCNC: 137 MMOL/L (ref 136–145)

## 2020-03-27 PROCEDURE — 83970 ASSAY OF PARATHORMONE: CPT | Mod: TXP

## 2020-03-27 PROCEDURE — 36415 COLL VENOUS BLD VENIPUNCTURE: CPT | Mod: NTX

## 2020-03-27 PROCEDURE — 82306 VITAMIN D 25 HYDROXY: CPT | Mod: NTX

## 2020-03-27 PROCEDURE — 80069 RENAL FUNCTION PANEL: CPT | Mod: NTX

## 2020-03-30 ENCOUNTER — TELEPHONE (OUTPATIENT)
Dept: NEPHROLOGY | Facility: CLINIC | Age: 50
End: 2020-03-30

## 2020-03-30 ENCOUNTER — HOSPITAL ENCOUNTER (INPATIENT)
Facility: HOSPITAL | Age: 50
LOS: 4 days | Discharge: HOME | DRG: 673 | End: 2020-04-03
Attending: EMERGENCY MEDICINE | Admitting: FAMILY MEDICINE
Payer: MEDICAID

## 2020-03-30 DIAGNOSIS — I12.0: Primary | ICD-10-CM

## 2020-03-30 DIAGNOSIS — N18.5 ANEMIA OF CHRONIC KIDNEY FAILURE, STAGE 5: ICD-10-CM

## 2020-03-30 DIAGNOSIS — E87.20 METABOLIC ACIDOSIS: ICD-10-CM

## 2020-03-30 DIAGNOSIS — Z99.2 END STAGE RENAL FAILURE ON DIALYSIS: ICD-10-CM

## 2020-03-30 DIAGNOSIS — E87.5 HYPERKALEMIA: ICD-10-CM

## 2020-03-30 DIAGNOSIS — N18.6 END STAGE RENAL FAILURE ON DIALYSIS: ICD-10-CM

## 2020-03-30 DIAGNOSIS — N18.6 ESRD (END STAGE RENAL DISEASE): ICD-10-CM

## 2020-03-30 DIAGNOSIS — D63.1 ANEMIA OF CHRONIC KIDNEY FAILURE, STAGE 5: ICD-10-CM

## 2020-03-30 PROBLEM — E66.812 CLASS 2 SEVERE OBESITY WITH SERIOUS COMORBIDITY IN ADULT: Status: ACTIVE | Noted: 2020-03-30

## 2020-03-30 PROBLEM — E66.01 OBESITY, CLASS III, BMI 40-49.9 (MORBID OBESITY): Status: ACTIVE | Noted: 2020-03-30

## 2020-03-30 LAB
ALBUMIN SERPL BCP-MCNC: 3.8 G/DL (ref 3.5–5.2)
ALP SERPL-CCNC: 65 U/L (ref 55–135)
ALT SERPL W/O P-5'-P-CCNC: 18 U/L (ref 10–44)
ANION GAP SERPL CALC-SCNC: 10 MMOL/L (ref 8–16)
AST SERPL-CCNC: 9 U/L (ref 10–40)
BASOPHILS # BLD AUTO: 0.06 K/UL (ref 0–0.2)
BASOPHILS NFR BLD: 1 % (ref 0–1.9)
BILIRUB SERPL-MCNC: 0.4 MG/DL (ref 0.1–1)
BUN SERPL-MCNC: 67 MG/DL (ref 6–20)
CALCIUM SERPL-MCNC: 9 MG/DL (ref 8.7–10.5)
CHLORIDE SERPL-SCNC: 110 MMOL/L (ref 95–110)
CO2 SERPL-SCNC: 19 MMOL/L (ref 23–29)
CREAT SERPL-MCNC: 9.1 MG/DL (ref 0.5–1.4)
CREAT UR-MCNC: 81.2 MG/DL (ref 23–375)
CRP SERPL-MCNC: 7 MG/L (ref 0–8.2)
DIFFERENTIAL METHOD: ABNORMAL
EOSINOPHIL # BLD AUTO: 0.6 K/UL (ref 0–0.5)
EOSINOPHIL NFR BLD: 10.4 % (ref 0–8)
ERYTHROCYTE [DISTWIDTH] IN BLOOD BY AUTOMATED COUNT: 14.3 % (ref 11.5–14.5)
ERYTHROCYTE [SEDIMENTATION RATE] IN BLOOD BY WESTERGREN METHOD: 43 MM/HR (ref 0–10)
EST. GFR  (AFRICAN AMERICAN): 7 ML/MIN/1.73 M^2
EST. GFR  (NON AFRICAN AMERICAN): 6 ML/MIN/1.73 M^2
GLUCOSE SERPL-MCNC: 103 MG/DL (ref 70–110)
HCT VFR BLD AUTO: 32.1 % (ref 40–54)
HGB BLD-MCNC: 9.9 G/DL (ref 14–18)
IMM GRANULOCYTES # BLD AUTO: 0.03 K/UL (ref 0–0.04)
IMM GRANULOCYTES NFR BLD AUTO: 0.5 % (ref 0–0.5)
LYMPHOCYTES # BLD AUTO: 1.3 K/UL (ref 1–4.8)
LYMPHOCYTES NFR BLD: 22 % (ref 18–48)
MAGNESIUM SERPL-MCNC: 1.6 MG/DL (ref 1.6–2.6)
MCH RBC QN AUTO: 27.3 PG (ref 27–31)
MCHC RBC AUTO-ENTMCNC: 30.8 G/DL (ref 32–36)
MCV RBC AUTO: 89 FL (ref 82–98)
MONOCYTES # BLD AUTO: 0.5 K/UL (ref 0.3–1)
MONOCYTES NFR BLD: 7.9 % (ref 4–15)
NEUTROPHILS # BLD AUTO: 3.5 K/UL (ref 1.8–7.7)
NEUTROPHILS NFR BLD: 58.2 % (ref 38–73)
NRBC BLD-RTO: 0 /100 WBC
PHOSPHATE SERPL-MCNC: 4.3 MG/DL (ref 2.7–4.5)
PLATELET # BLD AUTO: 223 K/UL (ref 150–350)
PMV BLD AUTO: 9.9 FL (ref 9.2–12.9)
POTASSIUM SERPL-SCNC: 5.3 MMOL/L (ref 3.5–5.1)
PROT SERPL-MCNC: 7.6 G/DL (ref 6–8.4)
PROT UR-MCNC: 111 MG/DL (ref 0–15)
RBC # BLD AUTO: 3.62 M/UL (ref 4.6–6.2)
SODIUM SERPL-SCNC: 139 MMOL/L (ref 136–145)
WBC # BLD AUTO: 5.96 K/UL (ref 3.9–12.7)

## 2020-03-30 PROCEDURE — 84156 ASSAY OF PROTEIN URINE: CPT | Mod: NTX

## 2020-03-30 PROCEDURE — 84100 ASSAY OF PHOSPHORUS: CPT | Mod: NTX

## 2020-03-30 PROCEDURE — 85651 RBC SED RATE NONAUTOMATED: CPT | Mod: NTX

## 2020-03-30 PROCEDURE — 83735 ASSAY OF MAGNESIUM: CPT | Mod: NTX

## 2020-03-30 PROCEDURE — 63600175 PHARM REV CODE 636 W HCPCS: Mod: NTX | Performed by: NURSE PRACTITIONER

## 2020-03-30 PROCEDURE — 11000001 HC ACUTE MED/SURG PRIVATE ROOM: Mod: NTX

## 2020-03-30 PROCEDURE — 25000003 PHARM REV CODE 250: Mod: NTX | Performed by: NURSE PRACTITIONER

## 2020-03-30 PROCEDURE — 99285 EMERGENCY DEPT VISIT HI MDM: CPT | Mod: 25,NTX

## 2020-03-30 PROCEDURE — 85025 COMPLETE CBC W/AUTO DIFF WBC: CPT | Mod: NTX

## 2020-03-30 PROCEDURE — 80053 COMPREHEN METABOLIC PANEL: CPT | Mod: NTX

## 2020-03-30 PROCEDURE — 36415 COLL VENOUS BLD VENIPUNCTURE: CPT | Mod: NTX

## 2020-03-30 PROCEDURE — 93005 ELECTROCARDIOGRAM TRACING: CPT | Mod: NTX

## 2020-03-30 PROCEDURE — 86140 C-REACTIVE PROTEIN: CPT | Mod: NTX

## 2020-03-30 PROCEDURE — 82570 ASSAY OF URINE CREATININE: CPT | Mod: NTX

## 2020-03-30 PROCEDURE — 87205 SMEAR GRAM STAIN: CPT | Mod: NTX

## 2020-03-30 RX ORDER — GLUCAGON 1 MG
1 KIT INJECTION
Status: DISCONTINUED | OUTPATIENT
Start: 2020-03-30 | End: 2020-04-03 | Stop reason: HOSPADM

## 2020-03-30 RX ORDER — IBUPROFEN 200 MG
24 TABLET ORAL
Status: DISCONTINUED | OUTPATIENT
Start: 2020-03-30 | End: 2020-04-03 | Stop reason: HOSPADM

## 2020-03-30 RX ORDER — LABETALOL 200 MG/1
200 TABLET, FILM COATED ORAL EVERY 12 HOURS
Status: DISCONTINUED | OUTPATIENT
Start: 2020-03-30 | End: 2020-04-03 | Stop reason: HOSPADM

## 2020-03-30 RX ORDER — SODIUM,POTASSIUM PHOSPHATES 280-250MG
2 POWDER IN PACKET (EA) ORAL
Status: DISCONTINUED | OUTPATIENT
Start: 2020-03-30 | End: 2020-04-03 | Stop reason: HOSPADM

## 2020-03-30 RX ORDER — POTASSIUM CHLORIDE 20 MEQ/15ML
40 SOLUTION ORAL
Status: DISCONTINUED | OUTPATIENT
Start: 2020-03-30 | End: 2020-04-03 | Stop reason: HOSPADM

## 2020-03-30 RX ORDER — SODIUM BICARBONATE 650 MG/1
1300 TABLET ORAL 2 TIMES DAILY
Status: DISCONTINUED | OUTPATIENT
Start: 2020-03-30 | End: 2020-03-31

## 2020-03-30 RX ORDER — LANOLIN ALCOHOL/MO/W.PET/CERES
800 CREAM (GRAM) TOPICAL
Status: DISCONTINUED | OUTPATIENT
Start: 2020-03-30 | End: 2020-04-03 | Stop reason: HOSPADM

## 2020-03-30 RX ORDER — SODIUM CHLORIDE 0.9 % (FLUSH) 0.9 %
10 SYRINGE (ML) INJECTION
Status: DISCONTINUED | OUTPATIENT
Start: 2020-03-30 | End: 2020-04-03 | Stop reason: HOSPADM

## 2020-03-30 RX ORDER — IBUPROFEN 200 MG
16 TABLET ORAL
Status: DISCONTINUED | OUTPATIENT
Start: 2020-03-30 | End: 2020-04-03 | Stop reason: HOSPADM

## 2020-03-30 RX ORDER — HEPARIN SODIUM 5000 [USP'U]/ML
5000 INJECTION, SOLUTION INTRAVENOUS; SUBCUTANEOUS EVERY 8 HOURS
Status: DISCONTINUED | OUTPATIENT
Start: 2020-03-30 | End: 2020-04-03 | Stop reason: HOSPADM

## 2020-03-30 RX ORDER — ONDANSETRON 2 MG/ML
8 INJECTION INTRAMUSCULAR; INTRAVENOUS EVERY 8 HOURS PRN
Status: DISCONTINUED | OUTPATIENT
Start: 2020-03-30 | End: 2020-04-03 | Stop reason: HOSPADM

## 2020-03-30 RX ADMIN — LABETALOL HYDROCHLORIDE 200 MG: 200 TABLET, FILM COATED ORAL at 11:03

## 2020-03-30 RX ADMIN — HEPARIN SODIUM 5000 UNITS: 5000 INJECTION, SOLUTION INTRAVENOUS; SUBCUTANEOUS at 11:03

## 2020-03-30 RX ADMIN — SODIUM BICARBONATE 1300 MG: 650 TABLET ORAL at 11:03

## 2020-03-30 NOTE — SUBJECTIVE & OBJECTIVE
Past Medical History:   Diagnosis Date    CKD (chronic kidney disease), stage IV     due to hypertension    Hypertension     Secondary hyperparathyroidism     Vitamin D deficiency        Past Surgical History:   Procedure Laterality Date    AV FISTULA PLACEMENT Left 1/7/2020    Procedure: CREATION, AV FISTULA, brachiobasillic;  Surgeon: Derrek Grant MD;  Location: [address];  Service: Peripheral Vascular;  Laterality: Left;    FISTULOGRAM Left 2/4/2020    Procedure: Fistulogram;  Surgeon: Derrek Grant MD;  Location: [facility] OR [address] [place] [address];  Service: Peripheral Vascular;  Laterality: Left;  Fluro: 2.7 min  mGy: 23.74  contrast : 17ml    RENAL BIOPSY      TRANSPOSITION OF BASILIC VEIN Left 3/2/2020    Procedure: TRANSPOSITION, VEIN, BASILIC;  Surgeon: Derrek Grant MD;  Location: [address];  Service: Peripheral Vascular;  Laterality: Left;       Review of patient's allergies indicates:   Allergen Reactions    Lisinopril Swelling     Facial swelling       Current Facility-Administered Medications on File Prior to Encounter   Medication    0.9%  NaCl infusion    ceFAZolin injection 2 g    lidocaine (PF) 10 mg/ml (1%) injection 10 mg    mupirocin 2 % ointment    ondansetron injection 4 mg    sodium chloride 0.9% flush 10 mL     Current Outpatient Medications on File Prior to Encounter   Medication Sig    acetaminophen (TYLENOL) 325 MG tablet Take 325 mg by mouth every 6 (six) hours as needed for Pain. Takes 1-2 tablets prn    calcitRIOL (ROCALTROL) 0.5 MCG Cap Take 1 capsule (0.5 mcg total) by mouth once daily. (Patient taking differently: Take 0.5 mcg by mouth every morning. )    ergocalciferol (ERGOCALCIFEROL) 50,000 unit Cap Take 1 capsule (50,000 Units total) by mouth every 7 days. (Patient taking differently: Take 50,000 Units by mouth every 7 days. Takes on Saturdays)    furosemide (LASIX) 40 MG tablet Take 1 tablet (40 mg total) by mouth once daily. (Patient taking  differently: Take 40 mg by mouth every morning. )    labetalol (NORMODYNE) 200 MG tablet Take 200 mg by mouth every 12 (twelve) hours.    oxyCODONE-acetaminophen (PERCOCET) 5-325 mg per tablet Take 1 tablet by mouth every 6 (six) hours as needed for Pain.    sodium bicarbonate 650 MG tablet Take 2 tablets (1,300 mg total) by mouth 2 (two) times daily.     Family History     Problem Relation (Age of Onset)    Cancer Father    Diabetes Mother, Other    Heart disease Other    Hypertension Mother    Lung cancer Maternal Uncle        Tobacco Use    Smoking status: Never Smoker    Smokeless tobacco: Never Used   Substance and Sexual Activity    Alcohol use: No    Drug use: No    Sexual activity: Not on file     Review of Systems   Constitutional: Negative for appetite change, chills and diaphoresis.   HENT: Negative for congestion, hearing loss and sore throat.    Eyes: Negative for pain and itching.   Respiratory: Negative for cough, shortness of breath and wheezing.    Cardiovascular: Positive for leg swelling. Negative for chest pain and palpitations.   Gastrointestinal: Negative for abdominal pain, blood in stool and nausea.   Endocrine: Negative for polyphagia and polyuria.   Genitourinary: Positive for decreased urine volume. Negative for dysuria, flank pain and hematuria.   Musculoskeletal: Negative for arthralgias, back pain and myalgias.   Skin: Negative for color change, pallor and wound.   Neurological: Negative for dizziness and syncope. Light-headedness: ounter for a.   Hematological: Negative for adenopathy.   Psychiatric/Behavioral: Negative for agitation and confusion. The patient is not nervous/anxious.      Objective:     Vital Signs (Most Recent):  Temp: 98.6 °F (37 °C) (03/30/20 1658)  Pulse: 76 (03/30/20 1658)  Resp: 20 (03/30/20 1658)  BP: 132/79 (03/30/20 1658)  SpO2: 98 % (03/30/20 1658) Vital Signs (24h Range):  Temp:  [98.6 °F (37 °C)] 98.6 °F (37 °C)  Pulse:  [76] 76  Resp:  [20]  20  SpO2:  [98 %] 98 %  BP: (132)/(79) 132/79     Weight: 120.2 kg (265 lb)  Body mass index is 38.02 kg/m².    Physical Exam   Constitutional: He is oriented to person, place, and time. He appears well-developed and well-nourished.   HENT:   Head: Normocephalic and atraumatic.   Right Ear: External ear normal.   Left Ear: External ear normal.   Nose: Nose normal.   Mouth/Throat: Oropharynx is clear and moist.   Eyes: Pupils are equal, round, and reactive to light. Conjunctivae and EOM are normal.   Neck: Normal range of motion. Neck supple. No JVD present.   Cardiovascular: Normal rate, regular rhythm, normal heart sounds and intact distal pulses.   No murmur heard.  Trace pretibial edema   Pulmonary/Chest: Effort normal and breath sounds normal. He has no wheezes.   Abdominal: Soft. Bowel sounds are normal. There is no tenderness. There is no guarding.   Musculoskeletal: Normal range of motion.   Neurological: He is alert and oriented to person, place, and time. Coordination normal.   Skin: Skin is warm and dry.   AV fistula to left upper extremity with positive thrill and bruit   Psychiatric: He has a normal mood and affect. His behavior is normal.   Nursing note and vitals reviewed.        CRANIAL NERVES     CN III, IV, VI   Pupils are equal, round, and reactive to light.  Extraocular motions are normal.        Significant Labs:   BMP:   Recent Labs   Lab 03/30/20  1703         K 5.3*      CO2 19*   BUN 67*   CREATININE 9.1*   CALCIUM 9.0   MG 1.6     CBC:   Recent Labs   Lab 03/30/20  1703   WBC 5.96   HGB 9.9*   HCT 32.1*          Significant Imaging: I have reviewed and interpreted all pertinent imaging results/findings within the past 24 hours.

## 2020-03-30 NOTE — TELEPHONE ENCOUNTER
----- Message from Nicky Norris sent at 3/30/2020  4:38 PM CDT -----  Contact: pt's pamela   Pt is at the [facility]     Did not come to [facility]    So if doctor seent orders to ER  Please resend to [facility] COURT[facility] Wang  [phone]

## 2020-03-30 NOTE — TELEPHONE ENCOUNTER
Preferred Name:   Misael Owens  Male, 50 y.o., 1970  Phone:   [phone] (M)  PCP:   Katharina Ricci MD  Language:   English  Need Interp:   No  Allergies Last Reviewed:   03/02/20  Allergies:   Lisinopril  Health Maintenance:   Due  Primary Ins.:   [employer] CAROLYNN CALVO  MRN:   [id number]  Pt Comm Pref:   Patient Portal, Mail  Last MyChart Login:   3/28/2020 10:46 AM, Mobile  Next Appt:   With Nephrology (Matthew Moreno DO)  04/02/2020 at 11:00 AM  My Sticky Note:     Specialty Comments:     Patient Calls     You  Matthew Moreno DO Just now (2:54 PM)      Ok. Should I cx. his 4/2 apptm. with you?    Routing comment       Matthew Moreno DO routed conversation to Josh Castro Staff 18 minutes ago (2:35 PM)      Matthew Moreno DO 18 minutes ago (2:35 PM)         Spoke to patient and his fiance about his labs.  High potassium along with low GFR.  AV access not ready to be used yet as he had transposition about 3 weeks ago.  Asked him to go to the ED to get initiated on HD.  Pt c/o nausea and occ vomiting and some fatigue.  I emphasized the importance of getting treated for hyperkalemia and the risks without treatment.  I recommended that  he go to [facility] facility in [place] where he would like  to get long term HD so he can get initiated there and transferred to a HD unit there.           Documentation

## 2020-03-30 NOTE — ASSESSMENT & PLAN NOTE
Exacerbated by end-stage renal disease.  NPO pending possible hemodialysis catheter placement tonight.  Resume renal diet.  Trend potassium

## 2020-03-30 NOTE — ASSESSMENT & PLAN NOTE
Chronic, controlled.  Latest blood pressure and vitals reviewed-   Temp:  [98.6 °F (37 °C)]   Pulse:  [76]   Resp:  [20]   BP: (132)/(79)   SpO2:  [98 %] .   Home meds for hypertension were reviewed and noted below. Hospital anti-hypertensive changes were made as shown below.  Hypertension Medications             furosemide (LASIX) 40 MG tablet Take 1 tablet (40 mg total) by mouth once daily.    labetalol (NORMODYNE) 200 MG tablet Take 200 mg by mouth every 12 (twelve) hours.        Will utilize p.r.n. blood pressure medication only if patient's blood pressure greater than  180/110 and he develops symptoms such as worsening chest pain or shortness of breath.

## 2020-03-30 NOTE — TELEPHONE ENCOUNTER
Spoke to patient and his fiance about his labs.  High potassium along with low GFR.  AV access not ready to be used yet as he had transposition about 3 weeks ago.  Asked him to go to the ED to get initiated on HD.  Pt c/o nausea and occ vomiting and some fatigue.  I emphasized the importance of getting treated for hyperkalemia and the risks without treatment.  I recommended that  he go to [facility] facility in [place] where he would like  to get long term HD so he can get initiated there and transferred to a HD unit there.

## 2020-03-30 NOTE — ED PROVIDER NOTES
Encounter Date: 3/30/2020    SCRIBE #1 NOTE: Viviana MARTI, erika scribing for, and in the presence of, Braxton Hilton MD.       History     Chief Complaint   Patient presents with    abnormal labs       Time seen by provider: 5:08 PM on 03/30/2020    Misael Owens is a 50 y.o. male with PMHx of CKD and HTN who presents to the ED from his nephrologist to initiate dialysis. Patient had labs x3 days ago showing a potassium of 6.1 and creatinine of 9.5. Patient has an AV fistula that has not matured yet, is currently in the healing process. Patient had AV fistula surgery ~x2-3 month ago. Patient denies SOB, chest pain, fever, chills, or other new symptoms. He has no other medical concerns or complaints at this moment. SHx includes renal biopsy and AV fistula placement. Lisinopril allergy noted.    The history is provided by the patient.     Review of patient's allergies indicates:   Allergen Reactions    Lisinopril Swelling     Facial swelling     Past Medical History:   Diagnosis Date    CKD (chronic kidney disease), stage IV     due to hypertension    Hypertension     Secondary hyperparathyroidism     Vitamin D deficiency      Past Surgical History:   Procedure Laterality Date    AV FISTULA PLACEMENT Left 1/7/2020    Procedure: CREATION, AV FISTULA, brachiobasillic;  Surgeon: Derrek Grant MD;  Location: [facility] OR [address] [place] [address];  Service: Peripheral Vascular;  Laterality: Left;    FISTULOGRAM Left 2/4/2020    Procedure: Fistulogram;  Surgeon: Derrek Grant MD;  Location: [facility] OR [address] [place] [address];  Service: Peripheral Vascular;  Laterality: Left;  Fluro: 2.7 min  mGy: 23.74  contrast : 17ml    RENAL BIOPSY      TRANSPOSITION OF BASILIC VEIN Left 3/2/2020    Procedure: TRANSPOSITION, VEIN, BASILIC;  Surgeon: Derrek Grant MD;  Location: [facility] OR [address] [place] [address];  Service: Peripheral Vascular;  Laterality: Left;     Family History   Problem Relation Age of Onset    Hypertension Mother     Diabetes Mother      Cancer Father     Lung cancer Maternal Uncle     Heart disease Other     Diabetes Other     Stroke Neg Hx      Social History     Tobacco Use    Smoking status: Never Smoker    Smokeless tobacco: Never Used   Substance Use Topics    Alcohol use: No    Drug use: No     Review of Systems   Constitutional: Negative for activity change, appetite change, chills, fatigue and fever.   Eyes: Negative for visual disturbance.   Respiratory: Negative for apnea, cough and shortness of breath.    Cardiovascular: Negative for chest pain and palpitations.   Gastrointestinal: Negative for abdominal pain, nausea and vomiting.   Genitourinary: Negative for difficulty urinating and flank pain.   Musculoskeletal: Negative for gait problem and neck pain.   Skin: Negative for pallor and rash.   Neurological: Negative for weakness and headaches.   Hematological: Does not bruise/bleed easily.   Psychiatric/Behavioral: Negative for confusion.       Physical Exam     Initial Vitals [03/30/20 1658]   BP Pulse Resp Temp SpO2   132/79 76 20 98.6 °F (37 °C) 98 %      MAP       --         Physical Exam    Nursing note and vitals reviewed.  Constitutional: He appears well-developed and well-nourished.  Non-toxic appearance. No distress.   HENT:   Head: Normocephalic and atraumatic.   Eyes: EOM are normal. Pupils are equal, round, and reactive to light.   Neck: Normal range of motion. Neck supple. No neck rigidity. No JVD present.   Cardiovascular: Normal rate, regular rhythm, normal heart sounds and intact distal pulses. Exam reveals no gallop and no friction rub.    No murmur heard.  Pulmonary/Chest: Breath sounds normal. He has no wheezes. He has no rhonchi. He has no rales.   Abdominal: Soft. There is no tenderness. There is no rebound and no guarding.   Musculoskeletal: Normal range of motion.   Neurological: He is alert and oriented to person, place, and time. He has normal strength. No cranial nerve deficit. He exhibits normal muscle  tone. GCS eye subscore is 4. GCS verbal subscore is 5. GCS motor subscore is 6.   Skin: Skin is warm and dry.   AV fistula with palpable thrill to LUE.   Psychiatric: He has a normal mood and affect. His speech is normal and behavior is normal. He is not actively hallucinating.         ED Course   Procedures  Labs Reviewed   CBC W/ AUTO DIFFERENTIAL - Abnormal; Notable for the following components:       Result Value    RBC 3.62 (*)     Hemoglobin 9.9 (*)     Hematocrit 32.1 (*)     Mean Corpuscular Hemoglobin Conc 30.8 (*)     Eos # 0.6 (*)     Eosinophil% 10.4 (*)     All other components within normal limits   COMPREHENSIVE METABOLIC PANEL - Abnormal; Notable for the following components:    Potassium 5.3 (*)     CO2 19 (*)     BUN, Bld 67 (*)     Creatinine 9.1 (*)     AST 9 (*)     eGFR if  7 (*)     eGFR if non  6 (*)     All other components within normal limits   MAGNESIUM   PHOSPHORUS   C-REACTIVE PROTEIN   ALFARO'S STAIN, URINE RANDOM   PROTEIN, QUANTITATIVE, URINE RANDOM   CREATININE, URINE, RANDOM   SEDIMENTATION RATE   JENNY PROFILE I (SCREEN)     EKG Readings: (Independently Interpreted)   Initial Reading: No STEMI. Rhythm: Normal Sinus Rhythm. Heart Rate: 74 BPM. ST Segments: Normal ST Segments. T Waves: Normal. Axis: Normal.   Normal intervals.        Imaging Results    None          Medical Decision Making:   History:   Old Medical Records: I decided to obtain old medical records.  Initial Assessment:   50-year-old man with history of end-stage renal disease not yet on hemodialysis presents emergency department at the recommendation of his nephrologist to initiate hemodialysis.  Patient had labs drawn 3 days ago that showed worsening renal function and developing hyperkalemia.  Patient had nausea earlier but is now asymptomatic.  Hyperkalemia is improving with a potassium of 5.3 but still hypokalemic with a GFR of 7.  There are no concerning EKG changes.  Discussed with  Hospital Medicine who agrees to admit the patient for further evaluation treatment, possible initiation of hemodialysis.  Independently Interpreted Test(s):   I have ordered and independently interpreted EKG Reading(s) - see prior notes  Clinical Tests:   Lab Tests: Reviewed and Ordered  Medical Tests: Reviewed and Ordered  Other:   I have discussed this case with another health care provider.            Scribe Attestation:   Scribe #1: I performed the above scribed service and the documentation accurately describes the services I performed. I attest to the accuracy of the note.      I, Yobani Casey, personally performed the services described in this documentation. All medical record entries made by the scribe were at my direction and in my presence.  I have reviewed the chart and agree that the record reflects my personal performance and is accurate and complete. Braxton Hilton MD.                Clinical Impression:       ICD-10-CM ICD-9-CM   1. ESRD (end stage renal disease) N18.6 585.6   2. Hyperkalemia E87.5 276.7         Disposition:   Disposition: Admitted     ED Disposition Condition    Admit                           Braxton Hilton MD  03/30/20 1946

## 2020-03-30 NOTE — H&P
[facility] Medicine  History & Physical    Patient Name: Misael Owens  MRN: [id number]  Admission Date: 3/30/2020  Attending Physician: Katharina Louise NP  Primary Care Provider: Katharina Ricci MD         Patient information was obtained from patient and ER records.     Subjective:     Principal Problem:Hyperkalemia    Chief Complaint:   Chief Complaint   Patient presents with    abnormal labs        HPI: Misael Owens is a 50 y.o. male with PMH of chronic kidney disease stage 5, hypertension, morbid obesity and hyperparathyroidism, renal etiology who presents to ED for initiation of hemodialysis.  He had laboratory data which demonstrated a potassium of 6.1 and creatinine of 9.5.  Patient is status post AV fistula placement on 2/20/2020 and transposition, vein, basilic on 3/2/2020.  He denies shortness of breath chest pain fever or chills.     Past Medical History:   Diagnosis Date    CKD (chronic kidney disease), stage IV     due to hypertension    Hypertension     Secondary hyperparathyroidism     Vitamin D deficiency        Past Surgical History:   Procedure Laterality Date    AV FISTULA PLACEMENT Left 1/7/2020    Procedure: CREATION, AV FISTULA, brachiobasillic;  Surgeon: Derrek Grant MD;  Location: [address];  Service: Peripheral Vascular;  Laterality: Left;    FISTULOGRAM Left 2/4/2020    Procedure: Fistulogram;  Surgeon: Derrek Grant MD;  Location: [address];  Service: Peripheral Vascular;  Laterality: Left;  Fluro: 2.7 min  mGy: 23.74  contrast : 17ml    RENAL BIOPSY      TRANSPOSITION OF BASILIC VEIN Left 3/2/2020    Procedure: TRANSPOSITION, VEIN, BASILIC;  Surgeon: Derrek Grant MD;  Location: [address];  Service: Peripheral Vascular;  Laterality: Left;       Review of patient's allergies indicates:   Allergen Reactions    Lisinopril Swelling     Facial swelling       Current Facility-Administered Medications on File Prior  to Encounter   Medication    0.9%  NaCl infusion    ceFAZolin injection 2 g    lidocaine (PF) 10 mg/ml (1%) injection 10 mg    mupirocin 2 % ointment    ondansetron injection 4 mg    sodium chloride 0.9% flush 10 mL     Current Outpatient Medications on File Prior to Encounter   Medication Sig    acetaminophen (TYLENOL) 325 MG tablet Take 325 mg by mouth every 6 (six) hours as needed for Pain. Takes 1-2 tablets prn    calcitRIOL (ROCALTROL) 0.5 MCG Cap Take 1 capsule (0.5 mcg total) by mouth once daily. (Patient taking differently: Take 0.5 mcg by mouth every morning. )    ergocalciferol (ERGOCALCIFEROL) 50,000 unit Cap Take 1 capsule (50,000 Units total) by mouth every 7 days. (Patient taking differently: Take 50,000 Units by mouth every 7 days. Takes on Saturdays)    furosemide (LASIX) 40 MG tablet Take 1 tablet (40 mg total) by mouth once daily. (Patient taking differently: Take 40 mg by mouth every morning. )    labetalol (NORMODYNE) 200 MG tablet Take 200 mg by mouth every 12 (twelve) hours.    oxyCODONE-acetaminophen (PERCOCET) 5-325 mg per tablet Take 1 tablet by mouth every 6 (six) hours as needed for Pain.    sodium bicarbonate 650 MG tablet Take 2 tablets (1,300 mg total) by mouth 2 (two) times daily.     Family History     Problem Relation (Age of Onset)    Cancer Father    Diabetes Mother, Other    Heart disease Other    Hypertension Mother    Lung cancer Maternal Uncle        Tobacco Use    Smoking status: Never Smoker    Smokeless tobacco: Never Used   Substance and Sexual Activity    Alcohol use: No    Drug use: No    Sexual activity: Not on file     Review of Systems   Constitutional: Negative for appetite change, chills and diaphoresis.   HENT: Negative for congestion, hearing loss and sore throat.    Eyes: Negative for pain and itching.   Respiratory: Negative for cough, shortness of breath and wheezing.    Cardiovascular: Positive for leg swelling. Negative for chest pain and  palpitations.   Gastrointestinal: Negative for abdominal pain, blood in stool and nausea.   Endocrine: Negative for polyphagia and polyuria.   Genitourinary: Positive for decreased urine volume. Negative for dysuria, flank pain and hematuria.   Musculoskeletal: Negative for arthralgias, back pain and myalgias.   Skin: Negative for color change, pallor and wound.   Neurological: Negative for dizziness and syncope. Light-headedness: ounter for a.   Hematological: Negative for adenopathy.   Psychiatric/Behavioral: Negative for agitation and confusion. The patient is not nervous/anxious.      Objective:     Vital Signs (Most Recent):  Temp: 98.6 °F (37 °C) (03/30/20 1658)  Pulse: 76 (03/30/20 1658)  Resp: 20 (03/30/20 1658)  BP: 132/79 (03/30/20 1658)  SpO2: 98 % (03/30/20 1658) Vital Signs (24h Range):  Temp:  [98.6 °F (37 °C)] 98.6 °F (37 °C)  Pulse:  [76] 76  Resp:  [20] 20  SpO2:  [98 %] 98 %  BP: (132)/(79) 132/79     Weight: 120.2 kg (265 lb)  Body mass index is 38.02 kg/m².    Physical Exam   Constitutional: He is oriented to person, place, and time. He appears well-developed and well-nourished.   HENT:   Head: Normocephalic and atraumatic.   Right Ear: External ear normal.   Left Ear: External ear normal.   Nose: Nose normal.   Mouth/Throat: Oropharynx is clear and moist.   Eyes: Pupils are equal, round, and reactive to light. Conjunctivae and EOM are normal.   Neck: Normal range of motion. Neck supple. No JVD present.   Cardiovascular: Normal rate, regular rhythm, normal heart sounds and intact distal pulses.   No murmur heard.  Trace pretibial edema   Pulmonary/Chest: Effort normal and breath sounds normal. He has no wheezes.   Abdominal: Soft. Bowel sounds are normal. There is no tenderness. There is no guarding.   Musculoskeletal: Normal range of motion.   Neurological: He is alert and oriented to person, place, and time. Coordination normal.   Skin: Skin is warm and dry.   AV fistula to left upper extremity  with positive thrill and bruit   Psychiatric: He has a normal mood and affect. His behavior is normal.   Nursing note and vitals reviewed.        CRANIAL NERVES     CN III, IV, VI   Pupils are equal, round, and reactive to light.  Extraocular motions are normal.        Significant Labs:   BMP:   Recent Labs   Lab 03/30/20  1703         K 5.3*      CO2 19*   BUN 67*   CREATININE 9.1*   CALCIUM 9.0   MG 1.6     CBC:   Recent Labs   Lab 03/30/20  1703   WBC 5.96   HGB 9.9*   HCT 32.1*          Significant Imaging: I have reviewed and interpreted all pertinent imaging results/findings within the past 24 hours.    Assessment/Plan:     * Hyperkalemia  Exacerbated by end-stage renal disease.  NPO pending possible hemodialysis catheter placement tonight.  Resume renal diet.  Trend potassium    Metabolic acidosis  Renal in etiology.  Resume home sodium bicarb.    Class 2 severe obesity with serious comorbidity in adult  Body mass index is 38.02 kg/m².  General weight loss/lifestyle modification strategies discussed (elicit support from others; identify saboteurs; non-food rewards, etc).      Anemia of chronic kidney failure, stage 5  Patient's anemia is currently controlled. Has not received any PRBCs to date.. Etiology likely d/t renal disease  Current CBC reviewed-   Lab Results   Component Value Date    HGB 9.9 (L) 03/30/2020    HCT 32.1 (L) 03/30/2020     Monitor serial CBC and transfuse if patient becomes hemodynamically unstable, symptomatic or H/H drops below 7/21.         Hypertensive renal disease, malignant, stage 5 chronic kidney disease or end stage renal disease  Chronic, controlled.  Latest blood pressure and vitals reviewed-   Temp:  [98.6 °F (37 °C)]   Pulse:  [76]   Resp:  [20]   BP: (132)/(79)   SpO2:  [98 %] .   Home meds for hypertension were reviewed and noted below. Hospital anti-hypertensive changes were made as shown below.  Hypertension Medications             furosemide  (LASIX) 40 MG tablet Take 1 tablet (40 mg total) by mouth once daily.    labetalol (NORMODYNE) 200 MG tablet Take 200 mg by mouth every 12 (twelve) hours.        Will utilize p.r.n. blood pressure medication only if patient's blood pressure greater than  180/110 and he develops symptoms such as worsening chest pain or shortness of breath.        Secondary hyperparathyroidism  Chronic resume home vitamin-D analogs.  Defer to Nephrology.        VTE Risk Mitigation (From admission, onward)    None             Katharina Louise NP  Department of Hospital Medicine   [facility]

## 2020-03-30 NOTE — ASSESSMENT & PLAN NOTE
Body mass index is 38.02 kg/m².  General weight loss/lifestyle modification strategies discussed (elicit support from others; identify saboteurs; non-food rewards, etc).

## 2020-03-30 NOTE — TELEPHONE ENCOUNTER
Preferred Name:   Misael Owens  Male, 50 y.o., 1970  Phone:   [phone] (M)  PCP:   Katharina Ricci MD  Language:   English  Need Interp:   No  Allergies Last Reviewed:   03/02/20  Allergies:   Lisinopril  Health Maintenance:   Due  Primary Ins.:   [employer]VO  MRN:   [id number]  Pt Comm Pref:   Patient Portal, Mail  Last MyChart Login:   3/28/2020 10:46 AM, Mobile  Next Appt:   With Nephrology (Matthew Moreno DO)  04/02/2020 at 11:00 AM  My Sticky Note:     Specialty Comments:     Patient Calls     You  Matthew Moreno DO Just now (3:36 PM)      Thank you.    Routing comment       Matthew Moreno DO routed conversation to Josh Castro Staff 10 minutes ago (3:25 PM)      Matthew Moreno DO 10 minutes ago (3:25 PM)         Please check with him on 4/1 to get an update before we cancel.         Documentation

## 2020-03-30 NOTE — ASSESSMENT & PLAN NOTE
Patient's anemia is currently controlled. Has not received any PRBCs to date.. Etiology likely d/t renal disease  Current CBC reviewed-   Lab Results   Component Value Date    HGB 9.9 (L) 03/30/2020    HCT 32.1 (L) 03/30/2020     Monitor serial CBC and transfuse if patient becomes hemodynamically unstable, symptomatic or H/H drops below 7/21.

## 2020-03-31 ENCOUNTER — ANESTHESIA (OUTPATIENT)
Dept: SURGERY | Facility: HOSPITAL | Age: 50
DRG: 673 | End: 2020-03-31
Payer: MEDICAID

## 2020-03-31 ENCOUNTER — ANESTHESIA EVENT (OUTPATIENT)
Dept: SURGERY | Facility: HOSPITAL | Age: 50
DRG: 673 | End: 2020-03-31
Payer: MEDICAID

## 2020-03-31 LAB
ANION GAP SERPL CALC-SCNC: 10 MMOL/L (ref 8–16)
BASOPHILS # BLD AUTO: 0.06 K/UL (ref 0–0.2)
BASOPHILS NFR BLD: 1 % (ref 0–1.9)
BUN SERPL-MCNC: 70 MG/DL (ref 6–20)
CALCIUM SERPL-MCNC: 8.8 MG/DL (ref 8.7–10.5)
CHLORIDE SERPL-SCNC: 111 MMOL/L (ref 95–110)
CO2 SERPL-SCNC: 19 MMOL/L (ref 23–29)
CREAT SERPL-MCNC: 9.1 MG/DL (ref 0.5–1.4)
DIFFERENTIAL METHOD: ABNORMAL
EOSINOPHIL # BLD AUTO: 0.6 K/UL (ref 0–0.5)
EOSINOPHIL NFR BLD: 10.2 % (ref 0–8)
EOSINOPHIL URNS QL WRIGHT STN: NORMAL
ERYTHROCYTE [DISTWIDTH] IN BLOOD BY AUTOMATED COUNT: 14.3 % (ref 11.5–14.5)
EST. GFR  (AFRICAN AMERICAN): 7 ML/MIN/1.73 M^2
EST. GFR  (NON AFRICAN AMERICAN): 6 ML/MIN/1.73 M^2
GLUCOSE SERPL-MCNC: 86 MG/DL (ref 70–110)
HCT VFR BLD AUTO: 32.6 % (ref 40–54)
HGB BLD-MCNC: 10.2 G/DL (ref 14–18)
IMM GRANULOCYTES # BLD AUTO: 0.02 K/UL (ref 0–0.04)
IMM GRANULOCYTES NFR BLD AUTO: 0.3 % (ref 0–0.5)
LYMPHOCYTES # BLD AUTO: 1.8 K/UL (ref 1–4.8)
LYMPHOCYTES NFR BLD: 28.9 % (ref 18–48)
MCH RBC QN AUTO: 28.1 PG (ref 27–31)
MCHC RBC AUTO-ENTMCNC: 31.3 G/DL (ref 32–36)
MCV RBC AUTO: 90 FL (ref 82–98)
MONOCYTES # BLD AUTO: 0.5 K/UL (ref 0.3–1)
MONOCYTES NFR BLD: 8.2 % (ref 4–15)
NEUTROPHILS # BLD AUTO: 3.2 K/UL (ref 1.8–7.7)
NEUTROPHILS NFR BLD: 51.4 % (ref 38–73)
NRBC BLD-RTO: 0 /100 WBC
PLATELET # BLD AUTO: 214 K/UL (ref 150–350)
PMV BLD AUTO: 9.6 FL (ref 9.2–12.9)
POTASSIUM SERPL-SCNC: 5.2 MMOL/L (ref 3.5–5.1)
RBC # BLD AUTO: 3.63 M/UL (ref 4.6–6.2)
SODIUM SERPL-SCNC: 140 MMOL/L (ref 136–145)
WBC # BLD AUTO: 6.2 K/UL (ref 3.9–12.7)

## 2020-03-31 PROCEDURE — 80048 BASIC METABOLIC PNL TOTAL CA: CPT | Mod: NTX

## 2020-03-31 PROCEDURE — 63600175 PHARM REV CODE 636 W HCPCS: Mod: NTX | Performed by: ANESTHESIOLOGY

## 2020-03-31 PROCEDURE — 36000707: Mod: NTX | Performed by: THORACIC SURGERY (CARDIOTHORACIC VASCULAR SURGERY)

## 2020-03-31 PROCEDURE — 36415 COLL VENOUS BLD VENIPUNCTURE: CPT | Mod: NTX

## 2020-03-31 PROCEDURE — 63600175 PHARM REV CODE 636 W HCPCS: Mod: NTX | Performed by: THORACIC SURGERY (CARDIOTHORACIC VASCULAR SURGERY)

## 2020-03-31 PROCEDURE — 85025 COMPLETE CBC W/AUTO DIFF WBC: CPT | Mod: NTX

## 2020-03-31 PROCEDURE — D9220A PRA ANESTHESIA: ICD-10-PCS | Mod: ANES,NTX,, | Performed by: ANESTHESIOLOGY

## 2020-03-31 PROCEDURE — 86038 ANTINUCLEAR ANTIBODIES: CPT | Mod: NTX

## 2020-03-31 PROCEDURE — 71000039 HC RECOVERY, EACH ADD'L HOUR: Mod: NTX | Performed by: THORACIC SURGERY (CARDIOTHORACIC VASCULAR SURGERY)

## 2020-03-31 PROCEDURE — 99900104 DSU ONLY-NO CHARGE-EA ADD'L HR (STAT): Mod: NTX | Performed by: THORACIC SURGERY (CARDIOTHORACIC VASCULAR SURGERY)

## 2020-03-31 PROCEDURE — 37000009 HC ANESTHESIA EA ADD 15 MINS: Mod: NTX | Performed by: THORACIC SURGERY (CARDIOTHORACIC VASCULAR SURGERY)

## 2020-03-31 PROCEDURE — 11000001 HC ACUTE MED/SURG PRIVATE ROOM: Mod: NTX

## 2020-03-31 PROCEDURE — D9220A PRA ANESTHESIA: Mod: ANES,NTX,, | Performed by: ANESTHESIOLOGY

## 2020-03-31 PROCEDURE — D9220A PRA ANESTHESIA: ICD-10-PCS | Mod: CRNA,NTX,, | Performed by: REGISTERED NURSE

## 2020-03-31 PROCEDURE — 25000003 PHARM REV CODE 250: Mod: NTX | Performed by: REGISTERED NURSE

## 2020-03-31 PROCEDURE — 36000706: Mod: NTX | Performed by: THORACIC SURGERY (CARDIOTHORACIC VASCULAR SURGERY)

## 2020-03-31 PROCEDURE — 99900103 DSU ONLY-NO CHARGE-INITIAL HR (STAT): Mod: NTX | Performed by: THORACIC SURGERY (CARDIOTHORACIC VASCULAR SURGERY)

## 2020-03-31 PROCEDURE — 71000033 HC RECOVERY, INTIAL HOUR: Mod: NTX | Performed by: THORACIC SURGERY (CARDIOTHORACIC VASCULAR SURGERY)

## 2020-03-31 PROCEDURE — 37000008 HC ANESTHESIA 1ST 15 MINUTES: Mod: NTX | Performed by: THORACIC SURGERY (CARDIOTHORACIC VASCULAR SURGERY)

## 2020-03-31 PROCEDURE — D9220A PRA ANESTHESIA: Mod: CRNA,NTX,, | Performed by: REGISTERED NURSE

## 2020-03-31 PROCEDURE — 63600175 PHARM REV CODE 636 W HCPCS: Mod: NTX | Performed by: NURSE PRACTITIONER

## 2020-03-31 PROCEDURE — 25000003 PHARM REV CODE 250: Mod: NTX | Performed by: NURSE PRACTITIONER

## 2020-03-31 PROCEDURE — 90935 HEMODIALYSIS ONE EVALUATION: CPT | Mod: NTX

## 2020-03-31 PROCEDURE — C1750 CATH, HEMODIALYSIS,LONG-TERM: HCPCS | Mod: NTX | Performed by: THORACIC SURGERY (CARDIOTHORACIC VASCULAR SURGERY)

## 2020-03-31 PROCEDURE — 63600175 PHARM REV CODE 636 W HCPCS: Mod: NTX | Performed by: REGISTERED NURSE

## 2020-03-31 PROCEDURE — 25000003 PHARM REV CODE 250: Mod: NTX | Performed by: THORACIC SURGERY (CARDIOTHORACIC VASCULAR SURGERY)

## 2020-03-31 DEVICE — KIT CATH HEMOSPLIT 14FR 23CM: Type: IMPLANTABLE DEVICE | Site: CHEST | Status: FUNCTIONAL

## 2020-03-31 RX ORDER — PROPOFOL 10 MG/ML
VIAL (ML) INTRAVENOUS CONTINUOUS PRN
Status: DISCONTINUED | OUTPATIENT
Start: 2020-03-31 | End: 2020-03-31

## 2020-03-31 RX ORDER — HEPARIN SODIUM 5000 [USP'U]/ML
INJECTION, SOLUTION INTRAVENOUS; SUBCUTANEOUS
Status: DISCONTINUED | OUTPATIENT
Start: 2020-03-31 | End: 2020-03-31 | Stop reason: HOSPADM

## 2020-03-31 RX ORDER — OXYCODONE HYDROCHLORIDE 5 MG/1
5 TABLET ORAL ONCE AS NEEDED
Status: DISCONTINUED | OUTPATIENT
Start: 2020-03-31 | End: 2020-03-31 | Stop reason: HOSPADM

## 2020-03-31 RX ORDER — FENTANYL CITRATE 50 UG/ML
25 INJECTION, SOLUTION INTRAMUSCULAR; INTRAVENOUS EVERY 5 MIN PRN
Status: COMPLETED | OUTPATIENT
Start: 2020-03-31 | End: 2020-03-31

## 2020-03-31 RX ORDER — PROPOFOL 10 MG/ML
VIAL (ML) INTRAVENOUS
Status: DISCONTINUED | OUTPATIENT
Start: 2020-03-31 | End: 2020-03-31

## 2020-03-31 RX ORDER — CEFAZOLIN SODIUM 2 G/50ML
2 SOLUTION INTRAVENOUS ONCE
Status: COMPLETED | OUTPATIENT
Start: 2020-03-31 | End: 2020-03-31

## 2020-03-31 RX ORDER — KETAMINE HYDROCHLORIDE 100 MG/ML
INJECTION, SOLUTION INTRAMUSCULAR; INTRAVENOUS
Status: DISCONTINUED | OUTPATIENT
Start: 2020-03-31 | End: 2020-03-31

## 2020-03-31 RX ORDER — ONDANSETRON 2 MG/ML
INJECTION INTRAMUSCULAR; INTRAVENOUS
Status: DISCONTINUED | OUTPATIENT
Start: 2020-03-31 | End: 2020-03-31

## 2020-03-31 RX ORDER — FENTANYL CITRATE 50 UG/ML
INJECTION, SOLUTION INTRAMUSCULAR; INTRAVENOUS
Status: DISCONTINUED | OUTPATIENT
Start: 2020-03-31 | End: 2020-03-31

## 2020-03-31 RX ORDER — ONDANSETRON 2 MG/ML
4 INJECTION INTRAMUSCULAR; INTRAVENOUS ONCE AS NEEDED
Status: DISCONTINUED | OUTPATIENT
Start: 2020-03-31 | End: 2020-03-31 | Stop reason: HOSPADM

## 2020-03-31 RX ORDER — CEFAZOLIN SODIUM 2 G/50ML
SOLUTION INTRAVENOUS
Status: COMPLETED
Start: 2020-03-31 | End: 2020-03-31

## 2020-03-31 RX ORDER — GLYCOPYRROLATE 0.2 MG/ML
INJECTION INTRAMUSCULAR; INTRAVENOUS
Status: DISCONTINUED | OUTPATIENT
Start: 2020-03-31 | End: 2020-03-31

## 2020-03-31 RX ORDER — MIDAZOLAM HYDROCHLORIDE 1 MG/ML
INJECTION, SOLUTION INTRAMUSCULAR; INTRAVENOUS
Status: DISCONTINUED | OUTPATIENT
Start: 2020-03-31 | End: 2020-03-31

## 2020-03-31 RX ADMIN — FENTANYL CITRATE 25 MCG: 50 INJECTION INTRAMUSCULAR; INTRAVENOUS at 03:03

## 2020-03-31 RX ADMIN — PROPOFOL 20 MG: 10 INJECTION, EMULSION INTRAVENOUS at 02:03

## 2020-03-31 RX ADMIN — PROPOFOL 50 MCG/KG/MIN: 10 INJECTION, EMULSION INTRAVENOUS at 02:03

## 2020-03-31 RX ADMIN — SODIUM BICARBONATE 1300 MG: 650 TABLET ORAL at 10:03

## 2020-03-31 RX ADMIN — KETAMINE HYDROCHLORIDE 20 MG: 100 INJECTION, SOLUTION, CONCENTRATE INTRAMUSCULAR; INTRAVENOUS at 02:03

## 2020-03-31 RX ADMIN — FENTANYL CITRATE 75 MCG: 50 INJECTION, SOLUTION INTRAMUSCULAR; INTRAVENOUS at 02:03

## 2020-03-31 RX ADMIN — LABETALOL HYDROCHLORIDE 200 MG: 200 TABLET, FILM COATED ORAL at 09:03

## 2020-03-31 RX ADMIN — MIDAZOLAM 2 MG: 1 INJECTION INTRAMUSCULAR; INTRAVENOUS at 02:03

## 2020-03-31 RX ADMIN — CEFAZOLIN SODIUM 2 G: 2 SOLUTION INTRAVENOUS at 02:03

## 2020-03-31 RX ADMIN — HEPARIN SODIUM 5000 UNITS: 5000 INJECTION, SOLUTION INTRAVENOUS; SUBCUTANEOUS at 09:03

## 2020-03-31 RX ADMIN — GLYCOPYRROLATE 0.2 MG: 0.2 INJECTION, SOLUTION INTRAMUSCULAR; INTRAVENOUS at 02:03

## 2020-03-31 RX ADMIN — ONDANSETRON 4 MG: 2 INJECTION, SOLUTION INTRAMUSCULAR; INTRAVENOUS at 02:03

## 2020-03-31 RX ADMIN — HEPARIN SODIUM 5000 UNITS: 5000 INJECTION, SOLUTION INTRAVENOUS; SUBCUTANEOUS at 05:03

## 2020-03-31 RX ADMIN — LABETALOL HYDROCHLORIDE 200 MG: 200 TABLET, FILM COATED ORAL at 10:03

## 2020-03-31 NOTE — PLAN OF CARE
CM completed discharge assessment over the phone with pt. Pt verified information on facesheet as correct. Pt denies POA/LW. Pt reports living at listed address with his SO, Kim. PCP is Dr. Ricci. Pharm is [facility] on [address]. Pt denies hh/hd/dme. Pt is not currently doing dialysis but now requires. States he is okay with any facility in [place]. Reports being independent with ADLs and able to drive himself to apts. Reports that Kim will provide transportation home upon DC. DC plan is home with new outpt dialysis. CM following.        03/31/20 1015   Discharge Assessment   Assessment Type Discharge Planning Assessment   Confirmed/corrected address and phone number on facesheet? Yes   Assessment information obtained from? Patient   Communicated expected length of stay with patient/caregiver yes   Prior to hospitilization cognitive status: Alert/Oriented   Prior to hospitalization functional status: Independent   Current cognitive status: Alert/Oriented   Current Functional Status: Independent   Lives With significant other   Able to Return to Prior Arrangements yes   Is patient able to care for self after discharge? Yes   Patient's perception of discharge disposition home or selfcare   Readmission Within the Last 30 Days no previous admission in last 30 days   Patient currently being followed by outpatient case management? No   Patient currently receives any other outside agency services? No   Equipment Currently Used at Home none   Do you have any problems affording any of your prescribed medications? No   Is the patient taking medications as prescribed? yes   Does the patient have transportation home? Yes   Transportation Anticipated family or friend will provide   Does the patient receive services at the [facility]? No   Discharge Plan A Home   DME Needed Upon Discharge  none   Patient/Family in Agreement with Plan yes

## 2020-03-31 NOTE — TRANSFER OF CARE
"Anesthesia Transfer of Care Note    Patient: Misael Owens    Procedure(s) Performed: Procedure(s):  INSERTION, CATHETER, DIALYSIS    Patient location: PACU    Anesthesia Type: general    Transport from OR: Transported from OR on 6-10 L/min O2 by face mask with adequate spontaneous ventilation    Post pain: adequate analgesia    Post assessment: tolerated procedure well and no apparent anesthetic complications    Post vital signs: stable    Level of consciousness: sedated    Nausea/Vomiting: no nausea/vomiting    Complications: none    Transfer of care protocol was followed      Last vitals:   Visit Vitals  /74 (BP Location: Right leg)   Pulse 93   Temp 36.6 °C (97.9 °F) (Skin)   Resp (!) 24   Ht 5' 11" (1.803 m)   Wt 119.7 kg (264 lb)   SpO2 99%   BMI 36.82 kg/m²     "

## 2020-03-31 NOTE — PLAN OF CARE
CM sent referral for outpt dialysis to [facility] and [facility] Admissions. CM sent available information for review (facesheet with insurance info, h&p, CXR, labs, MAR) - hep b panel has not collected/resulted and no dialysis flowsheet available at this time. CM following.      03/31/20 1534   Post-Acute Status   Post-Acute Authorization Dialysis   Diaylsis Status Referrals Sent

## 2020-03-31 NOTE — PLAN OF CARE
Patient AAOX4 resting in bed, appears asleep, eyes closed, respirations even and unlabored. NAD. Denies pain or SOB. VSS. Afebrile. Up with standby assistance. Room air. Medications administered per MD order. Bed alarm active. Side Rails up X2. Plan of care reviewed with patient. Verbalizes understanding. Frequent checks performed Q2H.Call light in reach. Pt free from fall or injury. Will monitor.

## 2020-03-31 NOTE — SUBJECTIVE & OBJECTIVE
Interval History: no new issues. Feeling fine. NPO for bharati split today. Denies dypsnea    Review of Systems   Constitutional: Negative for appetite change and diaphoresis.   HENT: Negative for congestion and sore throat.    Respiratory: Negative for cough, shortness of breath and wheezing.    Cardiovascular: Negative for chest pain, palpitations and leg swelling.   Gastrointestinal: Negative for abdominal pain, blood in stool and nausea.   Endocrine: Negative for polyuria.   Genitourinary: Negative for dysuria, flank pain and hematuria.   Musculoskeletal: Negative for arthralgias, back pain and myalgias.   Neurological: Negative for dizziness, syncope and light-headedness.   Psychiatric/Behavioral: Negative for agitation and confusion. The patient is not nervous/anxious.      Objective:     Vital Signs (Most Recent):  Temp: 98 °F (36.7 °C) (03/31/20 1223)  Pulse: 72 (03/31/20 1223)  Resp: 16 (03/31/20 1223)  BP: (!) 164/74 (03/31/20 1226)  SpO2: 95 % (03/31/20 1223) Vital Signs (24h Range):  Temp:  [97.2 °F (36.2 °C)-98.6 °F (37 °C)] 98 °F (36.7 °C)  Pulse:  [69-79] 72  Resp:  [16-20] 16  SpO2:  [93 %-98 %] 95 %  BP: (112-164)/(57-94) 164/74     Weight: 119.7 kg (264 lb)  Body mass index is 36.82 kg/m².  No intake or output data in the 24 hours ending 03/31/20 1307   Physical Exam   Constitutional: He is oriented to person, place, and time. He appears well-developed and well-nourished.   HENT:   Head: Normocephalic and atraumatic.   Nose: Nose normal.   Mouth/Throat: Oropharynx is clear and moist.   Eyes: Pupils are equal, round, and reactive to light. Conjunctivae and EOM are normal.   Neck: Normal range of motion. Neck supple.   Cardiovascular: Normal rate, regular rhythm, normal heart sounds and intact distal pulses.   Pulmonary/Chest: Effort normal and breath sounds normal. He has no wheezes.   Abdominal: Soft. Bowel sounds are normal. There is no tenderness.   Musculoskeletal: Normal range of motion.    Neurological: He is alert and oriented to person, place, and time.   Skin: Skin is warm and dry.   AVF to RUE + Thrill and bruit   Psychiatric: He has a normal mood and affect. His behavior is normal.   Nursing note and vitals reviewed.      Significant Labs:   BMP:   Recent Labs   Lab 03/30/20  1703 03/31/20  0506    86    140   K 5.3* 5.2*    111*   CO2 19* 19*   BUN 67* 70*   CREATININE 9.1* 9.1*   CALCIUM 9.0 8.8   MG 1.6  --      CBC:   Recent Labs   Lab 03/30/20  1703 03/31/20  0506   WBC 5.96 6.20   HGB 9.9* 10.2*   HCT 32.1* 32.6*    214       Significant Imaging: I have reviewed and interpreted all pertinent imaging results/findings within the past 24 hours.

## 2020-03-31 NOTE — PROGRESS NOTES
[facility]  Progress Note    Patient Name: Misael Owens  MRN: [id number]  Patient Class: IP- Inpatient   Admission Date: 3/30/2020  Length of Stay: 1 days  Attending Physician: Crystal Palacio MD  Primary Care Provider: Katharina Ricci MD        Subjective:     Principal Problem:Hyperkalemia        HPI:  Misael Owens is a 50 y.o. male with PMH of chronic kidney disease stage 5, hypertension, morbid obesity and hyperparathyroidism, renal etiology who presents to ED for initiation of hemodialysis.  He had laboratory data which demonstrated a potassium of 6.1 and creatinine of 9.5.  Patient is status post AV fistula placement on 2/20/2020 and transposition, vein, basilic on 3/2/2020.  He denies shortness of breath chest pain fever or chills.     Overview/Hospital Course:  Patient monitor closely during hospitalization.  He was noted to have worsening of renal function and hyperkalemia upon admit.  He has been admitted for initiation of hemodialysis.  Nephrology and vascular surgery consulted.  He underwent placement for HemoSplit on 03/31/2020.  Hepatitis panel pending.    Interval History: no new issues. Feeling fine. NPO for bharati split today. Denies dypsnea    Review of Systems   Constitutional: Negative for appetite change and diaphoresis.   HENT: Negative for congestion and sore throat.    Respiratory: Negative for cough, shortness of breath and wheezing.    Cardiovascular: Negative for chest pain, palpitations and leg swelling.   Gastrointestinal: Negative for abdominal pain, blood in stool and nausea.   Endocrine: Negative for polyuria.   Genitourinary: Negative for dysuria, flank pain and hematuria.   Musculoskeletal: Negative for arthralgias, back pain and myalgias.   Neurological: Negative for dizziness, syncope and light-headedness.   Psychiatric/Behavioral: Negative for agitation and confusion. The patient is not nervous/anxious.      Objective:     Vital Signs (Most  Recent):  Temp: 98 °F (36.7 °C) (03/31/20 1223)  Pulse: 72 (03/31/20 1223)  Resp: 16 (03/31/20 1223)  BP: (!) 164/74 (03/31/20 1226)  SpO2: 95 % (03/31/20 1223) Vital Signs (24h Range):  Temp:  [97.2 °F (36.2 °C)-98.6 °F (37 °C)] 98 °F (36.7 °C)  Pulse:  [69-79] 72  Resp:  [16-20] 16  SpO2:  [93 %-98 %] 95 %  BP: (112-164)/(57-94) 164/74     Weight: 119.7 kg (264 lb)  Body mass index is 36.82 kg/m².  No intake or output data in the 24 hours ending 03/31/20 1307   Physical Exam   Constitutional: He is oriented to person, place, and time. He appears well-developed and well-nourished.   HENT:   Head: Normocephalic and atraumatic.   Nose: Nose normal.   Mouth/Throat: Oropharynx is clear and moist.   Eyes: Pupils are equal, round, and reactive to light. Conjunctivae and EOM are normal.   Neck: Normal range of motion. Neck supple.   Cardiovascular: Normal rate, regular rhythm, normal heart sounds and intact distal pulses.   Pulmonary/Chest: Effort normal and breath sounds normal. He has no wheezes.   Abdominal: Soft. Bowel sounds are normal. There is no tenderness.   Musculoskeletal: Normal range of motion.   Neurological: He is alert and oriented to person, place, and time.   Skin: Skin is warm and dry.   AVF to RUE + Thrill and bruit   Psychiatric: He has a normal mood and affect. His behavior is normal.   Nursing note and vitals reviewed.      Significant Labs:   BMP:   Recent Labs   Lab 03/30/20  1703 03/31/20  0506    86    140   K 5.3* 5.2*    111*   CO2 19* 19*   BUN 67* 70*   CREATININE 9.1* 9.1*   CALCIUM 9.0 8.8   MG 1.6  --      CBC:   Recent Labs   Lab 03/30/20  1703 03/31/20  0506   WBC 5.96 6.20   HGB 9.9* 10.2*   HCT 32.1* 32.6*    214       Significant Imaging: I have reviewed and interpreted all pertinent imaging results/findings within the past 24 hours.      Assessment/Plan:      * Hyperkalemia  Exacerbated by end-stage renal disease.  NPO pending possible hemodialysis catheter  placement tonight.  Resume renal diet.  Trend potassium  Lab Results   Component Value Date    K 5.2 (H) 03/31/2020         Metabolic acidosis  Renal in etiology.  Resume home sodium bicarb.    Class 2 severe obesity with serious comorbidity in adult  Body mass index is 38.02 kg/m².  General weight loss/lifestyle modification strategies discussed (elicit support from others; identify saboteurs; non-food rewards, etc).      Anemia of chronic kidney failure, stage 5  Patient's anemia is currently controlled. Has not received any PRBCs to date.. Etiology likely d/t renal disease  Current CBC reviewed-   Lab Results   Component Value Date    HGB 9.9 (L) 03/30/2020    HCT 32.1 (L) 03/30/2020     Monitor serial CBC and transfuse if patient becomes hemodynamically unstable, symptomatic or H/H drops below 7/21.         Hypertensive renal disease, malignant, stage 5 chronic kidney disease or end stage renal disease  Chronic, controlled.  Latest blood pressure and vitals reviewed-   Temp:  [98.6 °F (37 °C)]   Pulse:  [76]   Resp:  [20]   BP: (132)/(79)   SpO2:  [98 %] .   Home meds for hypertension were reviewed and noted below. Hospital anti-hypertensive changes were made as shown below.  Hypertension Medications             furosemide (LASIX) 40 MG tablet Take 1 tablet (40 mg total) by mouth once daily.    labetalol (NORMODYNE) 200 MG tablet Take 200 mg by mouth every 12 (twelve) hours.        Will utilize p.r.n. blood pressure medication only if patient's blood pressure greater than  180/110 and he develops symptoms such as worsening chest pain or shortness of breath.        ESRD (end stage renal disease)        Secondary hyperparathyroidism  Chronic resume home vitamin-D analogs.  Defer to Nephrology.        VTE Risk Mitigation (From admission, onward)         Ordered     heparin (porcine) injection 5,000 Units  Every 8 hours      03/30/20 2241     IP VTE HIGH RISK PATIENT  Once      03/30/20 2241                       Katharina Louise NP  Department of Hospital Medicine   [facility]

## 2020-03-31 NOTE — ANESTHESIA PREPROCEDURE EVALUATION
03/31/2020  Misael Owens is a 50 y.o., male.    Anesthesia Evaluation    I have reviewed the Patient Summary Reports.    I have reviewed the Nursing Notes.   I have reviewed the Medications.     Review of Systems  Anesthesia Hx:  No problems with previous Anesthesia    Social:  Non-Smoker, No Alcohol Use    Hematology/Oncology:     Oncology Normal    -- Anemia:   EENT/Dental:EENT/Dental Normal   Cardiovascular:   Hypertension    Pulmonary:  Pulmonary Normal    Renal/:   Chronic Renal Disease, ESRD    Musculoskeletal:  Musculoskeletal Normal    Neurological:  Neurology Normal    Endocrine:  Endocrine Normal    Dermatological:  Skin Normal    Psych:  Psychiatric Normal           Physical Exam  General:  Obesity    Airway/Jaw/Neck:  Airway Findings: Mouth Opening: Normal Tongue: Normal  General Airway Assessment: Adult  Mallampati: II     Eyes/Ears/Nose:  EYES/EARS/NOSE FINDINGS: Normal   Dental:  Dental Findings: In tact   Chest/Lungs:  Chest/Lungs Findings: Clear to auscultation, Normal Respiratory Rate     Heart/Vascular:  Heart Findings: Rate: Normal  Rhythm: Regular Rhythm  Vascular Findings:  Dialysis Access: AV Fistula LUE        Mental Status:  Mental Status Findings:  Cooperative, Alert and Oriented         Anesthesia Plan  Type of Anesthesia, risks & benefits discussed:  Anesthesia Type:  MAC  Patient's Preference:   Intra-op Monitoring Plan: standard ASA monitors  Intra-op Monitoring Plan Comments:   Post Op Pain Control Plan: multimodal analgesia and IV/PO Opioids PRN  Post Op Pain Control Plan Comments:   Induction:   IV  Beta Blocker:  Patient is on a Beta-Blocker and has received one dose within the past 24 hours (No further documentation required).       Informed Consent: Patient understands risks and agrees with Anesthesia plan.  Questions answered. Anesthesia consent signed with patient.  ASA  Score: 3     Day of Surgery Review of History & Physical: I have interviewed and examined the patient. I have reviewed the patient's H&P dated:    H&P update referred to the surgeon.         Ready For Surgery From Anesthesia Perspective.

## 2020-03-31 NOTE — PLAN OF CARE
CM requested Katharina Louise NP to order for hep b panel for outpt dialysis. CM following.        03/31/20 1219   Discharge Assessment   Assessment Type Discharge Planning Reassessment

## 2020-03-31 NOTE — ED NOTES
Assumed care from Cherry Adames RN. Patient awake, alert and oriented x 3, skin warm and dry, SHANAE pt. Brought blanket for comfort. Hep Lock in place, site OK, side rails up, call bell within reach, will continue to monitor.

## 2020-03-31 NOTE — PLAN OF CARE
Pt transferred from room 212A via [facility] to pre op . hemosplit will be placed for dialysis duie to abnormally high labs  Thrill palpated in upper left arm, dialysis shunt to immature to use at present  Pt aware of procedure to be done  Denies pain at present  Iv fluids intact    Will conitume to monitor

## 2020-03-31 NOTE — PLAN OF CARE
CM completed discharge assessment over the phone with pt. Pt verified information on facesheet as correct. Pt denies POA/LW. Pt reports living at listed address with his SO, Kim. PCP is Dr. Ricci. Pharm is [facility] on [address]. Pt denies hh/hd/dme. Pt is not currently doing dialysis but now requires. States he is okay with any facility in [place]. Reports being independent with ADLs and able to drive himself to apts. Reports that Kim will provide transportation home upon DC. DC plan is home with new outpt dialysis. CM following.         03/31/20 1015   Discharge Assessment   Assessment Type Discharge Planning Assessment   Confirmed/corrected address and phone number on facesheet? Yes   Assessment information obtained from? Patient   Communicated expected length of stay with patient/caregiver yes   Prior to hospitilization cognitive status: Alert/Oriented   Prior to hospitalization functional status: Independent   Current cognitive status: Alert/Oriented   Current Functional Status: Independent   Lives With significant other   Able to Return to Prior Arrangements yes   Is patient able to care for self after discharge? Yes   Patient's perception of discharge disposition home or selfcare   Readmission Within the Last 30 Days no previous admission in last 30 days   Patient currently being followed by outpatient case management? No   Patient currently receives any other outside agency services? No   Equipment Currently Used at Home none   Do you have any problems affording any of your prescribed medications? No   Is the patient taking medications as prescribed? yes   Does the patient have transportation home? Yes   Transportation Anticipated family or friend will provide   Does the patient receive services at the [facility]? No   Discharge Plan A Home   DME Needed Upon Discharge  none   Patient/Family in Agreement with Plan yes

## 2020-03-31 NOTE — ANESTHESIA POSTPROCEDURE EVALUATION
Anesthesia Post Evaluation    Patient: Misael Owens    Procedure(s) Performed: Procedure(s):  INSERTION, CATHETER, DIALYSIS    Final Anesthesia Type: MAC    Patient location during evaluation: PACU  Patient participation: Yes- Able to Participate  Level of consciousness: awake and alert and oriented  Post-procedure vital signs: reviewed and stable  Pain management: adequate  Airway patency: patent    PONV status at discharge: No PONV  Anesthetic complications: no      Cardiovascular status: blood pressure returned to baseline  Respiratory status: unassisted, spontaneous ventilation and room air  Hydration status: euvolemic  Follow-up not needed.          Vitals Value Taken Time   /79 3/31/2020  3:15 PM   Temp 36.6 °C (97.9 °F) 3/31/2020  3:00 PM   Pulse 87 3/31/2020  3:15 PM   Resp 20 3/31/2020  3:15 PM   SpO2 98 % 3/31/2020  3:15 PM         No case tracking events are documented in the log.      Pain/Kian Score: Pain Rating Prior to Med Admin: 10 (3/31/2020  3:27 PM)  Kian Score: 5 (3/31/2020  3:00 PM)

## 2020-03-31 NOTE — HOSPITAL COURSE
Patient monitor closely during hospitalization.  He was noted to have worsening of renal function and hyperkalemia upon admit.  He has been admitted for initiation of hemodialysis.  Nephrology and vascular surgery consulted.  He underwent placement for HemoSplit on 03/31/2020.  Patient tolerated procedure well.  Case management was consulted to assist with discharge planning needs. Hepatitis panel was ordered.

## 2020-03-31 NOTE — OP NOTE
[facility]  Surgery Department  Operative Note    SUMMARY     Date of Procedure: 3/31/2020     Procedure: Procedure(s):  INSERTION, CATHETER, DIALYSIS     Surgeon(s) and Role:     * Lm Espinosa MD - Primary    Assisting Surgeon: None    Pre-Operative Diagnosis: End stage renal disease [N18.6]    Post-Operative Diagnosis: Post-Op Diagnosis Codes:     * End stage renal disease [N18.6]    Anesthesia: LocalMAC    Description of the Procedure: Patient taken operating room placed in supine position and prepped and draped in usual sterile fashion. Access was achieved to the right internal jugular vein without any difficulty guidewire passed and the position confirmed on fluoroscopy.  Site was enlarged using 11 blade and a separate stab incision was made in the anterior chest wall inferior and medial to the vein access site. Using the tunneling device to catheter was pulled from the stab incision out through the vein access site. Graduated dilators were then placed over the guidewire to finally the dilator sheath assembly was inserted. The dilator guidewire were then removed and the catheter was inserted through the sheath in its entirety at which point the sheath was peeled away without any difficulty. Using fluoroscopic guidance the catheter was pulled back to the tip was in optimal position. At this point the catheter was evaluated each lumen was noted to draw back and flush quite easily.  Each lumen was then flushed with heparin per protocol.  The clamps were applied and the caps were placed. Catheter was then secured in position using a nylon suture in usual fashion and a 4 0 Vicryl subcuticular stitch was used to reapproximate the vein access site. Site was clean sterile dressing applied patient tolerated procedure well was transported to recovery room stable condition       Complications: No    Estimated Blood Loss (EBL): 2cc           Implants: 23 catheter R IJ    Specimens:   Specimen (12h  ago, onward)    None                  Condition: Good    Disposition: PACU - hemodynamically stable.

## 2020-03-31 NOTE — PROGRESS NOTES
HD:  2 hour tx complete, lines reinfused, catheter capped and clamped, surgical dressing CDI.  Pt tolerated first tx well.    NET UF:  0 mL

## 2020-03-31 NOTE — ASSESSMENT & PLAN NOTE
Exacerbated by end-stage renal disease.  NPO pending possible hemodialysis catheter placement tonight.  Resume renal diet.  Trend potassium  Lab Results   Component Value Date    K 5.2 (H) 03/31/2020

## 2020-04-01 ENCOUNTER — TELEPHONE (OUTPATIENT)
Dept: TRANSPLANT | Facility: CLINIC | Age: 50
End: 2020-04-01

## 2020-04-01 PROBLEM — E87.5 HYPERKALEMIA: Status: RESOLVED | Noted: 2020-03-30 | Resolved: 2020-04-01

## 2020-04-01 LAB
ALBUMIN SERPL BCP-MCNC: 3.6 G/DL (ref 3.5–5.2)
ANA SER QL IF: NORMAL
ANION GAP SERPL CALC-SCNC: 13 MMOL/L (ref 8–16)
ANION GAP SERPL CALC-SCNC: 13 MMOL/L (ref 8–16)
BASOPHILS # BLD AUTO: 0.07 K/UL (ref 0–0.2)
BASOPHILS NFR BLD: 0.8 % (ref 0–1.9)
BUN SERPL-MCNC: 50 MG/DL (ref 6–20)
BUN SERPL-MCNC: 50 MG/DL (ref 6–20)
CALCIUM SERPL-MCNC: 9.3 MG/DL (ref 8.7–10.5)
CALCIUM SERPL-MCNC: 9.3 MG/DL (ref 8.7–10.5)
CHLORIDE SERPL-SCNC: 106 MMOL/L (ref 95–110)
CHLORIDE SERPL-SCNC: 106 MMOL/L (ref 95–110)
CO2 SERPL-SCNC: 18 MMOL/L (ref 23–29)
CO2 SERPL-SCNC: 18 MMOL/L (ref 23–29)
CREAT SERPL-MCNC: 7.7 MG/DL (ref 0.5–1.4)
CREAT SERPL-MCNC: 7.7 MG/DL (ref 0.5–1.4)
DIFFERENTIAL METHOD: ABNORMAL
EOSINOPHIL # BLD AUTO: 0.5 K/UL (ref 0–0.5)
EOSINOPHIL NFR BLD: 5.8 % (ref 0–8)
ERYTHROCYTE [DISTWIDTH] IN BLOOD BY AUTOMATED COUNT: 14.4 % (ref 11.5–14.5)
EST. GFR  (AFRICAN AMERICAN): 9 ML/MIN/1.73 M^2
EST. GFR  (AFRICAN AMERICAN): 9 ML/MIN/1.73 M^2
EST. GFR  (NON AFRICAN AMERICAN): 7 ML/MIN/1.73 M^2
EST. GFR  (NON AFRICAN AMERICAN): 7 ML/MIN/1.73 M^2
GLUCOSE SERPL-MCNC: 86 MG/DL (ref 70–110)
GLUCOSE SERPL-MCNC: 86 MG/DL (ref 70–110)
HAV IGM SERPL QL IA: NEGATIVE
HBV CORE IGM SERPL QL IA: NEGATIVE
HBV SURFACE AG SERPL QL IA: NEGATIVE
HCT VFR BLD AUTO: 34.1 % (ref 40–54)
HCV AB SERPL QL IA: NEGATIVE
HGB BLD-MCNC: 10.9 G/DL (ref 14–18)
IMM GRANULOCYTES # BLD AUTO: 0.03 K/UL (ref 0–0.04)
IMM GRANULOCYTES NFR BLD AUTO: 0.4 % (ref 0–0.5)
LYMPHOCYTES # BLD AUTO: 1.6 K/UL (ref 1–4.8)
LYMPHOCYTES NFR BLD: 19.7 % (ref 18–48)
MCH RBC QN AUTO: 28.4 PG (ref 27–31)
MCHC RBC AUTO-ENTMCNC: 32 G/DL (ref 32–36)
MCV RBC AUTO: 89 FL (ref 82–98)
MONOCYTES # BLD AUTO: 0.7 K/UL (ref 0.3–1)
MONOCYTES NFR BLD: 8 % (ref 4–15)
NEUTROPHILS # BLD AUTO: 5.5 K/UL (ref 1.8–7.7)
NEUTROPHILS NFR BLD: 65.3 % (ref 38–73)
NRBC BLD-RTO: 0 /100 WBC
PHOSPHATE SERPL-MCNC: 5.9 MG/DL (ref 2.7–4.5)
PLATELET # BLD AUTO: 202 K/UL (ref 150–350)
PMV BLD AUTO: 9.6 FL (ref 9.2–12.9)
POTASSIUM SERPL-SCNC: 5 MMOL/L (ref 3.5–5.1)
POTASSIUM SERPL-SCNC: 5 MMOL/L (ref 3.5–5.1)
RBC # BLD AUTO: 3.84 M/UL (ref 4.6–6.2)
SODIUM SERPL-SCNC: 137 MMOL/L (ref 136–145)
SODIUM SERPL-SCNC: 137 MMOL/L (ref 136–145)
WBC # BLD AUTO: 8.34 K/UL (ref 3.9–12.7)

## 2020-04-01 PROCEDURE — 90935 HEMODIALYSIS ONE EVALUATION: CPT | Mod: NTX

## 2020-04-01 PROCEDURE — 85025 COMPLETE CBC W/AUTO DIFF WBC: CPT | Mod: NTX

## 2020-04-01 PROCEDURE — 94761 N-INVAS EAR/PLS OXIMETRY MLT: CPT | Mod: NTX

## 2020-04-01 PROCEDURE — 80069 RENAL FUNCTION PANEL: CPT | Mod: NTX

## 2020-04-01 PROCEDURE — 63600175 PHARM REV CODE 636 W HCPCS: Mod: NTX | Performed by: THORACIC SURGERY (CARDIOTHORACIC VASCULAR SURGERY)

## 2020-04-01 PROCEDURE — 63600175 PHARM REV CODE 636 W HCPCS: Mod: NTX | Performed by: INTERNAL MEDICINE

## 2020-04-01 PROCEDURE — 25000003 PHARM REV CODE 250: Mod: NTX | Performed by: THORACIC SURGERY (CARDIOTHORACIC VASCULAR SURGERY)

## 2020-04-01 PROCEDURE — 11000001 HC ACUTE MED/SURG PRIVATE ROOM: Mod: NTX

## 2020-04-01 PROCEDURE — 36415 COLL VENOUS BLD VENIPUNCTURE: CPT | Mod: NTX

## 2020-04-01 PROCEDURE — 80074 ACUTE HEPATITIS PANEL: CPT | Mod: NTX

## 2020-04-01 RX ORDER — HEPARIN SODIUM 1000 [USP'U]/ML
4000 INJECTION, SOLUTION INTRAVENOUS; SUBCUTANEOUS
Status: DISCONTINUED | OUTPATIENT
Start: 2020-04-01 | End: 2020-04-03 | Stop reason: HOSPADM

## 2020-04-01 RX ADMIN — LABETALOL HYDROCHLORIDE 200 MG: 200 TABLET, FILM COATED ORAL at 09:04

## 2020-04-01 RX ADMIN — HEPARIN SODIUM 5000 UNITS: 5000 INJECTION, SOLUTION INTRAVENOUS; SUBCUTANEOUS at 05:04

## 2020-04-01 RX ADMIN — HEPARIN SODIUM 5000 UNITS: 5000 INJECTION, SOLUTION INTRAVENOUS; SUBCUTANEOUS at 09:04

## 2020-04-01 RX ADMIN — EPOETIN ALFA-EPBX 8000 UNITS: 4000 INJECTION, SOLUTION INTRAVENOUS; SUBCUTANEOUS at 03:04

## 2020-04-01 RX ADMIN — LABETALOL HYDROCHLORIDE 200 MG: 200 TABLET, FILM COATED ORAL at 08:04

## 2020-04-01 NOTE — PROGRESS NOTES
Tx complete, pt tolerated well, VSS, NAD, lines re-infused and Hep locked per protocol.     Net UF: 1000

## 2020-04-01 NOTE — DISCHARGE SUMMARY
[facility] Hospital Medicine  Discharge Summary      Patient Name: Misael Owens  MRN: [id number]  Admission Date: 3/30/2020  Hospital Length of Stay: 2 days  Discharge Date and Time:   4/03/20   Attending Physician: Crystal Palacio MD   Discharging Provider: JORDAN Echols  Primary Care Provider: Katharina Ricci MD      HPI:   Misael Owens is a 50 y.o. male with PMH of chronic kidney disease stage 5, hypertension, morbid obesity and hyperparathyroidism, renal etiology who presents to ED for initiation of hemodialysis.  He had laboratory data which demonstrated a potassium of 6.1 and creatinine of 9.5.  Patient is status post AV fistula placement on 2/20/2020 and transposition, vein, basilic on 3/2/2020.  He denies shortness of breath chest pain fever or chills.     Procedure(s):  INSERTION, CATHETER, DIALYSIS      Hospital Course:   Patient monitor closely during hospitalization.  He was noted to have worsening of renal function and hyperkalemia upon admit.  He has been admitted for initiation of hemodialysis.  Nephrology and vascular surgery consulted.  He underwent placement for HemoSplit on 03/31/2020.  Patient tolerated procedure well.  Case management was consulted to assist with discharge planning needs. Hepatitis panel was ordered.  Patient was discharged to home once outpatient dialysis chair was obtained and patient was cleared for discharge by Nephrology.     Consults:   Consults (From admission, onward)        Status Ordering Provider     Inpatient consult to Nephrology  Once     Provider:  Carmelo José MD    Acknowledged LM ESPINOSA     Inpatient consult to Social Work/Case Management         Acknowledged CARMELO JOSÉ     Inpatient consult to Vascular Surgery  Once     Provider:  Lm Espinosa MD    Acknowledged LM ESPINOSA        Service: Hospital Medicine    Final Active Diagnoses:    Diagnosis Date Noted POA    PRINCIPAL PROBLEM:  ESRD (end stage renal  disease) [N18.6] 03/02/2020 Yes    Hypertensive renal disease, malignant, stage 5 chronic kidney disease or end stage renal disease [I12.0] 03/30/2020 Yes    Anemia of chronic kidney failure, stage 5 [N18.5, D63.1] 03/30/2020 Yes    Class 2 severe obesity with serious comorbidity in adult [E66.01] 03/30/2020 Yes    Metabolic acidosis [E87.2] 03/30/2020 Yes    Secondary hyperparathyroidism [N25.81] 11/15/2019 Yes      Problems Resolved During this Admission:    Diagnosis Date Noted Date Resolved POA    Hyperkalemia [E87.5] 03/30/2020 04/01/2020 Yes       Discharged Condition: stable    Disposition: Home or Self Care    Follow Up:  Follow-up Information     Dialysis clinic.    Why:  Follow-up with outpatient dialysis clinic as instructed           Nephrology In 1 week.               Patient Instructions:      Diet renal     Notify your health care provider if you experience any of the following:  difficulty breathing or increased cough     Notify your health care provider if you experience any of the following:  temperature >100.4     Notify your health care provider if you experience any of the following:   Order Comments: Any decline in condition     Leave dressing on - Keep it clean, dry, and intact until clinic visit     Activity as tolerated       Significant Diagnostic Studies:     Sed rate 43    Magnesium 1.6    CRP 7.0    Vitamin D25 hydroxy 28    .0    Urine protein 111    Urine random creatinine 81.2    Urine Heller's stain- No eosinophils seen    CMP   Recent Labs   Lab 03/30/20  1703 03/31/20  0506 04/01/20  0503    140 137  137   K 5.3* 5.2* 5.0  5.0    111* 106  106   CO2 19* 19* 18*  18*    86 86  86   BUN 67* 70* 50*  50*   CREATININE 9.1* 9.1* 7.7*  7.7*   CALCIUM 9.0 8.8 9.3  9.3   PROT 7.6  --   --    ALBUMIN 3.8  --  3.6   BILITOT 0.4  --   --    ALKPHOS 65  --   --    AST 9*  --   --    ALT 18  --   --    ANIONGAP 10 10 13  13   ESTGFRAFRICA 7* 7* 9*  9*    EGFRNONAA 6* 6* 7*  7*   CBC   Recent Labs   Lab 03/30/20  1703 03/31/20  0506 04/01/20  0503   WBC 5.96 6.20 8.34   HGB 9.9* 10.2* 10.9*   HCT 32.1* 32.6* 34.1*    214 202       XR CHEST AP PORTABLE-    CLINICAL HISTORY:  s/p tunneled hemodialysis catheter;    TECHNIQUE:  Single frontal view of the chest was performed.    COMPARISON:  12/18/2019.    FINDINGS:  There is mild pulmonary hypoinflation.  Patient is rotated to the RPO position.  This slightly limits evaluation.  No focal consolidation.  No significant pleural effusion.    Heart size appears enlarged secondary to rotated technique.  Mediastinal contours unremarkable.    There has been placement of a dual lumen right IJ catheter which terminates in the distal SVC.      Impression       1. No acute chest disease.  2. Right IJ catheter placement.      Electronically signed by: Vicente Harley  Date: 03/31/2020  Time: 15:12          Pending Diagnostic Studies:     Procedure Component Value Units Date/Time    Hepatitis B Core Antibody, Total [[id number]] Collected:  04/01/20 0503    Order Status:  Sent Lab Status:  In process Updated:  04/01/20 0503    Specimen:  Blood     Hepatitis B Core Antibody, Total [[id number]] Collected:  04/01/20 0503    Order Status:  Sent Lab Status:  In process Updated:  04/01/20 0503    Specimen:  Blood     Hepatitis B Surface Ab, Qualitative [[id number]] Collected:  04/01/20 0503    Order Status:  Sent Lab Status:  In process Updated:  04/01/20 0503    Specimen:  Blood     Hepatitis B Surface Ab, Qualitative [[id number]] Collected:  04/01/20 0503    Order Status:  Sent Lab Status:  In process Updated:  04/01/20 0503    Specimen:  Blood     Hepatitis B Surface Antigen [[id number]] Collected:  04/01/20 0503    Order Status:  Sent Lab Status:  In process Updated:  04/01/20 0503    Specimen:  Blood     Hepatitis B Surface Antigen [[id number]] Collected:  04/01/20 0503    Order Status:  Sent Lab Status:  In process Updated:   04/01/20 0503    Specimen:  Blood     Hepatitis C Antibody [[id number]] Collected:  04/01/20 0503    Order Status:  Sent Lab Status:  In process Updated:  04/01/20 0503    Specimen:  Blood     Hepatitis Panel, Acute [[id number]] Collected:  04/01/20 0503    Order Status:  Sent Lab Status:  In process Updated:  04/01/20 0926    Specimen:  Blood          Medications:  Reconciled Home Medications:      Medication List      CHANGE how you take these medications    calcitRIOL 0.5 MCG Cap  Commonly known as:  ROCALTROL  Take 1 capsule (0.5 mcg total) by mouth once daily.  What changed:  when to take this     ergocalciferol 50,000 unit Cap  Commonly known as:  ERGOCALCIFEROL  Take 1 capsule (50,000 Units total) by mouth every 7 days.  What changed:  additional instructions     furosemide 40 MG tablet  Commonly known as:  LASIX  Take 1 tablet (40 mg total) by mouth once daily.  What changed:  when to take this        CONTINUE taking these medications    acetaminophen 325 MG tablet  Commonly known as:  TYLENOL  Take 325 mg by mouth every 6 (six) hours as needed for Pain. Takes 1-2 tablets prn     labetaloL 200 MG tablet  Commonly known as:  NORMODYNE  Take 200 mg by mouth every 12 (twelve) hours.     oxyCODONE-acetaminophen 5-325 mg per tablet  Commonly known as:  PERCOCET  Take 1 tablet by mouth every 6 (six) hours as needed for Pain.          Medications discontinued-  sodium bicarbonate 650 MG tablet  Take 2 tablets (1,300 mg total) by mouth 2 (two) times daily.                Indwelling Lines/Drains at time of discharge:   Lines/Drains/Airways     Drain                 Hemodialysis AV Fistula 01/07/20 Left upper arm 85 days                Time spent on the discharge of patient: 68 minutes  Patient was seen and examined on the date of discharge and determined to be suitable for discharge.       Zaria Ornelas, JORDAN  Department of Hospital Medicine  [facility]

## 2020-04-01 NOTE — PLAN OF CARE
Pt alert and oriented. Ambulates. Had hemodialysis today. Plan of care continued. Call light in reach

## 2020-04-01 NOTE — SUBJECTIVE & OBJECTIVE
Interval History:  Hepatitis panel pending.  Awaiting approval for outpatient dialysis chair.    Review of Systems   Constitutional: Negative for activity change, appetite change, chills, diaphoresis, fatigue and fever.   HENT: Negative for congestion, facial swelling and sore throat.    Eyes: Negative for pain and redness.   Respiratory: Negative for cough, shortness of breath and wheezing.    Cardiovascular: Negative for chest pain and palpitations.   Gastrointestinal: Negative for abdominal distention, abdominal pain, blood in stool, diarrhea, nausea and vomiting.   Endocrine: Negative for polydipsia and polyphagia.   Genitourinary: Negative for difficulty urinating, dysuria, flank pain and hematuria.        Patient reports he still voids   Musculoskeletal: Negative for neck pain and neck stiffness.   Skin: Negative for color change.   Allergic/Immunologic: Negative for food allergies.   Neurological: Negative for seizures, syncope, facial asymmetry, speech difficulty and weakness.   Hematological: Does not bruise/bleed easily.   Psychiatric/Behavioral: Negative for agitation, behavioral problems, confusion and dysphoric mood. The patient is not nervous/anxious.      Objective:     Vital Signs (Most Recent):  Temp: 98.4 °F (36.9 °C) (04/01/20 0431)  Pulse: 68 (04/01/20 0726)  Resp: 16 (04/01/20 0726)  BP: 125/81 (04/01/20 0726)  SpO2: 96 % (04/01/20 0726) Vital Signs (24h Range):  Temp:  [97.2 °F (36.2 °C)-98.4 °F (36.9 °C)] 98.4 °F (36.9 °C)  Pulse:  [68-93] 68  Resp:  [16-24] 16  SpO2:  [95 %-100 %] 96 %  BP: (113-172)/() 125/81     Weight: 119.7 kg (264 lb)  Body mass index is 36.82 kg/m².    Intake/Output Summary (Last 24 hours) at 4/1/2020 1021  Last data filed at 3/31/2020 1835  Gross per 24 hour   Intake 500 ml   Output 500 ml   Net 0 ml      Physical Exam   Constitutional: He is oriented to person, place, and time. He appears well-developed and well-nourished. No distress.   HENT:   Head:  Normocephalic and atraumatic.   Eyes: Pupils are equal, round, and reactive to light. Conjunctivae and EOM are normal. Right eye exhibits no discharge. Left eye exhibits no discharge.   Neck: Normal range of motion. Neck supple. No JVD present.   Cardiovascular: Intact distal pulses.   Pulmonary/Chest: Effort normal. No respiratory distress.   Abdominal: Soft. He exhibits no distension. There is no tenderness. There is no guarding.   Genitourinary:   Genitourinary Comments: Not examined   Musculoskeletal: Normal range of motion.   Neurological: He is alert and oriented to person, place, and time.   Skin: Skin is warm and dry. Capillary refill takes less than 2 seconds. He is not diaphoretic.   Chest dressing dry and intact   Psychiatric: He has a normal mood and affect. His behavior is normal. Judgment and thought content normal.   Nursing note and vitals reviewed.     Labs reviewed

## 2020-04-01 NOTE — CONSULTS
"HPI: Misael Owens is a 50 y.o. male with PMH of chronic kidney disease stage 5, hypertension, morbid obesity and hyperparathyroidism, renal etiology who presents to ED for initiation of hemodialysis. He is a very poor historian. He vaguely remembers being told " your kidneys have failed" during an ER visit nearly a year ago in [place]. He is followed by nephrology in [place] and has had a AVF placed in the last 2 months. HE was referred here by his PCP after outpatient labs revealed worsening acidosis and hyperkalemia. He notes "OK" UOP and denies hematuria, flank pain, stones, incontinence, LUTS    ROS:  "real good, man"  +chronic low back pain    -f/c/sweats  -n/v/d, decreased appetite  -CP, angina, SOB, IBARRA  -focal neuro deficits  - cough, wheezing, SOB  -myalgias  -rash, lesions         Past Medical History:   Diagnosis Date    CKD (chronic kidney disease), stage IV       due to hypertension    Hypertension      Secondary hyperparathyroidism      Vitamin D deficiency                 Past Surgical History:   Procedure Laterality Date    AV FISTULA PLACEMENT Left 1/7/2020     Procedure: CREATION, AV FISTULA, brachiobasillic;  Surgeon: Derrek Grant MD;  Location: [address];  Service: Peripheral Vascular;  Laterality: Left;    FISTULOGRAM Left 2/4/2020     Procedure: Fistulogram;  Surgeon: Derrek Grant MD;  Location: [address];  Service: Peripheral Vascular;  Laterality: Left;  Fluro: 2.7 min  mGy: 23.74  contrast : 17ml    RENAL BIOPSY        TRANSPOSITION OF BASILIC VEIN Left 3/2/2020     Procedure: TRANSPOSITION, VEIN, BASILIC;  Surgeon: Derrek Grant MD;  Location: [address];  Service: Peripheral Vascular;  Laterality: Left;               Review of patient's allergies indicates:   Allergen Reactions    Lisinopril Swelling       Facial swelling             Current Facility-Administered Medications on File Prior to Encounter   Medication    0.9%  NaCl infusion    " ceFAZolin injection 2 g    lidocaine (PF) 10 mg/ml (1%) injection 10 mg    mupirocin 2 % ointment    ondansetron injection 4 mg    sodium chloride 0.9% flush 10 mL           Current Outpatient Medications on File Prior to Encounter   Medication Sig    acetaminophen (TYLENOL) 325 MG tablet Take 325 mg by mouth every 6 (six) hours as needed for Pain. Takes 1-2 tablets prn    calcitRIOL (ROCALTROL) 0.5 MCG Cap Take 1 capsule (0.5 mcg total) by mouth once daily. (Patient taking differently: Take 0.5 mcg by mouth every morning. )    ergocalciferol (ERGOCALCIFEROL) 50,000 unit Cap Take 1 capsule (50,000 Units total) by mouth every 7 days. (Patient taking differently: Take 50,000 Units by mouth every 7 days. Takes on Saturdays)    furosemide (LASIX) 40 MG tablet Take 1 tablet (40 mg total) by mouth once daily. (Patient taking differently: Take 40 mg by mouth every morning. )    labetalol (NORMODYNE) 200 MG tablet Take 200 mg by mouth every 12 (twelve) hours.    oxyCODONE-acetaminophen (PERCOCET) 5-325 mg per tablet Take 1 tablet by mouth every 6 (six) hours as needed for Pain.    sodium bicarbonate 650 MG tablet Take 2 tablets (1,300 mg total) by mouth 2 (two) times daily.           Family History      Problem Relation (Age of Onset)     Cancer Father     Diabetes Mother, Other     Heart disease Other     Hypertension Mother     Lung cancer Maternal Uncle               Tobacco Use    Smoking status: Never Smoker    Smokeless tobacco: Never Used   Substance and Sexual Activity    Alcohol use: No    Drug use: No    Sexual activity: Not on file         PE:  AA  NAD  Upright no SOB, no JVD  RRR, no rub. +BLE edema  CTA B  S/nt abd  Vital Signs      Report   View Graph    03/30 0700 03/31 0659 03/31 0700 03/31 1948  Most Recent     Temp (°F) 97.3-98.6 97.2-98  97.8 (36.6)  03/31 1941   Pulse 71-76 69-93  78  03/31 1941   Resp 18-20 16-24  18  03/31 1941   //94 113//83  119/71  03/31 1941    MAP (mmHg)    90  03/31 1941   SpO2 (%) 95-98   97  03/31 1941   Weight (kg) 120.2 119.7  119.7 kg (264 lb)  03/31 1223   Intake/Output      Report   View Table   Net  0mL  (0 mL / 119.7 kg = 0 mL/kg)  3/31 0700 - 4/1 0659  76643/5141245/31  500  500  --  0  500  (4.2)  500  Other  In  Out  Other  Net          LABS:  Selected Labs      Report   (Up to last 2 results from the past 72 hours)   Switch View    03/30 1703 03/31 0506   Sodium 139     140       Potassium 5.3High      5.2High        Chloride 110     111High        CO2 19Low      19Low        BUN, Bld 67High      70High        Creatinine 9.1High      9.1High        Glucose 103     86       Magnesium 1.6     --       WBC 5.96     6.20       Hemoglobin 9.9Low      10.2Low        Hematocrit 32.1Low      32.6Low        Platelets 223     214       Phosphorus 4.3     --              A/P:    A/w hyperkalemia, metabolic acidosis and need to initiate HD. Otherwise stable on admit. AVF with good thrill and bruit on exam, but still may not yet be mature and thus will need cath for now.      ESRD  Cath placement as above  HD today x 2 hours  Desires outpatient in [place] area      HTN  Acceptable control  Hold UF for now      Hyper K+  F/u with HD  Renal diet      NAGMA  F/u with HD      Sec HPT  F/u PO4

## 2020-04-01 NOTE — PLAN OF CARE
Patient AAOX4 resting in bed, appears asleep, eyes closed, respirations even and unlabored. NAD. Denies pain or SOB. VSS. Afebrile. No telemetry. Up independently. Room air. Medications administered per MD order. Heparin for VTE prevention. Bed alarm active. Side Rails up X2. Plan of care reviewed with patient. Verbalizes understanding.Call light in reach. Pt free from fall or injury. Will monitor.

## 2020-04-01 NOTE — PROGRESS NOTES
Progress Note  [facility]    Admit Date: 3/30/2020   LOS: 2 days      TELEMEDICINE VISIT    SUBJECTIVE:     Follow-up For:  ESRD    Pleasant gentleman and seen via telemedicine with Vijay, the dialysis nurse.  Patient is currently on dialysis and having no problems.  Blood pressure is good.  He denies any complaints, specifically no shortness of breath, cough, sore throat or fever.  Mental status clear now.    Review of Systems:  GENERAL: NO fever, chills, night sweats, decreased intake  HEENT: NO blurry vision, glasses, floaters, hearing defects, sore throat  CV:  NO CP, Palpitations, Edema, Arrhythmias  PULM:  NO Cough, SOB, IBARRA, Hemoptysis, PND, Orthopnea  GI:  NO Nausea, Vomiting, Diarrhea, BRBPR, Melena, Abdominal Pain  :  NO Dysuria, Frequency, Urgency, Hematuria  NEURO: NO LOC, Seizure activity, Dizziness  SKIN:  NO Rash, Pruritis, Petechiae  MS:  NO Arthralgia, Muscle Aches, Arthritis      OBJECTIVE:     Vital Signs (Most Recent)  Temp: 98.4 °F (36.9 °C) (04/01/20 0431)  Pulse: 72 (04/01/20 1430)  Resp: 16 (04/01/20 0726)  BP: 132/80 (04/01/20 1430)  SpO2: 96 % (04/01/20 0726)    Vital Signs Range (Last 24H):  Temp:  [97.5 °F (36.4 °C)-98.4 °F (36.9 °C)]   Pulse:  [68-93]   Resp:  [16-24]   BP: (116-172)/()   SpO2:  [95 %-100 %]     I/O last 3 completed shifts:  In: 500 [Other:500]  Out: 500 [Other:500]    Physical Exam:   The patient is awake and alert and in no distress.  He answers questions now with a clear mental status.  The patient is being dialyzed through a right internal jugular tunneled catheter.  The nurse reports good bruit and thrill in his left upper extremity AVF. The nurse also reports no peripheral edema.    Laboratory:  Recent Labs   Lab 04/01/20  0503   HGB 10.9*   HCT 34.1*   WBC 8.34          Recent Labs   Lab 03/27/20  1100 03/30/20  1703 03/31/20  0506 04/01/20  0503    139 140 137  137   K 6.1* 5.3* 5.2* 5.0  5.0    110 111* 106   106   CO2 19* 19* 19* 18*  18*   BUN 64* 67* 70* 50*  50*   CREATININE 9.5* 9.1* 9.1* 7.7*  7.7*   GLU 94 103 86 86  86   CALCIUM 9.0 9.0 8.8 9.3  9.3   PHOS 4.7* 4.3  --  5.9*       Lab Results   Component Value Date    .0 (H) 03/27/2020    CALCIUM 9.3 04/01/2020    CALCIUM 9.3 04/01/2020    PHOS 5.9 (H) 04/01/2020     Problem List:    Active Hospital Problems    Diagnosis  POA    *ESRD (end stage renal disease) [N18.6]  Yes     Nephrology consulted for initiation of hemodialysis.  Dialysis orders per Nephrology.  Trend renal function.      Hypertensive renal disease, malignant, stage 5 chronic kidney disease or end stage renal disease [I12.0]  Yes    Anemia of chronic kidney failure, stage 5 [N18.5, D63.1]  Yes    Class 2 severe obesity with serious comorbidity in adult [E66.01]  Yes    Metabolic acidosis [E87.2]  Yes    Secondary hyperparathyroidism [N25.81]  Yes      Resolved Hospital Problems    Diagnosis Date Resolved POA    Hyperkalemia [E87.5] 04/01/2020 Yes       Nephrology Assessment/Plan:    1.  ESRD  Tunnel catheter placed and now receiving second dialysis  Outpatient dialysis being arranged at [facility]     2.  HTN  Acceptable control for now  Not fluid overloaded and needs gentle ultrafiltration     3.  Hyper K+  Resolved with dialysis  Renal diet     4.  NAGMA  No change with short dialysis  Follow-up over time with outpatient dialysis     5.  Sec HPT  Phosphorus a little high  Start binders and calcitriol outpatient  Monitor phosphorus outpatient    6.  Anemia  Patient is at his ESRD goal  Give EPO with dialysis    Roger Hadley M.D.

## 2020-04-01 NOTE — PROGRESS NOTES
[facility] Hospital Medicine  Progress Note    Patient Name: Misael Owens  MRN: [id number]  Patient Class: IP- Inpatient   Admission Date: 3/30/2020  Length of Stay: 2 days  Attending Physician: Crystal Palacio MD  Primary Care Provider: Katharina Ricci MD    Subjective:     Principal Problem:ESRD (end stage renal disease) with hemodialysis    HPI:  Misael Owens is a 50 y.o. male with PMH of chronic kidney disease stage 5, hypertension, morbid obesity and hyperparathyroidism, renal etiology who presents to ED for initiation of hemodialysis.  He had laboratory data which demonstrated a potassium of 6.1 and creatinine of 9.5.  Patient is status post AV fistula placement on 2/20/2020 and transposition, vein, basilic on 3/2/2020.  He denies shortness of breath chest pain fever or chills.     Overview/Hospital Course:  Patient monitor closely during hospitalization.  He was noted to have worsening of renal function and hyperkalemia upon admit.  He has been admitted for initiation of hemodialysis.  Nephrology and vascular surgery consulted.  He underwent placement for HemoSplit on 03/31/2020.  Patient tolerated procedure well.  Case management was consulted to assist with discharge planning needs. Hepatitis panel was ordered.  Patient was discharged to home once outpatient dialysis chair was obtained and patient was cleared for discharge by Nephrology.    Interval History:  Hepatitis panel pending.  Awaiting approval for outpatient dialysis chair.    Review of Systems   Constitutional: Negative for activity change, appetite change, chills, diaphoresis, fatigue and fever.   HENT: Negative for congestion, facial swelling and sore throat.    Eyes: Negative for pain and redness.   Respiratory: Negative for cough, shortness of breath and wheezing.    Cardiovascular: Negative for chest pain and palpitations.   Gastrointestinal: Negative for abdominal distention, abdominal pain, blood in stool, diarrhea,  nausea and vomiting.   Endocrine: Negative for polydipsia and polyphagia.   Genitourinary: Negative for difficulty urinating, dysuria, flank pain and hematuria.        Patient reports he still voids   Musculoskeletal: Negative for neck pain and neck stiffness.   Skin: Negative for color change.   Allergic/Immunologic: Negative for food allergies.   Neurological: Negative for seizures, syncope, facial asymmetry, speech difficulty and weakness.   Hematological: Does not bruise/bleed easily.   Psychiatric/Behavioral: Negative for agitation, behavioral problems, confusion and dysphoric mood. The patient is not nervous/anxious.      Objective:     Vital Signs (Most Recent):  Temp: 98.4 °F (36.9 °C) (04/01/20 0431)  Pulse: 68 (04/01/20 0726)  Resp: 16 (04/01/20 0726)  BP: 125/81 (04/01/20 0726)  SpO2: 96 % (04/01/20 0726) Vital Signs (24h Range):  Temp:  [97.2 °F (36.2 °C)-98.4 °F (36.9 °C)] 98.4 °F (36.9 °C)  Pulse:  [68-93] 68  Resp:  [16-24] 16  SpO2:  [95 %-100 %] 96 %  BP: (113-172)/() 125/81     Weight: 119.7 kg (264 lb)  Body mass index is 36.82 kg/m².    Intake/Output Summary (Last 24 hours) at 4/1/2020 1021  Last data filed at 3/31/2020 1835  Gross per 24 hour   Intake 500 ml   Output 500 ml   Net 0 ml      Physical Exam   Constitutional: He is oriented to person, place, and time. He appears well-developed and well-nourished. No distress.   HENT:   Head: Normocephalic and atraumatic.   Eyes: Pupils are equal, round, and reactive to light. Conjunctivae and EOM are normal. Right eye exhibits no discharge. Left eye exhibits no discharge.   Neck: Normal range of motion. Neck supple. No JVD present.   Cardiovascular: Intact distal pulses.   Pulmonary/Chest: Effort normal. No respiratory distress.   Abdominal: Soft. He exhibits no distension. There is no tenderness. There is no guarding.   Genitourinary:   Genitourinary Comments: Not examined   Musculoskeletal: Normal range of motion.   Neurological: He is alert  and oriented to person, place, and time.   Skin: Skin is warm and dry. Capillary refill takes less than 2 seconds. He is not diaphoretic.   Chest dressing dry and intact   Psychiatric: He has a normal mood and affect. His behavior is normal. Judgment and thought content normal.   Nursing note and vitals reviewed.     Labs reviewed      Assessment/Plan:      * ESRD (end stage renal disease)  With hemodialysis, status post  HemoSplit placement on 03/30/2020-  Case management consulted to assist with obtaining outpatient dialysis chair for patient  Patient schedule for Tuesday, Thursday, Saturday dialysis  Orders as per Nephrology      Metabolic acidosis  Orders as per Nephrology    Class 2 severe obesity with serious comorbidity in adult  Body mass index is 38.02 kg/m².  General weight loss/lifestyle modification strategies discussed (elicit support from others; identify saboteurs; non-food rewards, etc).      Anemia of chronic kidney failure, stage 5  Patient's anemia is currently controlled. Has not received any PRBCs to date.. Etiology likely d/t renal disease  Current CBC reviewed-   Lab Results   Component Value Date    HGB 9.9 (L) 03/30/2020    HCT 32.1 (L) 03/30/2020     Monitor serial CBC and transfuse if patient becomes hemodynamically unstable, symptomatic or H/H drops below 7/21.         Hypertensive renal disease, malignant, stage 5 chronic kidney disease or end stage renal disease  Monitor  Titrate medications as needed  Will utilize p.r.n. blood pressure medication only if patient's blood pressure greater than  180/110 and he develops symptoms such as worsening chest pain or shortness of breath.        Hyperkalemia  Exacerbated by end-stage renal disease.  NPO pending possible hemodialysis catheter placement tonight.  Resume renal diet.  Trend potassium  Lab Results   Component Value Date    K 5.2 (H) 03/31/2020         Secondary hyperparathyroidism  Chronic resume home vitamin-D analogs.  Defer to  Nephrology.        VTE Risk Mitigation (From admission, onward)         Ordered     heparin (porcine) injection 5,000 Units  Every 8 hours      03/30/20 2241     IP VTE HIGH RISK PATIENT  Once      03/30/20 2241                Time spent seeing patient( greater than 1/2 spent in direct contact) : 36 minutes      JORDAN Echols  Department of Hospital Medicine   [facility]

## 2020-04-01 NOTE — PROGRESS NOTES
Vital Signs    Report   View Graph    03/31 0700  04/01 0659 04/01 0700  04/01 0718  Most Recent     Temp (°F) 97.2-98.4    98.4 (36.9)  04/01 0431   Pulse 69-93    78  04/01 0431   Resp 16-24     18  04/01 0431   /57 -172/83     134/58   04/01 0431   MAP (mmHg)     83  04/01 0431   SpO2 (%) 93 -100    95  04/01 0431   Weight (kg) 119.          Report   Scheduled     Medication Last Action   epoetin elle-epbx injection 8,000 Units Ordered   8,000 Units, Every Mon, Wed, Fri, IV       heparin (porcine) injection 5,000 Units Given   5,000 Units, Q8H, SubQ    04/01 0525   labetaloL tablet 200 mg Given   200 mg, Q12H, Oral    03/31 2107      PRN     Medication Last Action   dextrose 50% injection 12.5 g Ordered   12.5 g, PRN, IV       dextrose 50% injection 25 g Ordered   25 g, PRN, IV       glucagon (human recombinant) injection 1 mg Ordered   1 mg, PRN, IM       glucose chewable tablet 16 g Ordered   16 g, PRN, Oral       glucose chewable tablet 24 g Ordered   24 g, PRN, Oral       magnesium oxide tablet 800 mg Ordered   800 mg, PRN, Oral       magnesium oxide tablet 800 mg Ordered   800 mg, PRN, Oral       ondansetron injection 8 mg Ordered   8 mg, Q8H PRN, IV       potassium chloride 10% oral solution 40 mEq Ordered   40 mEq, PRN, Oral       potassium chloride 10% oral solution 40 mEq Ordered   40 mEq, PRN, Oral       potassium, sodium phosphates 280-160-250 mg packet 2 packet Ordered   2 packet, PRN, Oral       potassium, sodium phosphates 280-160-250 mg packet 2 packet Ordered   2 packet, PRN, Oral       potassium, sodium phosphates 280-160-250 mg packet 2 packet Ordered   2 packet, PRN, Oral            Selected Labs    Report   (Up to last 2 results from the past 72 hours)   Switch View    03/30 1703 03/31 0506 04/01 0503   Sodium --     --     137        --     --     137       Potassium --     --     5.0        --     --     5.0       Chloride --     --     106        --     --     106        CO2 --     --     18Low         --     --     18Low        BUN, Bld --     --     50High         --     --     50High        Creatinine --     --     7.7High         --     --     7.7High        Glucose --     --     86        --     --     86       Magnesium 1.6     --     --       WBC --     6.20     8.34       Hemoglobin --     10.2Low      10.9Low        Hematocrit --     32.6Low      34.1Low        Platelets --     214     202       Phosphorus 4.3     --     5.9High              ESRD  Cath placement as above  HD today x 2 hours  Desires outpatient in [place] area       HTN  Acceptable control  Hold UF for now       Hyper K+  F/u with HD  Renal diet       NAGMA  F/u with HD       Sec HPT  F/u PO4

## 2020-04-01 NOTE — PLAN OF CARE
CM received call from Moraima with [facility] ([phone]). Per Moraima, pt has tentative chair time at [facility] on [facility] for T, TH, SAT 12PM starting the 7th  (pending hep panel) due to clinic not taking new admits on the weekends. Nevin stated if hep panel results today then she can most likely get start date for tomorrow. CM following .      1330- hep panel still pending.     1450- hep panel still pending    1550-hep panel still pending    1420- hep panel still pending. CM updated pt's nurse, charge nurse, and CARLOS A Enciso via secure chat     04/01/20 0906   Post-Acute Status   Post-Acute Authorization Dialysis   Diaylsis Status Additional Clinical Requested

## 2020-04-02 LAB
ANION GAP SERPL CALC-SCNC: 12 MMOL/L (ref 8–16)
BASOPHILS # BLD AUTO: 0.05 K/UL (ref 0–0.2)
BASOPHILS NFR BLD: 0.7 % (ref 0–1.9)
BUN SERPL-MCNC: 42 MG/DL (ref 6–20)
CALCIUM SERPL-MCNC: 8.9 MG/DL (ref 8.7–10.5)
CHLORIDE SERPL-SCNC: 103 MMOL/L (ref 95–110)
CO2 SERPL-SCNC: 22 MMOL/L (ref 23–29)
CREAT SERPL-MCNC: 7.1 MG/DL (ref 0.5–1.4)
DIFFERENTIAL METHOD: ABNORMAL
EOSINOPHIL # BLD AUTO: 0.5 K/UL (ref 0–0.5)
EOSINOPHIL NFR BLD: 7.8 % (ref 0–8)
ERYTHROCYTE [DISTWIDTH] IN BLOOD BY AUTOMATED COUNT: 14.3 % (ref 11.5–14.5)
EST. GFR  (AFRICAN AMERICAN): 9 ML/MIN/1.73 M^2
EST. GFR  (NON AFRICAN AMERICAN): 8 ML/MIN/1.73 M^2
GLUCOSE SERPL-MCNC: 88 MG/DL (ref 70–110)
HBV SURFACE AB SER-ACNC: NEGATIVE M[IU]/ML
HCT VFR BLD AUTO: 35.3 % (ref 40–54)
HGB BLD-MCNC: 11 G/DL (ref 14–18)
IMM GRANULOCYTES # BLD AUTO: 0.04 K/UL (ref 0–0.04)
IMM GRANULOCYTES NFR BLD AUTO: 0.6 % (ref 0–0.5)
LYMPHOCYTES # BLD AUTO: 1.5 K/UL (ref 1–4.8)
LYMPHOCYTES NFR BLD: 21.9 % (ref 18–48)
MCH RBC QN AUTO: 27.5 PG (ref 27–31)
MCHC RBC AUTO-ENTMCNC: 31.2 G/DL (ref 32–36)
MCV RBC AUTO: 88 FL (ref 82–98)
MONOCYTES # BLD AUTO: 0.7 K/UL (ref 0.3–1)
MONOCYTES NFR BLD: 10 % (ref 4–15)
NEUTROPHILS # BLD AUTO: 3.9 K/UL (ref 1.8–7.7)
NEUTROPHILS NFR BLD: 59 % (ref 38–73)
NRBC BLD-RTO: 0 /100 WBC
PLATELET # BLD AUTO: 188 K/UL (ref 150–350)
PMV BLD AUTO: 9.7 FL (ref 9.2–12.9)
POTASSIUM SERPL-SCNC: 4.3 MMOL/L (ref 3.5–5.1)
RBC # BLD AUTO: 4 M/UL (ref 4.6–6.2)
SODIUM SERPL-SCNC: 137 MMOL/L (ref 136–145)
WBC # BLD AUTO: 6.68 K/UL (ref 3.9–12.7)

## 2020-04-02 PROCEDURE — 94761 N-INVAS EAR/PLS OXIMETRY MLT: CPT | Mod: NTX

## 2020-04-02 PROCEDURE — 11000001 HC ACUTE MED/SURG PRIVATE ROOM: Mod: NTX

## 2020-04-02 PROCEDURE — 63600175 PHARM REV CODE 636 W HCPCS: Mod: NTX | Performed by: THORACIC SURGERY (CARDIOTHORACIC VASCULAR SURGERY)

## 2020-04-02 PROCEDURE — 25000003 PHARM REV CODE 250: Mod: NTX | Performed by: THORACIC SURGERY (CARDIOTHORACIC VASCULAR SURGERY)

## 2020-04-02 PROCEDURE — 36415 COLL VENOUS BLD VENIPUNCTURE: CPT | Mod: NTX

## 2020-04-02 PROCEDURE — 80048 BASIC METABOLIC PNL TOTAL CA: CPT | Mod: NTX

## 2020-04-02 PROCEDURE — 85025 COMPLETE CBC W/AUTO DIFF WBC: CPT | Mod: NTX

## 2020-04-02 PROCEDURE — 86706 HEP B SURFACE ANTIBODY: CPT | Mod: NTX

## 2020-04-02 RX ADMIN — HEPARIN SODIUM 5000 UNITS: 5000 INJECTION, SOLUTION INTRAVENOUS; SUBCUTANEOUS at 03:04

## 2020-04-02 RX ADMIN — LABETALOL HYDROCHLORIDE 200 MG: 200 TABLET, FILM COATED ORAL at 08:04

## 2020-04-02 RX ADMIN — HEPARIN SODIUM 5000 UNITS: 5000 INJECTION, SOLUTION INTRAVENOUS; SUBCUTANEOUS at 08:04

## 2020-04-02 RX ADMIN — HEPARIN SODIUM 5000 UNITS: 5000 INJECTION, SOLUTION INTRAVENOUS; SUBCUTANEOUS at 05:04

## 2020-04-02 RX ADMIN — LABETALOL HYDROCHLORIDE 200 MG: 200 TABLET, FILM COATED ORAL at 09:04

## 2020-04-02 NOTE — SUBJECTIVE & OBJECTIVE
Interval History:  Hepatitis panel still pending.  Patient discussed with Dr. Hadley.  Plan for dialysis tomorrow and possible discharge afterwards.    Review of Systems   Constitutional: Negative for activity change, appetite change, chills, diaphoresis, fatigue and fever.   HENT: Negative for congestion, facial swelling and sore throat.    Eyes: Negative for pain and redness.   Respiratory: Negative for cough, shortness of breath and wheezing.    Cardiovascular: Negative for chest pain and palpitations.   Gastrointestinal: Negative for abdominal distention, abdominal pain, blood in stool, diarrhea, nausea and vomiting.   Endocrine: Negative for polydipsia and polyphagia.   Genitourinary: Negative for difficulty urinating, dysuria, flank pain and hematuria.        Patient reports he still voids   Musculoskeletal: Negative for neck pain and neck stiffness.   Skin: Negative for color change.   Allergic/Immunologic: Negative for food allergies.   Neurological: Negative for seizures, syncope, facial asymmetry, speech difficulty and weakness.   Hematological: Does not bruise/bleed easily.   Psychiatric/Behavioral: Negative for agitation, behavioral problems, confusion and dysphoric mood. The patient is not nervous/anxious.      Objective:     Vital Signs (Most Recent):  Temp: 96 °F (35.6 °C) (04/02/20 1046)  Pulse: 72 (04/02/20 1046)  Resp: 17 (04/02/20 1046)  BP: 127/78 (04/02/20 1046)  SpO2: 95 % (04/02/20 1046) Vital Signs (24h Range):  Temp:  [96 °F (35.6 °C)-98.3 °F (36.8 °C)] 96 °F (35.6 °C)  Pulse:  [60-87] 72  Resp:  [16-20] 17  SpO2:  [95 %-98 %] 95 %  BP: (102-132)/(59-85) 127/78     Weight: 119.7 kg (264 lb)  Body mass index is 36.82 kg/m².    Intake/Output Summary (Last 24 hours) at 4/2/2020 1233  Last data filed at 4/1/2020 1715  Gross per 24 hour   Intake 480 ml   Output 2100 ml   Net -1620 ml      Physical Exam   Constitutional: He is oriented to person, place, and time. He appears well-developed and  well-nourished. No distress.   HENT:   Head: Normocephalic and atraumatic.   Eyes: Pupils are equal, round, and reactive to light. Conjunctivae and EOM are normal. Right eye exhibits no discharge. Left eye exhibits no discharge.   Neck: Normal range of motion. Neck supple. No JVD present.   Cardiovascular: Intact distal pulses.   Pulmonary/Chest: Effort normal. No respiratory distress.   Abdominal: Soft. He exhibits no distension. There is no tenderness. There is no guarding.   Genitourinary:   Genitourinary Comments: Not examined   Musculoskeletal: Normal range of motion.   Neurological: He is alert and oriented to person, place, and time.   Skin: Skin is warm and dry. Capillary refill takes less than 2 seconds. He is not diaphoretic.   Chest dressing dry and intact   Psychiatric: He has a normal mood and affect. His behavior is normal. Judgment and thought content normal.   Nursing note and vitals reviewed.    Labs reviewed

## 2020-04-02 NOTE — PROGRESS NOTES
Progress Note  [facility]    Admit Date: 3/30/2020   LOS: 3 days      TELEMEDICINE VISIT  35 minute visit including d/w Zaria Ornelas NP, and > 50% Face-to-Face with patient    SUBJECTIVE:     Follow-up For:  ESRD    Seen via telemedicine with Vijay, the dialysis nurse.  The patient denies any complaints.  Remains in the hospital because of anti-HBsAg not being drawn.    Review of Systems:  GENERAL: NO fever, chills, night sweats, decreased intake  HEENT: NO blurry vision, glasses, floaters, hearing defects, sore throat  CV:  NO CP, Palpitations, Edema, Arrhythmias  PULM:  NO Cough, SOB, IBARRA, Hemoptysis, PND, Orthopnea  GI:  NO Nausea, Vomiting, Diarrhea, BRBPR, Melena, Abdominal Pain  :  NO Dysuria, Frequency, Urgency, Hematuria  NEURO: NO LOC, Seizure activity, Dizziness  SKIN:  NO Rash, Pruritis, Petechiae  MS:  NO Arthralgia, Muscle Aches, Arthritis      OBJECTIVE:     Vital Signs (Most Recent)  Temp: 96 °F (35.6 °C) (04/02/20 1046)  Pulse: 72 (04/02/20 1046)  Resp: 17 (04/02/20 1046)  BP: 127/78 (04/02/20 1046)  SpO2: 95 % (04/02/20 1046)    Vital Signs Range (Last 24H):  Temp:  [96 °F (35.6 °C)-98.3 °F (36.8 °C)]   Pulse:  [60-87]   Resp:  [16-20]   BP: (102-132)/(59-85)   SpO2:  [95 %-98 %]     I/O last 3 completed shifts:  In: 720 [P.O.:720]  Out: 2100 [Urine:600; Other:1500]    Physical Exam:   Incomplete exam because of telemedicine visit.  The patient is awake and alert and in no distress..  The nurse also reports no peripheral edema.    Laboratory:  Recent Labs   Lab 04/02/20  0528   HGB 11.0*   HCT 35.3*   WBC 6.68          Recent Labs   Lab 03/27/20  1100 03/30/20  1703 03/31/20  0506 04/01/20  0503 04/02/20  0528    139 140 137  137 137   K 6.1* 5.3* 5.2* 5.0  5.0 4.3    110 111* 106  106 103   CO2 19* 19* 19* 18*  18* 22*   BUN 64* 67* 70* 50*  50* 42*   CREATININE 9.5* 9.1* 9.1* 7.7*  7.7* 7.1*   GLU 94 103 86 86  86 88   CALCIUM 9.0 9.0 8.8 9.3   9.3 8.9   PHOS 4.7* 4.3  --  5.9*  --        Lab Results   Component Value Date    .0 (H) 03/27/2020    CALCIUM 8.9 04/02/2020    PHOS 5.9 (H) 04/01/2020     Problem List:    Active Hospital Problems    Diagnosis  POA    *ESRD (end stage renal disease) [N18.6]  Yes    Hypertensive renal disease, malignant, stage 5 chronic kidney disease or end stage renal disease [I12.0]  Yes    Anemia of chronic kidney failure, stage 5 [N18.5, D63.1]  Yes    Class 2 severe obesity with serious comorbidity in adult [E66.01]  Yes    Metabolic acidosis [E87.2]  Yes    Secondary hyperparathyroidism [N25.81]  Yes      Resolved Hospital Problems    Diagnosis Date Resolved POA    Hyperkalemia [E87.5] 04/01/2020 Yes       Nephrology Assessment/Plan:    1.  ESRD  Patient feeling better and no clinical issues  Tunneled catheter in place and functioning well  I have discussed patient with nurse manager at [facility] and they will accept the patient on 2/7  Hospital medicine and I agree on dialysis tomorrow and discharge afterwards     2.  HTN  Good control on minimal antihypertensives  Volume is good    3.  Hyper K+  Resolved with dialysis  Renal diet     4.  NAGMA  Resolving with dialysis  Follow-up with outpatient dialysis     5.  Sec HPT  Plan to start binders and calcitriol outpatient  Monitor phosphorus outpatient    6.  Anemia  Patient is at his ESRD goal  Give maintenance EPO with dialysis    Roger Hadley M.D.

## 2020-04-02 NOTE — RESPIRATORY THERAPY
04/02/20 0823   PRE-TX-O2   O2 Device (Oxygen Therapy) room air   SpO2 97 %   Pulse Oximetry Type Intermittent   $ Pulse Oximetry - Multiple Charge Pulse Oximetry - Multiple   Pulse 87   Resp 18

## 2020-04-02 NOTE — PROGRESS NOTES
Patient discussed with Dr. Hadley.  Awaiting pending labs.  Plan for dialysis tomorrow and possible discharge afterwards.

## 2020-04-02 NOTE — ASSESSMENT & PLAN NOTE
Orders as per Nephrology-patient discussed with Dr. Hadley and plan is for dialysis tomorrow with possible discharge afterwards  HemoSplit placed by Dr. Espinosa  Case management consulted to assist with setting up outpatient hemodialysis  Hepatitis panel pending

## 2020-04-02 NOTE — PLAN OF CARE
PT AAO X4. PT able to verbalize needs/want.Plan of care reviewed with patient at the beginning of the shift. Patient verbalized understanding. Patient ambulates independently.  socks maintained. Patient denies any pain at this time. Limb alert to left arm. Patient has remained free from fall/injury. Side rails up X2, bed in lowest, locked position, needs attended to , call light within reach will continue to monitor.

## 2020-04-02 NOTE — PLAN OF CARE
CM unable to get confirmed dialysis chair time for this pt today due to needed lab being cancelled (hep b surface antibody). CM updated CARLOS A Enciso and asked to reorder. Needed lab reordered on 4/2 0939. CM following.     1530- Hep b surface antibody resulted. CM faxed needed labs to [facility].     1535- CM received fax confirmation. CM called Moraima with [facility] ([phone] ext [phone]). Moraima was not available CM left message with next available rep  to update her of labs resulted and faxed.        04/02/20 0946   Post-Acute Status   Post-Acute Authorization Dialysis   Diaylsis Status (!) Delay in Lab

## 2020-04-02 NOTE — ASSESSMENT & PLAN NOTE
Monitor  Titrate medications as needed  Will utilize p.r.n. blood pressure medication only if patient's blood pressure greater than  180/110 and he develops symptoms such as worsening chest pain or shortness of breath.

## 2020-04-02 NOTE — ASSESSMENT & PLAN NOTE
Body mass index is 36.82 kg/m².  General weight loss/lifestyle modification strategies discussed (elicit support from others; identify saboteurs; non-food rewards, etc).

## 2020-04-02 NOTE — RESPIRATORY THERAPY
04/01/20 2007   Patient Assessment/Suction   Level of Consciousness (AVPU) alert   PRE-TX-O2   O2 Device (Oxygen Therapy) room air   SpO2 98 %   Pulse Oximetry Type Intermittent

## 2020-04-02 NOTE — PROGRESS NOTES
[facility] Hospital Medicine  Progress Note    Patient Name: Misael Owens  MRN: [id number]  Patient Class: IP- Inpatient   Admission Date: 3/30/2020  Length of Stay: 3 days  Attending Physician: Crystal Palacio MD  Primary Care Provider: Katharina Ricci MD    Subjective:     Principal Problem:ESRD (end stage renal disease) now with newly started hemodialysis      HPI:  Misael Owens is a 50 y.o. male with PMH of chronic kidney disease stage 5, hypertension, morbid obesity and hyperparathyroidism, renal etiology who presents to ED for initiation of hemodialysis.  He had laboratory data which demonstrated a potassium of 6.1 and creatinine of 9.5.  Patient is status post AV fistula placement on 2/20/2020 and transposition, vein, basilic on 3/2/2020.  He denies shortness of breath chest pain fever or chills.     Overview/Hospital Course:  Patient monitor closely during hospitalization.  He was noted to have worsening of renal function and hyperkalemia upon admit.  He has been admitted for initiation of hemodialysis.  Nephrology and vascular surgery consulted.  He underwent placement for HemoSplit on 03/31/2020.  Patient tolerated procedure well.  Case management was consulted to assist with discharge planning needs. Hepatitis panel was ordered.      Interval History:  Hepatitis panel still pending.  Patient discussed with Dr. Hadley.  Plan for dialysis tomorrow and possible discharge afterwards.    Review of Systems   Constitutional: Negative for activity change, appetite change, chills, diaphoresis, fatigue and fever.   HENT: Negative for congestion, facial swelling and sore throat.    Eyes: Negative for pain and redness.   Respiratory: Negative for cough, shortness of breath and wheezing.    Cardiovascular: Negative for chest pain and palpitations.   Gastrointestinal: Negative for abdominal distention, abdominal pain, blood in stool, diarrhea, nausea and vomiting.   Endocrine: Negative for  polydipsia and polyphagia.   Genitourinary: Negative for difficulty urinating, dysuria, flank pain and hematuria.        Patient reports he still voids   Musculoskeletal: Negative for neck pain and neck stiffness.   Skin: Negative for color change.   Allergic/Immunologic: Negative for food allergies.   Neurological: Negative for seizures, syncope, facial asymmetry, speech difficulty and weakness.   Hematological: Does not bruise/bleed easily.   Psychiatric/Behavioral: Negative for agitation, behavioral problems, confusion and dysphoric mood. The patient is not nervous/anxious.      Objective:     Vital Signs (Most Recent):  Temp: 96 °F (35.6 °C) (04/02/20 1046)  Pulse: 72 (04/02/20 1046)  Resp: 17 (04/02/20 1046)  BP: 127/78 (04/02/20 1046)  SpO2: 95 % (04/02/20 1046) Vital Signs (24h Range):  Temp:  [96 °F (35.6 °C)-98.3 °F (36.8 °C)] 96 °F (35.6 °C)  Pulse:  [60-87] 72  Resp:  [16-20] 17  SpO2:  [95 %-98 %] 95 %  BP: (102-132)/(59-85) 127/78     Weight: 119.7 kg (264 lb)  Body mass index is 36.82 kg/m².    Intake/Output Summary (Last 24 hours) at 4/2/2020 1233  Last data filed at 4/1/2020 1715  Gross per 24 hour   Intake 480 ml   Output 2100 ml   Net -1620 ml      Physical Exam   Constitutional: He is oriented to person, place, and time. He appears well-developed and well-nourished. No distress.   HENT:   Head: Normocephalic and atraumatic.   Eyes: Pupils are equal, round, and reactive to light. Conjunctivae and EOM are normal. Right eye exhibits no discharge. Left eye exhibits no discharge.   Neck: Normal range of motion. Neck supple. No JVD present.   Cardiovascular: Intact distal pulses.   Pulmonary/Chest: Effort normal. No respiratory distress.   Abdominal: Soft. He exhibits no distension. There is no tenderness. There is no guarding.   Genitourinary:   Genitourinary Comments: Not examined   Musculoskeletal: Normal range of motion.   Neurological: He is alert and oriented to person, place, and time.   Skin:  Skin is warm and dry. Capillary refill takes less than 2 seconds. He is not diaphoretic.   Chest dressing dry and intact   Psychiatric: He has a normal mood and affect. His behavior is normal. Judgment and thought content normal.   Nursing note and vitals reviewed.    Labs reviewed      Assessment/Plan:      * ESRD (end stage renal disease)  Orders as per Nephrology-patient discussed with Dr. Hadley and plan is for dialysis tomorrow with possible discharge afterwards  HemoSplit placed by Dr. Espinosa  Case management consulted to assist with setting up outpatient hemodialysis  Hepatitis panel pending      Metabolic acidosis  Orders as per Nephrology- home sodium bicarb discontinued    Class 2 severe obesity with serious comorbidity in adult  Body mass index is 36.82 kg/m².  General weight loss/lifestyle modification strategies discussed (elicit support from others; identify saboteurs; non-food rewards, etc).      Anemia of chronic kidney failure, stage 5  Patient's anemia is currently controlled. Has not received any PRBCs to date.. Etiology likely d/t renal disease  Current CBC reviewed-   Lab Results   Component Value Date    HGB 11.0 (L) 04/02/2020    HCT 35.3 (L) 04/02/2020     Monitor serial CBC and transfuse if patient becomes hemodynamically unstable, symptomatic or H/H drops below 7/21.         Hypertensive renal disease, malignant, stage 5 chronic kidney disease or end stage renal disease  Monitor  Titrate medications as needed  Will utilize p.r.n. blood pressure medication only if patient's blood pressure greater than  180/110 and he develops symptoms such as worsening chest pain or shortness of breath.        Secondary hyperparathyroidism  Chronic resume home vitamin-D analogs.  Defer to Nephrology.        VTE Risk Mitigation (From admission, onward)         Ordered     heparin (porcine) injection 4,000 Units  As needed (PRN)      04/01/20 1622     heparin (porcine) injection 5,000 Units  Every 8 hours       03/30/20 2241     IP VTE HIGH RISK PATIENT  Once      03/30/20 2241                Time spent seeing patient( greater than 1/2 spent in direct contact) :  38 minutes      JORDAN Echols  Department of Hospital Medicine   [facility]

## 2020-04-02 NOTE — PLAN OF CARE
NAD noted. POC reviewed w pt and they verbalized understanding.   Pt free from falls.  Pt ambulated to the bathroom.  Bed in low position, side rails up X2, bed alarm on, wheels locked, call light in reach. Will continue to monitor.

## 2020-04-02 NOTE — ASSESSMENT & PLAN NOTE
Patient's anemia is currently controlled. Has not received any PRBCs to date.. Etiology likely d/t renal disease  Current CBC reviewed-   Lab Results   Component Value Date    HGB 11.0 (L) 04/02/2020    HCT 35.3 (L) 04/02/2020     Monitor serial CBC and transfuse if patient becomes hemodynamically unstable, symptomatic or H/H drops below 7/21.

## 2020-04-03 VITALS
WEIGHT: 264 LBS | TEMPERATURE: 98 F | HEART RATE: 70 BPM | RESPIRATION RATE: 18 BRPM | SYSTOLIC BLOOD PRESSURE: 126 MMHG | HEIGHT: 71 IN | DIASTOLIC BLOOD PRESSURE: 76 MMHG | BODY MASS INDEX: 36.96 KG/M2 | OXYGEN SATURATION: 100 %

## 2020-04-03 PROCEDURE — 63600175 PHARM REV CODE 636 W HCPCS: Mod: NTX | Performed by: INTERNAL MEDICINE

## 2020-04-03 PROCEDURE — 90935 HEMODIALYSIS ONE EVALUATION: CPT | Mod: NTX

## 2020-04-03 PROCEDURE — 63600175 PHARM REV CODE 636 W HCPCS: Mod: NTX | Performed by: THORACIC SURGERY (CARDIOTHORACIC VASCULAR SURGERY)

## 2020-04-03 PROCEDURE — 25000003 PHARM REV CODE 250: Mod: NTX | Performed by: THORACIC SURGERY (CARDIOTHORACIC VASCULAR SURGERY)

## 2020-04-03 RX ADMIN — HEPARIN SODIUM 5000 UNITS: 5000 INJECTION, SOLUTION INTRAVENOUS; SUBCUTANEOUS at 02:04

## 2020-04-03 RX ADMIN — LABETALOL HYDROCHLORIDE 200 MG: 200 TABLET, FILM COATED ORAL at 08:04

## 2020-04-03 RX ADMIN — HEPARIN SODIUM 4000 UNITS: 1000 INJECTION, SOLUTION INTRAVENOUS; SUBCUTANEOUS at 06:04

## 2020-04-03 RX ADMIN — EPOETIN ALFA-EPBX 8000 UNITS: 4000 INJECTION, SOLUTION INTRAVENOUS; SUBCUTANEOUS at 06:04

## 2020-04-03 RX ADMIN — HEPARIN SODIUM 5000 UNITS: 5000 INJECTION, SOLUTION INTRAVENOUS; SUBCUTANEOUS at 05:04

## 2020-04-03 NOTE — PLAN OF CARE
KAREN attempted to contact [facility]- Moraima not available. CM spoke with next available representative, Francisca. Francisca verified that they received hep b panel yesterday evening. Francisca also reached out to [facility] to verify that pt has been approved by clinic director to start. Pt has confirmed chair time for T,TH,SAT 11 AM starting on the 7th (Tues). [facility]. [facility] In [facility] blue folder[facility]        04/03/20 0921   Post-Acute Status   Post-Acute Authorization Dialysis   Diaylsis Status Set-up Complete

## 2020-04-03 NOTE — PLAN OF CARE
04/03/20 1425   Final Note   Assessment Type Final Discharge Note   Anticipated Discharge Disposition Home   Hospital Follow Up  Appt(s) scheduled? Yes

## 2020-04-03 NOTE — PROGRESS NOTES
Progress Note  [facility]    Admit Date: 3/30/2020   LOS: 4 days      TELEMEDICINE VISIT  35 minute visit including d/w floor nurse and dialysis nurse and > 50% Face-to-Face with patient    SUBJECTIVE:     Follow-up For:  ESRD    Seen via telemedicine with Isabela, the dialysis nurse.  The patient denies any complaints.  Patient is set up for dialysis at [facility] starting Tuesday, 4/7/2020.    Review of Systems:  GENERAL: NO fever, chills, night sweats, decreased intake  HEENT: NO blurry vision, glasses, floaters, hearing defects, sore throat  CV:  NO CP, Palpitations, Edema, Arrhythmias  PULM:  NO Cough, SOB, IBARRA, Hemoptysis, PND, Orthopnea  GI:  NO Nausea, Vomiting, Diarrhea, BRBPR, Melena, Abdominal Pain  :  NO Dysuria, Frequency, Urgency, Hematuria  NEURO: NO LOC, Seizure activity, Dizziness  SKIN:  NO Rash, Pruritis, Petechiae  MS:  NO Arthralgia, Muscle Aches, Arthritis      OBJECTIVE:     Vital Signs (Most Recent)  Temp: 97.9 °F (36.6 °C) (04/03/20 1520)  Pulse: 69 (04/03/20 1630)  Resp: 18 (04/03/20 1520)  BP: 115/70 (04/03/20 1630)  SpO2: 100 % (04/03/20 1520)    Vital Signs Range (Last 24H):  Temp:  [96.8 °F (36 °C)-97.9 °F (36.6 °C)]   Pulse:  [69-80]   Resp:  [16-18]   BP: (109-131)/(57-88)   SpO2:  [96 %-100 %]     I/O last 3 completed shifts:  In: 1195 [P.O.:1195]  Out: -     Physical Exam:   Incomplete exam because of telemedicine visit.  The patient is on dialysis and doing well.  The patient is awake and alert and in no distress.  Appears very comfortable in bed.  A peripheral edema present according to the nurse.    Laboratory:  Recent Labs   Lab 04/02/20  0528   HGB 11.0*   HCT 35.3*   WBC 6.68          Recent Labs   Lab 03/30/20  1703 03/31/20  0506 04/01/20  0503 04/02/20  0528    140 137  137 137   K 5.3* 5.2* 5.0  5.0 4.3    111* 106  106 103   CO2 19* 19* 18*  18* 22*   BUN 67* 70* 50*  50* 42*   CREATININE 9.1* 9.1* 7.7*  7.7* 7.1*     86 86  86 88   CALCIUM 9.0 8.8 9.3  9.3 8.9   PHOS 4.3  --  5.9*  --        Lab Results   Component Value Date    .0 (H) 03/27/2020    CALCIUM 8.9 04/02/2020    PHOS 5.9 (H) 04/01/2020     Problem List:    Active Hospital Problems    Diagnosis  POA    *ESRD (end stage renal disease) [N18.6]  Yes    Hypertensive renal disease, malignant, stage 5 chronic kidney disease or end stage renal disease [I12.0]  Yes    Anemia of chronic kidney failure, stage 5 [N18.5, D63.1]  Yes    Class 2 severe obesity with serious comorbidity in adult [E66.01]  Yes    Metabolic acidosis [E87.2]  Yes    Secondary hyperparathyroidism [N25.81]  Yes      Resolved Hospital Problems    Diagnosis Date Resolved POA    Hyperkalemia [E87.5] 04/01/2020 Yes       Nephrology Assessment/Plan:    1.  ESRD  Patient feeling better and no clinical issues  Tunneled catheter in place and functioning well  The patient is having dialysis now and will have his next dialysis on 4/7/2020 at [facility]  I cautioned the patient to monitor his fluids and restrict as much as possible     2.  HTN  Good control on minimal antihypertensives  Volume is good    3.  Hyper K+  Resolved with dialysis  Renal diet     4.  NAGMA  Resolving with dialysis  Follow-up with outpatient dialysis     5.  Sec HPT  Plan to start binders and calcitriol outpatient  Monitor phosphorus outpatient    6.  Anemia  Patient is at his ESRD goal  Give maintenance EPO with dialysis    Roger Hadley M.D.

## 2020-04-03 NOTE — PROGRESS NOTES
HD:  3 hour tx complete, lines reinfused, catheter capped and clamped, dressing changed. Pt tolerated tx well.      NET UF:  2000 mL

## 2020-04-03 NOTE — PLAN OF CARE
Spoke with Dr. Hadley- he is ok with patient being d/c today, missing HD 4/4/20 and starting outpt HD on 4/7/20.

## 2020-04-04 NOTE — NURSING
Pt arrived back to floor from dialysis. Pt alert and oriented. Able to make needs and wants known. Pt transported via wheelchair. Ambulated to the bed with slight shifts in gait. Pt states a friend is waiting downstairs to drive him home. Iv removed, Tip intact, tolerated well. Transported to car via wheelchair.

## 2020-04-06 ENCOUNTER — PATIENT OUTREACH (OUTPATIENT)
Dept: ADMINISTRATIVE | Facility: CLINIC | Age: 50
End: 2020-04-06

## 2020-04-06 ENCOUNTER — TELEPHONE (OUTPATIENT)
Dept: INTERNAL MEDICINE | Facility: CLINIC | Age: 50
End: 2020-04-06

## 2020-04-06 NOTE — PATIENT INSTRUCTIONS
Discharge Instructions for Chronic Kidney Disease (CKD)  Chronic kidney disease can (CKD) happen because of many things. These include infections, diabetes, high blood pressure, kidney stones, circulation problems, and reactions to medicine. Having kidney disease means making many changes in your life. Learn as much as you can about it so that you can better adjust to these changes. It is important to remember that the main goal of treatment is to stop CKD from progressing to complete kidney failure. Treatments may vary based on the progression of CKD. Always follow your healthcare provider's instructions on how to manage your condition.  Here are some things you can do to help your condition.  Diet changes  Always discuss your diet with your healthcare provider before making any changes.  Salt (sodium) in your diet  · Based on your condition, you may be told to eat 1,500 mg or less of sodium daily  · Limit processed foods such as:  ¨ Frozen dinners and packaged meals  ¨ Canned fish and meats  ¨ Pickled foods  ¨ Salted snacks  ¨ Lunch meats  ¨ Sauces  ¨ Most cheeses  ¨ Fast foods  · Don't add salt to your food while cooking or before eating at the table.  · Eat unprocessed foods to lower the sodium, such as:  ¨ Fresh turkey and chicken  ¨ Lean beef  ¨ Unsalted tuna  ¨ Fresh fish  ¨ Fresh vegetables and fruits  · Season foods with fresh herbs, garlic, onions, citrus, flavored vinegar, and sodium-free spice blends instead of salt when cooking.  · Don't use salt substitutes that are high in potassium. Ask your healthcare provider or a registered dietitian which salt substitutes to use.  · Avoiding drinking softened water, because of the sodium content. Make sure to read the label on bottled water for sodium content.  · Avoid over-the-counter medicines that contain sodium bicarbonate or sodium carbonate. Read labels carefully.  Potassium in your diet   · Based on your condition, you may be told to eat less than 1,500  mg to 2,700 mg of potassium daily.  · Always drain canned foods such as vegetables, fruits, and meats before serving.  · Avoid whole-grain breads, wheat bran, and granolas.  · Avoid milk, buttermilk, and yogurt.  · Avoid nuts, seeds, peanut butter, dried beans, and peas.  · Avoid fig cookies, chocolate, and molasses.  · Don't use salt substitutes that are high in potassium. Ask your healthcare provider or a registered dietitian which salt substitutes to use.  Protein in your diet  · Based on your condition, your healthcare provider will talk with you about why you should limit protein in your diet.  · Cut back on protein. Eat less meat, milk products, yogurt, eggs, and cheese.  Phosphorus in your diet  · Avoid beer, cocoa, dark bernard, myranda, chocolate drinks, and canned ice teas.  · Avoid cheese, milk, ice cream, pudding, and yogurt.  · Avoid liver (beef, chicken), organ meats, oysters, crayfish, and sardines.  · Avoid beans (soy, kidney, black, garbanzo, and northern), peas (chick and split), bran cereals, nuts, and caramels.  Eat small meals often that are high in fiber and calories. You may be told to limit how much fluid you drink.  Other home care  · Avoid wearing yourself out or becoming overly fatigued.  · Get plenty of rest and get more sleep at night.  · Move around and bend your legs to avoid getting blood clots when you rest for a long period of time.  · Weigh yourself every day. Do this at the same time of day and in the same kind of clothes. Keep a record of your daily weights.  · Take your medicines exactly as directed.  · Keep all medical appointments.  · Take steps to control high blood pressure or diabetes. Talk with your healthcare provider for advice.  · Talk with your healthcare provider about dialysis. This procedure may help if your chronic kidney disease is progressing to end stage renal disease.  Follow-up care  Follow up with your healthcare provider, or as advised.     When to seek medical  care  Call your healthcare provider right away if you have any of the following:  · Trouble eating or drinking  · Weight loss of more than 2 pounds in 24 hours or more than 5 pounds in 7 days  · Little or no urine output  · Trouble breathing  · Muscle aches  · Fever of 100.4°F (38°C) or higher, or as advised by your provider  · Blood in your urine or stool  · Bloody discharge from your nose, mouth, or ears  · Severe headache or a seizure  · Vomiting  · Swelling of legs or ankles  Call 911  Call 911 if you have chest pain   Date Last Reviewed: 2/1/2017  © 6210-1740 [employer]. [address] [place] [address], [place] [address]. All rights reserved. This information is not intended as a substitute for professional medical care. Always follow your healthcare professional's instructions.

## 2020-04-07 ENCOUNTER — TELEPHONE (OUTPATIENT)
Dept: MEDSURG UNIT | Facility: HOSPITAL | Age: 50
End: 2020-04-07

## 2020-04-07 ENCOUNTER — PATIENT MESSAGE (OUTPATIENT)
Dept: INTERNAL MEDICINE | Facility: CLINIC | Age: 50
End: 2020-04-07

## 2020-04-14 ENCOUNTER — TELEPHONE (OUTPATIENT)
Dept: VASCULAR SURGERY | Facility: CLINIC | Age: 50
End: 2020-04-14

## 2020-04-14 NOTE — TELEPHONE ENCOUNTER
Left message for the pt to call the clinic. Pt lives in [place] and the vascular wanted to confirm if pt will attend this appointment .

## 2020-04-15 ENCOUNTER — OFFICE VISIT (OUTPATIENT)
Dept: INTERNAL MEDICINE | Facility: CLINIC | Age: 50
End: 2020-04-15
Payer: MEDICAID

## 2020-04-15 ENCOUNTER — PATIENT MESSAGE (OUTPATIENT)
Dept: VASCULAR SURGERY | Facility: CLINIC | Age: 50
End: 2020-04-15

## 2020-04-15 DIAGNOSIS — R25.2 CRAMPS, MUSCLE, GENERAL: ICD-10-CM

## 2020-04-15 DIAGNOSIS — I10 ESSENTIAL HYPERTENSION: ICD-10-CM

## 2020-04-15 DIAGNOSIS — N18.6 ESRD (END STAGE RENAL DISEASE): ICD-10-CM

## 2020-04-15 DIAGNOSIS — Z09 HOSPITAL DISCHARGE FOLLOW-UP: Primary | ICD-10-CM

## 2020-04-15 PROCEDURE — 99213 PR OFFICE/OUTPT VISIT, EST, LEVL III, 20-29 MIN: ICD-10-PCS | Mod: 95,,, | Performed by: STUDENT IN AN ORGANIZED HEALTH CARE EDUCATION/TRAINING PROGRAM

## 2020-04-15 PROCEDURE — 99213 OFFICE O/P EST LOW 20 MIN: CPT | Mod: 95,,, | Performed by: STUDENT IN AN ORGANIZED HEALTH CARE EDUCATION/TRAINING PROGRAM

## 2020-04-15 NOTE — PROGRESS NOTES
"Clinic Note  04/15/2020    This service was provided through telemedicine.  Start time:1449  Chief complaint: follow up  The patient location is home  End time:  1511  Total time spent with patient: 22 minutes  I have assessed these findings virtually using telemed platform.  This evaluation, treatment, and documentation was performed per Merritt Lofton MD via audiovisual.  Each patient to whom he or she provides medical services by telemedicine is:  (1) informed of the relationship between the physician and patient and the respective role of any other health care provider with respect to management of the patient; and (2) notified that he or she may decline to receive medical services by telemedicine and may withdraw from such care at any time.      Subjective:       Patient ID: Misael Owens is a 50 y.o. male being seen for a virtual visit.    50 year old M with HTN, ESRD (TTS) complicated by anemia, VitD deficiency and hyperparathyroid deficiency presenting for virtual visit for hospital follow up where patient was admitted for initiation of HD. Patient had a right upper chest placed and HATTIE extremity fistula placed during that admission. He has been getting HD on TThS and has been experiencing cramps in his legs and abdomen starting during his dialysis treatments that can last up to 48 hours. He further reports that he has his BP checked at every HD session and although he does not know the value of his BP, he has been told "they are good". Patient has follow up scheduled with transplant nephrology to discuss a potential transplant, cardiology, and vascular surgery to evaluate his AVF for use.     Patient denies fevers, shortness of breath, cough, weakness, leg swelling, nausea, vomiting, diarrhea.       Review of Systems   Constitutional: Negative for chills, diaphoresis, fatigue and fever.   HENT: Negative.    Respiratory: Negative for cough and shortness of breath.    Cardiovascular: Negative for chest pain " and leg swelling.   Gastrointestinal: Positive for abdominal pain (cramps).   Genitourinary: Negative.    Musculoskeletal: Positive for myalgias (cramps, legs).        Cramps       Patient's Medications   New Prescriptions    No medications on file   Previous Medications    ACETAMINOPHEN (TYLENOL) 325 MG TABLET    Take 325 mg by mouth every 6 (six) hours as needed for Pain. Takes 1-2 tablets prn    CALCITRIOL (ROCALTROL) 0.5 MCG CAP    Take 1 capsule (0.5 mcg total) by mouth once daily.    ERGOCALCIFEROL (ERGOCALCIFEROL) 50,000 UNIT CAP    Take 1 capsule (50,000 Units total) by mouth every 7 days.    FUROSEMIDE (LASIX) 40 MG TABLET    Take 1 tablet (40 mg total) by mouth once daily.    LABETALOL (NORMODYNE) 200 MG TABLET    Take 200 mg by mouth every 12 (twelve) hours.    OXYCODONE-ACETAMINOPHEN (PERCOCET) 5-325 MG PER TABLET    Take 1 tablet by mouth every 6 (six) hours as needed for Pain.   Modified Medications    No medications on file   Discontinued Medications    No medications on file       Patient Active Problem List    Diagnosis Date Noted    Hypertensive renal disease, malignant, stage 5 chronic kidney disease or end stage renal disease 03/30/2020    Anemia of chronic kidney failure, stage 5 03/30/2020    Class 2 severe obesity with serious comorbidity in adult 03/30/2020    Metabolic acidosis 03/30/2020    ESRD (end stage renal disease) 03/02/2020    Chronic kidney disease (CKD) 01/07/2020    Preop examination     Secondary hyperparathyroidism 11/15/2019    Essential hypertension 11/15/2019    Vitamin D deficiency 03/09/2017           Objective:      Limited physical exam was performed for this virtual visit or deferred if audio-only visit    Physical Exam   Constitutional: He is oriented to person, place, and time. He appears well-developed and well-nourished. No distress.   HENT:   Head: Normocephalic and atraumatic.   Eyes: Conjunctivae are normal. No scleral icterus.   Pulmonary/Chest: No  respiratory distress.   RU chest dialysis port, dressing appears clean   Abdominal: There is tenderness (generalized (performed by patient)).   Musculoskeletal: He exhibits no edema.   LUE fistula, healing well   Neurological: He is alert and oriented to person, place, and time.   Skin: He is not diaphoretic.       Assessment and Plan:   Misael was seen today for follow-up.    Diagnoses and all orders for this visit:    Hospital discharge follow-up   - patient has appropriate follow up with kidney txp, cardiology, and vascular surgery  Cramps, muscle, general   - appears to be associated with HD, will be evaluated by HD center nephrologist  Essential hypertension   - no vitals today as virtual visit, checked TThSat for HD   - Labetolol, patient is compliant    ESRD (end stage renal disease)   - TThS HD   - Follow up with vasc surg in May for graft eval   - KTM follow up in place for May    Patient seen and plan of care discussed with Dr. Bharath Lofton MD

## 2020-04-21 NOTE — PROGRESS NOTES
I have reviewed the notes, assessments, and/or procedures performed by Dr. Lofton, I concur with his documentation of Misael Owens.

## 2020-05-04 ENCOUNTER — TELEPHONE (OUTPATIENT)
Dept: TRANSPLANT | Facility: CLINIC | Age: 50
End: 2020-05-04

## 2020-05-05 ENCOUNTER — TELEPHONE (OUTPATIENT)
Dept: TRANSPLANT | Facility: CLINIC | Age: 50
End: 2020-05-05

## 2020-05-06 ENCOUNTER — TELEPHONE (OUTPATIENT)
Dept: TRANSPLANT | Facility: CLINIC | Age: 50
End: 2020-05-06

## 2020-05-06 ENCOUNTER — HOSPITAL ENCOUNTER (OUTPATIENT)
Dept: RADIOLOGY | Facility: HOSPITAL | Age: 50
Discharge: HOME | End: 2020-05-06
Attending: NURSE PRACTITIONER
Payer: MEDICAID

## 2020-05-06 ENCOUNTER — OFFICE VISIT (OUTPATIENT)
Dept: TRANSPLANT | Facility: CLINIC | Age: 50
End: 2020-05-06
Payer: MEDICAID

## 2020-05-06 VITALS
WEIGHT: 226.88 LBS | BODY MASS INDEX: 33.6 KG/M2 | OXYGEN SATURATION: 97 % | HEIGHT: 69 IN | HEART RATE: 84 BPM | SYSTOLIC BLOOD PRESSURE: 122 MMHG | TEMPERATURE: 99 F | RESPIRATION RATE: 20 BRPM | DIASTOLIC BLOOD PRESSURE: 57 MMHG

## 2020-05-06 DIAGNOSIS — Z01.818 PRE-TRANSPLANT EVALUATION FOR CKD (CHRONIC KIDNEY DISEASE): ICD-10-CM

## 2020-05-06 DIAGNOSIS — N18.5 ANEMIA OF CHRONIC KIDNEY FAILURE, STAGE 5: ICD-10-CM

## 2020-05-06 DIAGNOSIS — Z76.82 ORGAN TRANSPLANT CANDIDATE: ICD-10-CM

## 2020-05-06 DIAGNOSIS — I12.0: ICD-10-CM

## 2020-05-06 DIAGNOSIS — N25.81 SECONDARY HYPERPARATHYROIDISM: ICD-10-CM

## 2020-05-06 DIAGNOSIS — E55.9 VITAMIN D DEFICIENCY: ICD-10-CM

## 2020-05-06 DIAGNOSIS — E87.20 METABOLIC ACIDOSIS: ICD-10-CM

## 2020-05-06 DIAGNOSIS — I10 ESSENTIAL HYPERTENSION: ICD-10-CM

## 2020-05-06 DIAGNOSIS — N18.6 ESRD ON DIALYSIS: Primary | Chronic | ICD-10-CM

## 2020-05-06 DIAGNOSIS — Z99.2 ESRD ON DIALYSIS: Primary | Chronic | ICD-10-CM

## 2020-05-06 DIAGNOSIS — D63.1 ANEMIA OF CHRONIC KIDNEY FAILURE, STAGE 5: ICD-10-CM

## 2020-05-06 LAB
BACTERIA #/AREA URNS AUTO: NORMAL /HPF
BILIRUB UR QL STRIP: NEGATIVE
CLARITY UR REFRACT.AUTO: CLEAR
COLOR UR AUTO: YELLOW
CREAT UR-MCNC: 200 MG/DL (ref 23–375)
GLUCOSE UR QL STRIP: NEGATIVE
HGB UR QL STRIP: ABNORMAL
HYALINE CASTS UR QL AUTO: 0 /LPF
KETONES UR QL STRIP: NEGATIVE
LEUKOCYTE ESTERASE UR QL STRIP: NEGATIVE
MICROSCOPIC COMMENT: NORMAL
NITRITE UR QL STRIP: NEGATIVE
PH UR STRIP: 5 [PH] (ref 5–8)
PROT UR QL STRIP: ABNORMAL
PROT UR-MCNC: 121 MG/DL (ref 0–15)
PROT/CREAT UR: 0.61 MG/G{CREAT} (ref 0–0.2)
RBC #/AREA URNS AUTO: 0 /HPF (ref 0–4)
SP GR UR STRIP: 1.01 (ref 1–1.03)
SQUAMOUS #/AREA URNS AUTO: 1 /HPF
URN SPEC COLLECT METH UR: ABNORMAL
WBC #/AREA URNS AUTO: 1 /HPF (ref 0–5)

## 2020-05-06 PROCEDURE — 76770 US EXAM ABDO BACK WALL COMP: CPT | Mod: 26,TXP,, | Performed by: RADIOLOGY

## 2020-05-06 PROCEDURE — 76770 US RETROPERITONEAL COMPLETE: ICD-10-PCS | Mod: 26,TXP,, | Performed by: RADIOLOGY

## 2020-05-06 PROCEDURE — 76770 US EXAM ABDO BACK WALL COMP: CPT | Mod: TC,TXP

## 2020-05-06 PROCEDURE — 72170 X-RAY EXAM OF PELVIS: CPT | Mod: TC,TXP

## 2020-05-06 PROCEDURE — 99205 PR OFFICE/OUTPT VISIT, NEW, LEVL V, 60-74 MIN: ICD-10-PCS | Mod: S$PBB,TXP,, | Performed by: INTERNAL MEDICINE

## 2020-05-06 PROCEDURE — 99244 OFF/OP CNSLTJ NEW/EST MOD 40: CPT | Mod: S$PBB,TXP,, | Performed by: SURGERY

## 2020-05-06 PROCEDURE — 99214 OFFICE O/P EST MOD 30 MIN: CPT | Mod: PBBFAC,25,TXP | Performed by: INTERNAL MEDICINE

## 2020-05-06 PROCEDURE — 99999 PR PBB SHADOW E&M-EST. PATIENT-LVL IV: CPT | Mod: PBBFAC,TXP,, | Performed by: INTERNAL MEDICINE

## 2020-05-06 PROCEDURE — 82570 ASSAY OF URINE CREATININE: CPT | Mod: TXP

## 2020-05-06 PROCEDURE — 99244 PR OFFICE CONSULTATION,LEVEL IV: ICD-10-PCS | Mod: S$PBB,TXP,, | Performed by: SURGERY

## 2020-05-06 PROCEDURE — 99999 PR PBB SHADOW E&M-EST. PATIENT-LVL IV: ICD-10-PCS | Mod: PBBFAC,TXP,, | Performed by: INTERNAL MEDICINE

## 2020-05-06 PROCEDURE — 99205 OFFICE O/P NEW HI 60 MIN: CPT | Mod: S$PBB,TXP,, | Performed by: INTERNAL MEDICINE

## 2020-05-06 PROCEDURE — 72170 X-RAY EXAM OF PELVIS: CPT | Mod: 26,TXP,, | Performed by: RADIOLOGY

## 2020-05-06 PROCEDURE — 72170 XR PELVIS ROUTINE AP: ICD-10-PCS | Mod: 26,TXP,, | Performed by: RADIOLOGY

## 2020-05-06 PROCEDURE — 81001 URINALYSIS AUTO W/SCOPE: CPT | Mod: TXP

## 2020-05-06 NOTE — PROGRESS NOTES
INITIAL PATIENT EDUCATION NOTE    Mr. Misael Owens was seen in pre-kidney transplant clinic for evaluation for kidney, kidney/pancreas or pancreas only transplant.  The patient attended a group education session that discussed/reviewed the following aspects of transplantation: evaluation and selection committee process, UNOS waitlist management/multiple listings, types of organs offered (KDPI < 85%, KDPI > 85%, PHS increased risk, DCD, HCV+, HIV+ for HIV+ recipients and enbloc/dual), financial aspects, surgical procedures, dietary instruction pre- and post-transplant, health maintenance pre- and post-transplant, post-transplant hospitalization and outpatient follow-up, potential to participate in a research protocol, and medication management and side effects.  A question and answer session was provided after the presentation.    The patient was seen by all members of the multi-disciplinary team to include: Nephrologist/PA, Surgeon, , Transplant Coordinator, , Pharmacist and Dietician (if applicable).    The patient reviewed and signed all consents for evaluation which were witnessed and sent to scanning into the [facility] chart.    The patient was given an education book and plan for further evaluation based on his individual assessment.      The patient was encouraged to call with any questions or concerns.

## 2020-05-06 NOTE — PROGRESS NOTES
"   Transplant Nephrology  Kidney Transplant Recipient Evaluation    Referring Physician: Matthew Moreno  Current Nephrologist: Matthew Moreno    Subjective:   CC:  Initial evaluation of kidney transplant candidacy.    HPI:  Mr. Owens is a 50 y.o. year old Black or  male who has presented to be evaluated as a potential kidney transplant recipient.  He has ESRD presumed secondary to HTN. He denies foamy urine. He states he had native kidney biopsy in ~2016 at [facility] in [place] but patient just states that they told him the kidney dysfunction was related to the food that he was eating. Patient is currently on hemodialysis started on 3/31/20. Patient is dialyzing on TTS schedule.  Patient reports that he is tolerating dialysis well.. He has a dialysis catheter for dialysis access. He has LUE AVF created and states they just started using AVF with one needle a week ago. He dialyzes for 4 hours and will have hypotension but he doesn't recall what the values are. He doesn't recall what his dry weight is. He urinates ~4-6 times a day. He has never had blood transfusion but would be agreeable accepting blood products "if the blood is clean/good"    He was diagnosed with HTN in 2015. States his home BPs are in the 120-140s/50-60s.     He denies history of DM, MI, CVA, cancer, PE/DVT.     Previous Transplant: no    Functional Status: Patient lives with his good friend (Corby Lynch) in a 2nd floor apartment with 10-12 steps to get in. He will do household chores - cooking, cleaning, laundry, ironing, mopping the floor, grocery shopping. With the activity that he does he denies chest pain or claudication. He will have some IBARRA if he does too much. States he can walk a great distance without SOB. Denies falls. Denies using cane, walker, scooter, wheelchair.       Past Medical History:  Past Medical History:   Diagnosis Date    CKD (chronic kidney disease), stage IV     due to hypertension "    ESRD on dialysis     Hypertension     Secondary hyperparathyroidism     Vitamin D deficiency        Past Surgical History:   Past Surgical History:   Procedure Laterality Date    AV FISTULA PLACEMENT Left 1/7/2020    Procedure: CREATION, AV FISTULA, brachiobasillic;  Surgeon: Derrek Grant MD;  [facility];  Service: Peripheral Vascular;  Laterality: Left;    FISTULOGRAM Left 2/4/2020    Procedure: Fistulogram;  Surgeon: Derrek Grant MD;  [facility];  Service: Peripheral Vascular;  Laterality: Left;  Fluro: 2.7 min  mGy: 23.74  contrast : 17ml    INSERTION OF DIALYSIS CATHETER  3/31/2020    Procedure: INSERTION, CATHETER, DIALYSIS;  Surgeon: Lm Espinosa MD;  Location[facility];  Service: General;;    INSERTION OF DIALYSIS CATHETER      RENAL BIOPSY      TRANSPOSITION OF BASILIC VEIN Left 3/2/2020    Procedure: TRANSPOSITION, VEIN, BASILIC;  Surgeon: Derrek Grant MD;  Location: [facility] OR [address] [place] [address];  Service: Peripheral Vascular;  Laterality: Left;       Medications:   Current Outpatient Medications   Medication Sig Dispense Refill    acetaminophen (TYLENOL) 325 MG tablet Take 325 mg by mouth every 6 (six) hours as needed for Pain. Takes 1-2 tablets prn      calcitRIOL (ROCALTROL) 0.5 MCG Cap Take 1 capsule (0.5 mcg total) by mouth once daily. (Patient taking differently: Take 0.5 mcg by mouth every morning. ) 30 capsule 3    ergocalciferol (ERGOCALCIFEROL) 50,000 unit Cap Take 1 capsule (50,000 Units total) by mouth every 7 days. (Patient taking differently: Take 50,000 Units by mouth every 7 days. Takes on Saturdays) 12 capsule 3    furosemide (LASIX) 40 MG tablet Take 1 tablet (40 mg total) by mouth once daily. 90 tablet 3    labetalol (NORMODYNE) 200 MG tablet Take 200 mg by mouth every 12 (twelve) hours.  4     No current facility-administered medications for this visit.      Facility-Administered Medications Ordered in Other Visits    Medication Dose Route Frequency Provider Last Rate Last Dose    0.9%  NaCl infusion   Intravenous Continuous Jay Sherman MD   Stopped at 03/31/20 1502    ceFAZolin injection 2 g  2 g Intravenous On Call Procedure Cherry Ocampo MD        lidocaine (PF) 10 mg/ml (1%) injection 10 mg  1 mL Intradermal Once Jay Sherman MD        mupirocin 2 % ointment   Nasal On Call Procedure Cherry Ocampo MD        ondansetron injection 4 mg  4 mg Intravenous Q12H PRN Jay Sherman MD        sodium chloride 0.9% flush 10 mL  10 mL Intravenous PRN Cherry Ocampo MD           Social History:  Social History     Socioeconomic History    Marital status: Single     Spouse name: Not on file    Number of children: Not on file    Years of education: Not on file    Highest education level: Not on file   Occupational History    Not on file   Social Needs    Financial resource strain: Not on file    Food insecurity:     Worry: Not on file     Inability: Not on file    Transportation needs:     Medical: Not on file     Non-medical: Not on file   Tobacco Use    Smoking status: Never Smoker    Smokeless tobacco: Never Used   Substance and Sexual Activity    Alcohol use: No    Drug use: No    Sexual activity: Not on file   Lifestyle    Physical activity:     Days per week: Not on file     Minutes per session: Not on file    Stress: Not on file   Relationships    Social connections:     Talks on phone: Not on file     Gets together: Not on file     Attends Denominational service: Not on file     Active member of club or organization: Not on file     Attends meetings of clubs or organizations: Not on file     Relationship status: Not on file   Other Topics Concern    Not on file   Social History Narrative    Not on file       Family History:   Family History   Problem Relation Age of Onset    Hypertension Mother     Diabetes Mother     Cancer Father     Lung cancer Maternal Uncle     Stroke Neg Hx      "Heart attack Neg Hx        Review of Systems  Constitutional: +cold easily; +fatigue; Denies fever/chills, night sweats  EENT: +wears reading glasses; Denies hearing problems, trouble swallowing.   Respiratory: +IBARRA as mentioned; Denies orthopnea, wheezing, hemoptysis, denies known TB exposure or history of positive TB skin test  Cardiovascular: Denies chest pain, palpitations, history of MI, history of stroke, history of DVT  Gastrointestinal: Denies abdominal pain, nausea/vomiting/diarrhea/constipation. Denies history of GI bleeding or ulcers.   Genitourinary: Denies history of kidney stones, recurrent UTI's, history of urinary obstruction, gross hematuria, dysuria, urinary frequency, incontinence  Musculoskeletal: +back pain, +multiple joint pains   Skin: Denies history of skin cancer, denies rash, ulcerations  Heme/onc: Denies any history of cancer, denies history of coagulopathies or bleeding disorders  Endocrine: +lost 5-10 lbs with dietary modification; Denies thyroid disease  Neurological: +occasional dizziness/lightheadedness when BP drops; +occasional mild headaches occurring a couple times a week; +left hand steal symptoms; Denies tremors, seizures, peripheral neuropathy  Psychiatric: Denies anxiety, depression. Denies hallucinations or delusions.    Potential Donors: no      Objective:   Blood pressure (!) 122/57, pulse 84, temperature 99.2 °F (37.3 °C), temperature source Oral, resp. rate 20, height 5' 8.5" (1.74 m), weight 102.9 kg (226 lb 13.7 oz), SpO2 97 %.body mass index is 33.99 kg/m².    Physical Exam  General: No acute distress, well groomed, alert and oriented x 3  HEENT: Normocephalic, atraumatic, PERRLA, sclera anicteric, conjunctiva/corneas clear, EOM's intact bilaterally, external inspection of ears and nose normal, moist mucous membranes, no oral ulcerations/lesions   Neck: Supple, symmetrical, trachea midline, no masses, no carotid bruits, no JVD, thyroid is normal without nodules or " enlargement   Respiratory: Clear to auscultation bilaterally, respirations unlabored, no rales/rhonchi/wheezing   Cardiovacular: Regular rate and rhythm, S1, S2 normal, no murmurs, no rubs or gallops   Gastrointestinal: Soft, non-tender, bowel sounds normal, no masses, no palpable organomegaly  Extremities: No clubbing or cyanosis of upper extremities bilaterally, no pedal edema bilaterally; +2 bilateral radial pulses  Skin: warm and dry; no rash on exposed skin  Neurologic: No focal neurologic deficits.   Musculoskeletal: moves all extremities without difficulty, FROM, 5/5 strength, ambulates without an assistive device  Psychiatric: Normal mood and affect. Responds appropriately to questions.  Access: LUE AVF +thrill/bruit and RIJ Permcath      Labs:  Lab Results   Component Value Date    WBC 6.57 05/06/2020    HGB 10.9 (L) 05/06/2020    HCT 36.4 (L) 05/06/2020     05/06/2020    K 4.1 05/06/2020    CL 91 (L) 05/06/2020    CO2 34 (H) 05/06/2020    BUN 47 (H) 05/06/2020    CREATININE 10.2 (H) 05/06/2020    EGFRNONAA 5.3 (A) 05/06/2020    CALCIUM 9.5 05/06/2020    PHOS 4.7 (H) 05/06/2020    MG 1.6 03/30/2020    ALBUMIN 3.8 05/06/2020    AST 20 05/06/2020    ALT 20 05/06/2020    UTPCR 1.13 (H) 01/30/2020    .0 (H) 05/06/2020       Lab Results   Component Value Date    BILIRUBINUA Negative 01/30/2020    PROTEINUA 2+ (A) 01/30/2020    NITRITE Negative 01/30/2020    RBCUA 0 01/30/2020    WBCUA 1 01/30/2020       No results found for: HLAABCTYPE    Labs were reviewed with the patient.    Assessment:     1. ESRD on dialysis    2. Anemia of chronic kidney failure, stage 5    3. Essential hypertension    4. Hypertensive renal disease, malignant, stage 5 chronic kidney disease or end stage renal disease    5. Metabolic acidosis    6. Vitamin D deficiency    7. Secondary hyperparathyroidism    8. Pre-transplant evaluation for CKD (chronic kidney disease)        Plan:     Transplant Candidacy:   After obtaining  history and performing physical exam as well as reviewing available diagnostic studies, Mr. Owens is a suitable kidney transplant candidate.  Final determination of transplant candidacy will be made once workup is complete and reviewed by the selection committee.    Meets center eligibility for accepting HCV+ donor offer - yes.  Patient educated on HCV+ donors. Misael Owens is willing to accept HCV+ donor offer - yes.  Patient is a candidate for KDPI > 85 kidney donor offer - no.    Discussed with patient regarding how he may have had an undiagnosed GN which may have a risk of recurrence post-transplant and that we would monitor UPC post-transplant. Will obtain UA and UPC today    Exercise: reminded Misael of the importance of regular exercise for weight management, blood sugar and blood pressure management.  I also explained exercise has been shown to improve cardiovascular health, energy level, and sleep hygiene.  Lastly, I advised him that cardiovascular complications are leading cause of death for renal transplant recipients, and regular exercise can help lower this risk.    Encouraged him to seek living donors and to have them contact the living donor coordinator       Soila Osuna MD       [facility] Patient Status  Functional Status: 70% - Cares for self: unable to carry on normal activity or active work  Physical Capacity: No Limitations

## 2020-05-06 NOTE — LETTER
Date: 5/11/2020          Referral Process      To: Dialysis Unit  and Charge RN From: Janeth Portillo LMSW     RE: Misael Owens, 1970, [id number]     At [facility], we conduct adherence checks as an important part of transplant care. Initial and listed patient assessments are not complete without adherence information.        Please complete the following information:     Current Dry Weight: ___________         Most Recent Pre-Treatment Weight: ___________ /date: _________                    Data from the last 3 months:  (data from last 3 months preferred):    Number of AMAs with dates, time, and reasons: ____________________________________________________    ______________________________________________________________________________________________    ______________________________________________________________________________________________    Number of No-Shows with dates and reasons: ______________________________________________________      ______________________________________________________________________________________________    Last intact PTH:  ___________/date: __________      Any concerns with Labs:  YES / NO      If yes, please explain:  ___________________________________________________________________________    ______________________________________________________________________________________________    Any concerns with Caregivers:  YES / NO    If yes, please explain:  ___________________________________________________________________________    ______________________________________________________________________________________________     Any concerns with Transportation:  YES / NO    If yes, please explain:  ___________________________________________________________________________    ______________________________________________________________________________________________    Any Psychiatric and/or Psychosocial concerns:  YES  / NO     If yes, please explain: ___________________________________________________________________________    ______________________________________________________________________________________________      PLEASE RETURN TO: FAX: [phone]     Thank you for collaborating with us in the care of this patient.           [address]  ?  [place] [address]  ?  phone [phone]  ?  fax [phone]  ?  [email or web address]  Confidentiality notice: The accompanying facsimile is intended solely for the use of the recipient designated above. Document(s) transmitted herewith may contain information that is confidential and privileged. Delivery, distribution or dissemination of this communication other than to the intended recipient is strictly prohibited. If you have received this facsimile in error, please notify us immediately by telephone.

## 2020-05-06 NOTE — PROGRESS NOTES
PHARM.D. PRE-TRANSPLANT NOTE:    This patient's medication therapy was evaluated as part of his pre-transplant evaluation.      The following general pharmacologic concerns were noted: none - pt had an Rx bottle for pain meds (percocet 5/325) which was given to him for a procedure in 3/2020 but hasn't taken since then (only took #2)    The following pharmacologic concerns related to HCV therapy were noted: none      This patient's medication profile was reviewed for considerations for DAA Hepatitis C therapy:    [x]  No current inducers of CYP 3A4 or PGP  [x]  No amiodarone on this patient's EMR profile in the last 24 months  [x]  No past or current atrial fibrillation on this patient's EMR profile       Current Outpatient Medications   Medication Sig Dispense Refill    acetaminophen (TYLENOL) 325 MG tablet Take 325 mg by mouth every 6 (six) hours as needed for Pain. Takes 1-2 tablets prn      calcitRIOL (ROCALTROL) 0.5 MCG Cap Take 1 capsule (0.5 mcg total) by mouth once daily. (Patient taking differently: Take 0.5 mcg by mouth every morning. ) 30 capsule 3    ergocalciferol (ERGOCALCIFEROL) 50,000 unit Cap Take 1 capsule (50,000 Units total) by mouth every 7 days. (Patient taking differently: Take 50,000 Units by mouth every 7 days. Takes on Saturdays) 12 capsule 3    furosemide (LASIX) 40 MG tablet Take 1 tablet (40 mg total) by mouth once daily. 90 tablet 3    labetalol (NORMODYNE) 200 MG tablet Take 200 mg by mouth every 12 (twelve) hours.  4     No current facility-administered medications for this visit.      Facility-Administered Medications Ordered in Other Visits   Medication Dose Route Frequency Provider Last Rate Last Dose    0.9%  NaCl infusion   Intravenous Continuous Jay Sherman MD   Stopped at 03/31/20 1502    ceFAZolin injection 2 g  2 g Intravenous On Call Procedure Cherry Ocampo MD        lidocaine (PF) 10 mg/ml (1%) injection 10 mg  1 mL Intradermal Once Jay Sherman MD         mupirocin 2 % ointment   Nasal On Call Procedure Cherry Ocampo MD        ondansetron injection 4 mg  4 mg Intravenous Q12H PRN Jay Sherman MD        sodium chloride 0.9% flush 10 mL  10 mL Intravenous PRN Cherry Ocampo MD             Currently Mr Owens is responsible for preparing / administering this patient's medications on a daily basis.  I am available for consultation and can be contacted, as needed by the other members of the Kidney Transplant team.

## 2020-05-06 NOTE — LETTER
May 7, 2020        Matthew Moreno  [address]  [facility] [address]  Phone: [phone]  Fax: [phone]             [facility]  [address]  [facility] [address]  Phone: [phone]   Patient: Misael Owens   MR Number: [id number]   YOB: 1970   Date of Visit: 5/6/2020       Dear Dr. Matthew Moreno    Thank you for referring Misael Owens to me for evaluation. Attached you will find relevant portions of my assessment and plan of care.    If you have questions, please do not hesitate to call me. I look forward to following Misael Owens along with you.    Sincerely,    Soila Osuna MD    Enclosure    If you would like to receive this communication electronically, please contact [email or web address] or [phone] to request [employer] access.    [employer] is a tool which provides read-only access to select patient information with whom you have a relationship. Its easy to use and provides real time access to review your patients record including encounter summaries, notes, results, and demographic information.    If you feel you have received this communication in error or would no longer like to receive these types of communications, please e-mail [email or web address]

## 2020-05-06 NOTE — TELEPHONE ENCOUNTER
Reviewed pt transplant labs.  Notified dialysis unit dietitian of the following abnormal labs via fax and requested their most recent nutrition note on this pt.  Once this note is received it will be scanned into pt's chart.    Phos 4.7

## 2020-05-06 NOTE — PROGRESS NOTES
Transplant Surgery  Kidney Transplant Recipient Evaluation    Referring Physician: Matthew Moreno  Current Nephrologist: Matthew Moreno    Subjective:     Reason for Visit: evaluate transplant candidacy    History of Present Illness: Misael Owens is a 50 y.o. year old male undergoing transplant evaluation.    Dialysis History: Misael is on hemodialysis.      Transplant History: N/A    Etiology of Renal Disease: Malignant Hypertension (based on medical records from referral).    Review of Systems   Constitutional: Negative for fatigue.   HENT: Negative for drooling, postnasal drip and sore throat.    Eyes: Negative for discharge and itching.   Respiratory: Negative for choking and stridor.    Gastrointestinal: Negative for rectal pain.   Endocrine: Negative for polydipsia.   Genitourinary: Negative for enuresis and genital sores.   Musculoskeletal: Negative for back pain, neck pain and neck stiffness.   Allergic/Immunologic: Negative for immunocompromised state.   Neurological: Negative for facial asymmetry and numbness.   Hematological: Negative for adenopathy.   Psychiatric/Behavioral: Negative for behavioral problems, self-injury and suicidal ideas.       Objective:     Physical Exam:  Constitutional:   Vitals reviewed: yes   Well-nourished and well-groomed: yes  Eyes:   Sclerae icteric: no   Extraocular movements intact: yes  GI:    Bowel sounds normal: yes   Tenderness: no    If yes, quadrant/location: not applicable   Palpable masses: no    If yes, quadrant/location: not applicable   Hepatosplenomegaly: no   Ascites: no   Hernia: no    If yes, type/location: not applicable   Surgical scars: no    If yes, type/location: not applicable  Resp:   Effort normal: yes   Breath sounds normal: yes    CV:   Regular rate and rhythm: yes   Heart sounds normal: yes   Femoral pulses normal: yes   Extremities edematous: no  Skin:   Rashes or lesions present: no    If yes, describe:not applicable   Jaundice::  no    Musculoskeletal:   Gait normal: yes   Strength normal: yes  Psych:   Oriented to person, place, and time: yes   Affect and mood normal: yes    Additional comments: not applicable    Counseling: We provided Misael Owens with a group education session today.  We discussed kidney transplantation at length with him, including risks, potential complications, and alternatives in the management of his renal failure.  The discussion included complications related to anesthesia, bleeding, infection, primary nonfunction, and ATN.  I discussed the typical postoperative course, length of hospitalization, the need for long-term immunosuppression, and the need for long-term routine follow-up.  I discussed living-donor and -donor transplantation and the relative advantages and disadvantages of each.  I also discussed average waiting times for both living donation and  donation.  I discussed national and center-specific survival rates.  I also mentioned the potential benefit of multicenter listing to candidates listed with centers within more than one organ procurement organization.  All questions were answered.    Final determination of transplant candidacy will be made once evaluation is complete and reviewed by the Kidney & Kidney/Pancreas Selection Committee.         Transplant Surgery - Candidacy   Assessment/Plan:   Misael Owens has end stage renal disease (ESRD) on dialysis. I see no surgical contraindication to placing a kidney transplant. Based on available information, Misael Owens is a suitable kidney transplant candidate.     Matt Houston MD

## 2020-05-07 ENCOUNTER — TELEPHONE (OUTPATIENT)
Dept: TRANSPLANT | Facility: CLINIC | Age: 50
End: 2020-05-07

## 2020-05-11 NOTE — PROGRESS NOTES
"SW met with patient in clinic due to current COVID-19 pandemic and limiting contact for patient safety.     Transplant Recipient Adult Psychosocial Assessment    Misael Owens  [address]  [place] [address]  Telephone Information:   Mobile [phone]   Home  [phone] (home)  Work  There is no work phone number on file.  E-mail  [email or web address]    Sex: male  YOB: 1970  Age: 50 y.o.    Encounter Date: 5/6/2020  U[place] Citizen: yes  Primary Language: English   Needed: no    Emergency Contact:  Name: Kim Dietz  Relationship: friend  Address: same as pt   Phone Numbers:  [phone] (home)    Family/Social Support:   Number of dependents/: pt reports 2 minor dependents: Tea 6 and Faustina 7  Marital history: pt reports no marital history or current relationship   Other family dynamics: pt presents alone due to COVID19 precautions. Pt resides with good friend, Sanna Dietz. Pt reports Sanna is very supportive. Pt also reports having 3 adult children, who are involved in pt's life. Pt reports mother as alive and well. Pt reports having 1 sibling, who he has an "on and off" relationship. Pt also reports he is currently seeking full custody of minor children.     Household Composition:    Name: Kim Dietz  Relationship: friend  Phone Numbers:  [phone] (home)    Do you and your caregivers have access to reliable transportation? yes  PRIMARY CAREGIVER: Sanna Dietz, pt's friend, will be primary caregiver phone number [phone]. Pt provided permission for SW to contact caregiver.      provided in-depth information to patient and caregiver regarding pre- and post-transplant caregiver role.   strongly encourages patient and caregiver to have concrete plan regarding post-transplant care giving, including back-up caregiver(s) to ensure care giving needs are met as needed.    Patient states understanding all aspects of caregiver " role/commitment.    Patient verbalizes understanding of the education provided today and caregiver responsibilities.         remains available. Patient agree to contact  in a timely manner if concerns arise.      Able to take time off work without financial concerns: yes.     Additional Significant Others who will Assist with Transplant:  Name: Lamar Owens   Relationship: mother   Address: [address]  Phone Numbers:  [phone] (mobile)    Living Will: no  Healthcare Power of : no pt reports trusting mother and adult children with medical decisions as needed.   Advance Directives on file: <<no information> per medical record.  Verbally reviewed LW/HCPA information.   provided patient with copy of LW/HCPA documents and provided education on completion of forms.    Living Donors: No. Education and resource information given to patient.    Highest Education Level: High School (9-12) or GED  Reading Ability: 12th grade  Reports difficulty with: seeing small print. Pt utilizes reading glasses as needed.   Learns Best By:  A combination of verbal, written, and hands-on instruction.      Status: no  VA Benefits: no     Working for Income: No  If no, reason not working: Demands of Treatment  Patient is disabled.  Prior to disability, patient  was employed as a plant contractor and plans to return to work once health improves..     Spouse/Significant Other Employment: Pt reports no s/o     Disabled: no pt reports he has applied for disability and status is pending.     Monthly Income:  SS Disability: $0   Pt reports having no income currently and is leaving off savings until disability is confirmed. Pt also reports friend [employer] assist financially as well.   Able to afford all costs now and if transplanted, including medications: yes pt reports no concerns and reports having Medicaid.   Patient verbalizes understanding of personal responsibilities related to  transplant costs and the importance of having a financial plan to ensure that patients transplant costs are fully covered.       provided fundraising information/education. Patient verbalizes understanding.   remains available.    Insurance:   Payor/Plan Subscr  Sex Relation Sub. Ins. ID Effective Group Num   1. [employer] MANAGED* HARRISON KENT 1970 Male Self [id number]                                    [address] [address]   2. [place]ICAID [place]* HARRISON KENT 1970 Male  [id number]                                   [address] [address]       Primary Insurance (for UNOS reporting): Public Insurance - Medicaid  Secondary Insurance (for UNOS reporting): None  Patient verbalizes clear understanding that patient may experience difficulty obtaining and/or be denied insurance coverage post-surgery. This includes and is not limited to disability insurance, life insurance, health insurance, burial insurance, long term care insurance, and other insurances.      Patient also reports understanding that future health concerns related to or unrelated to transplantation may not be covered by patient's insurance.  Resources and information provided and reviewed.     Patient provides verbal permission to release any necessary information to outside resources for patient care and discharge planning.  Resources and information provided are reviewed.      Dialysis Adherence: Patient reports high adherence with dialysis treatments since starting on 2020.     Infusion Service: patient utilizing? no  Home Health: patient utilizing? no  DME: yes bpc and scale   Pulmonary/Cardiac Rehab: pt denies    ADLS:  Pt reports pt is independent with all ADLS including , bathing,walking,taking medications, cooking, housekeeping, eating, and shopping. Pt reports he does not drive often due to joint pain. Pt's friend Kim assist with transportation.     Adherence: Adherence education and counseling provided.  "    Per History Section:  Past Medical History:   Diagnosis Date    CKD (chronic kidney disease), stage IV     due to hypertension    ESRD on dialysis     Hypertension     Secondary hyperparathyroidism     Vitamin D deficiency      Social History     Tobacco Use    Smoking status: Never Smoker    Smokeless tobacco: Never Used   Substance Use Topics    Alcohol use: No     Social History     Substance and Sexual Activity   Drug Use No     Social History     Substance and Sexual Activity   Sexual Activity Not on file       Per Today's Psychosocial:  Tobacco: none, patient denies any use.  Alcohol: none, patient denies any use.  Illicit Drugs/Non-prescribed Medications: none, patient denies any use.    Patient states clear understanding of the potential impact of substance use as it relates to transplant candidacy and is aware of possible random substance screening.  Substance abstinence/cessation counseling, education and resources provided and reviewed.     Arrests/DWI/Treatment/Rehab: patient denies    Psychiatric History:    Mental Health: anxiety associated with fearing for minor children's safety and custody clark with children's mother. Pt reports difficulty securing custody of children, "even though I am a good father. Even if she puts them in danger, the court still gives them to her". SW provided acknowledgement, active listening, normalization and support. SW inquired if pt is able to able to have visitation. Pt reports he was able to visit the children prior to COVID19 crisis and he currently waiting a judgement.   Psychiatrist/Counselor: Pt denies currently or in the past and reports willingness to meet with psychiatry if required by transplant.   Medications:  Pt denies utilizing mental health medications currently or in the past  Suicide/Homicide Issues: Pt denies feelings of wanting to harm self or other currently or in the past  Safety at home: Pt reports feeling safe at home.     Knowledge: " Patient states having clear understanding and realistic expectations regarding the potential risks and potential benefits of organ transplantation and organ donation and agrees to discuss with health care team members and support system members, as well as to utilize available resources and express questions and/or concerns in order to further facilitate the pt informed decision-making.  Resources and information provided and reviewed.    Patient is aware of [facility]'s affiliation and/or partnership with agencies in home health care, LTAC, SNF, [facility], and other hospitals and clinics.    Understanding: Patient reports having a clear understanding of the many lifetime commitments involved with being a transplant recipient, including costs, compliance, medications, lab work, procedures, appointments, concrete and financial planning, preparedness, timely and appropriate communication of concerns, abstinence (ETOH, tobacco, illicit non-prescribed drugs), adherence to all health care team recommendations, support system and caregiver involvement, appropriate and timely resource utilization and follow-through, mental health counseling as needed/recommended, and patient and caregiver responsibilities.  Social Service Handbook, resources and detailed educational information provided and reviewed.  Educational information provided.    Patient also reports current and expected compliance with health care regime and states having a clear understanding of the importance of compliance.      Patient reports a clear understanding that risks and benefits may be involved with organ transplantation and with organ donation.       Patient also reports clear understanding that psychosocial risk factors may affect patient, and include but are not limited to feelings of depression, generalized anxiety, anxiety regarding dependence on others, post traumatic stress disorder, feelings of guilt and other emotional and/or mental concerns, and/or  exacerbation of existing mental health concerns.  Detailed resources provided and discussed.      Patient agrees to access appropriate resources in a timely manner as needed and/or as recommended, and to communicate concerns appropriately.  Patient also reports a clear understanding of treatment options available.      Patient received education in a group setting.   reviewed education, provided additional information, and answered questions.    Feelings or Concerns: Pt reports no concerns and eagerness for transplant.      Coping: Identify Patient & Caregiver Strategies to [place]:   1. Currently & Pre-transplant - spending time with children and friends    2. At the time of surgery - visits from friends/children   3. During post-Transplant & Recovery Period - prayerfully having kids visit him     Goals: Pt plans to return to work and has a good relationship with previous employer.     Interview Behavior: Patient presents as alert and oriented x 4, pleasant, good eye contact, well groomed, recall good, concentration/judgement good, average intelligence, calm, communicative, cooperative and asking and answering questions appropriately. Pt was highly engaged but needed redirecting at times. Pt is highly motivated for transplant.          Transplant Social Work - Candidacy  Assessment/Plan:     Psychosocial Suitability: Patient presents as a suitable candidate for transplant at this time pending caregiver confirmation. Based on psychosocial risk factors, patient presents as low risk, due to absence of overwhelming psychosocial concerns. .    Recommendations/Additional Comments: SW recommends that pt conduct fundraising to assist pt with pay for cost of medications, food, gas, and other transplant related needs. SW recommends that pt remain aware of potential mental health concerns and contact the team if any concerns arise. SW recommends that pt remain abstinent from tobacco, ETOH, and drug use. SW supports  pt's continued dialysis adherence. SW remains available to answer any questions or concerns that arise as the pt moves through the transplant process.     Hemanth Portillo, DAVID, ALFREDITOSW

## 2020-05-18 DIAGNOSIS — Z76.82 ORGAN TRANSPLANT CANDIDATE: Primary | ICD-10-CM

## 2020-05-22 ENCOUNTER — CLINICAL SUPPORT (OUTPATIENT)
Dept: CARDIOLOGY | Facility: CLINIC | Age: 50
End: 2020-05-22
Payer: MEDICAID

## 2020-05-22 ENCOUNTER — HOSPITAL ENCOUNTER (OUTPATIENT)
Dept: CARDIOLOGY | Facility: CLINIC | Age: 50
Discharge: HOME | End: 2020-05-22
Attending: NURSE PRACTITIONER
Payer: MEDICAID

## 2020-05-22 VITALS
DIASTOLIC BLOOD PRESSURE: 57 MMHG | SYSTOLIC BLOOD PRESSURE: 122 MMHG | BODY MASS INDEX: 33.47 KG/M2 | HEIGHT: 69 IN | WEIGHT: 226 LBS | HEART RATE: 75 BPM

## 2020-05-22 DIAGNOSIS — Z76.82 ORGAN TRANSPLANT CANDIDATE: ICD-10-CM

## 2020-05-22 LAB
ASCENDING AORTA: 3.11 CM
AV INDEX (PROSTH): 0.97
AV MEAN GRADIENT: 3 MMHG
AV PEAK GRADIENT: 8 MMHG
AV VALVE AREA: 3.96 CM2
AV VELOCITY RATIO: 0.8
BSA FOR ECHO PROCEDURE: 2.23 M2
CV ECHO LV RWT: 0.29 CM
CV PHARM DOSE: 0.4 MG
CV STRESS BASE HR: 76 BPM
DIASTOLIC BLOOD PRESSURE: 57 MMHG
DOP CALC AO PEAK VEL: 1.45 M/S
DOP CALC AO VTI: 24.45 CM
DOP CALC LVOT AREA: 4.1 CM2
DOP CALC LVOT DIAMETER: 2.28 CM
DOP CALC LVOT PEAK VEL: 1.16 M/S
DOP CALC LVOT STROKE VOLUME: 96.92 CM3
DOP CALCLVOT PEAK VEL VTI: 23.75 CM
E WAVE DECELERATION TIME: 238.62 MSEC
E/A RATIO: 1.16
E/E' RATIO: 8.32 M/S
ECHO LV POSTERIOR WALL: 0.8 CM (ref 0.6–1.1)
FRACTIONAL SHORTENING: 36 % (ref 28–44)
INTERVENTRICULAR SEPTUM: 0.81 CM (ref 0.6–1.1)
LA MAJOR: 5.86 CM
LA MINOR: 5.6 CM
LA WIDTH: 3.86 CM
LEFT ATRIUM SIZE: 4.05 CM
LEFT ATRIUM VOLUME INDEX: 35 ML/M2
LEFT ATRIUM VOLUME: 76.1 CM3
LEFT INTERNAL DIMENSION IN SYSTOLE: 3.51 CM (ref 2.1–4)
LEFT VENTRICLE DIASTOLIC VOLUME INDEX: 67.59 ML/M2
LEFT VENTRICLE DIASTOLIC VOLUME: 147.07 ML
LEFT VENTRICLE MASS INDEX: 74 G/M2
LEFT VENTRICLE SYSTOLIC VOLUME INDEX: 23.5 ML/M2
LEFT VENTRICLE SYSTOLIC VOLUME: 51.16 ML
LEFT VENTRICULAR INTERNAL DIMENSION IN DIASTOLE: 5.49 CM (ref 3.5–6)
LEFT VENTRICULAR MASS: 160.71 G
LV LATERAL E/E' RATIO: 7.9 M/S
LV SEPTAL E/E' RATIO: 8.78 M/S
MV PEAK A VEL: 0.68 M/S
MV PEAK E VEL: 0.79 M/S
OHS CV CPX 85 PERCENT MAX PREDICTED HEART RATE MALE: 145
OHS CV CPX MAX PREDICTED HEART RATE: 170
OHS CV CPX PATIENT IS FEMALE: 0
OHS CV CPX PATIENT IS MALE: 1
OHS CV CPX PEAK DIASTOLIC BLOOD PRESSURE: 63 MMHG
OHS CV CPX PEAK HEAR RATE: 78 BPM
OHS CV CPX PEAK RATE PRESSURE PRODUCT: 8190
OHS CV CPX PEAK SYSTOLIC BLOOD PRESSURE: 105 MMHG
OHS CV CPX PERCENT MAX PREDICTED HEART RATE ACHIEVED: 46
OHS CV CPX RATE PRESSURE PRODUCT PRESENTING: 7752
PISA TR MAX VEL: 1.44 M/S
PULM VEIN S/D RATIO: 1.24
PV PEAK D VEL: 0.45 M/S
PV PEAK S VEL: 0.56 M/S
RA MAJOR: 5.03 CM
RA PRESSURE: 3 MMHG
RA WIDTH: 3.73 CM
RIGHT VENTRICULAR END-DIASTOLIC DIMENSION: 4.04 CM
RV TISSUE DOPPLER FREE WALL SYSTOLIC VELOCITY 1 (APICAL 4 CHAMBER VIEW): 18.39 CM/S
SINUS: 3.42 CM
STJ: 3 CM
STRESS ECHO TARGET HR: 145 BPM
SYSTOLIC BLOOD PRESSURE: 102 MMHG
TDI LATERAL: 0.1 M/S
TDI SEPTAL: 0.09 M/S
TDI: 0.1 M/S
TR MAX PG: 8 MMHG
TRICUSPID ANNULAR PLANE SYSTOLIC EXCURSION: 2.34 CM
TV REST PULMONARY ARTERY PRESSURE: 11 MMHG

## 2020-05-22 PROCEDURE — 93018 CV STRESS TEST I&R ONLY: CPT | Mod: S$PBB,TXP,, | Performed by: INTERNAL MEDICINE

## 2020-05-22 PROCEDURE — 93306 TTE W/DOPPLER COMPLETE: CPT | Mod: PBBFAC,TXP | Performed by: INTERNAL MEDICINE

## 2020-05-22 PROCEDURE — 93306 ECHO (CUPID ONLY): ICD-10-PCS | Mod: 26,S$PBB,TXP, | Performed by: INTERNAL MEDICINE

## 2020-05-22 PROCEDURE — 78452 HT MUSCLE IMAGE SPECT MULT: CPT | Mod: PBBFAC,TXP | Performed by: INTERNAL MEDICINE

## 2020-05-22 PROCEDURE — 93018 STRESS TEST WITH MYOCARDIAL PERFUSION (CUPID ONLY): ICD-10-PCS | Mod: S$PBB,TXP,, | Performed by: INTERNAL MEDICINE

## 2020-05-22 PROCEDURE — 78452 STRESS TEST WITH MYOCARDIAL PERFUSION (CUPID ONLY): ICD-10-PCS | Mod: 26,S$PBB,TXP, | Performed by: INTERNAL MEDICINE

## 2020-05-22 PROCEDURE — 93016 STRESS TEST WITH MYOCARDIAL PERFUSION (CUPID ONLY): ICD-10-PCS | Mod: S$PBB,TXP,, | Performed by: INTERNAL MEDICINE

## 2020-05-22 PROCEDURE — 99211 OFF/OP EST MAY X REQ PHY/QHP: CPT | Mod: PBBFAC,25,TXP

## 2020-05-22 PROCEDURE — 93016 CV STRESS TEST SUPVJ ONLY: CPT | Mod: S$PBB,TXP,, | Performed by: INTERNAL MEDICINE

## 2020-05-22 PROCEDURE — 99999 PR PBB SHADOW E&M-EST. PATIENT-LVL I: ICD-10-PCS | Mod: PBBFAC,TXP,,

## 2020-05-22 PROCEDURE — 99999 PR PBB SHADOW E&M-EST. PATIENT-LVL I: CPT | Mod: PBBFAC,TXP,,

## 2020-05-22 RX ORDER — REGADENOSON 0.08 MG/ML
0.4 INJECTION, SOLUTION INTRAVENOUS
Status: COMPLETED | OUTPATIENT
Start: 2020-05-22 | End: 2020-05-22

## 2020-05-22 RX ADMIN — REGADENOSON 0.4 MG: 0.08 INJECTION, SOLUTION INTRAVENOUS at 09:05

## 2020-05-26 ENCOUNTER — PATIENT OUTREACH (OUTPATIENT)
Dept: ADMINISTRATIVE | Facility: OTHER | Age: 50
End: 2020-05-26

## 2020-05-26 NOTE — PROGRESS NOTES
LINKS immunization registry triggered, Care Everywhere and Health Maintenance updated.  Chart reviewed for overdue Proactive [facility] health maintenance testing.Patient has open case request for colonoscopy.  [employer] order not entered

## 2020-05-27 ENCOUNTER — TELEPHONE (OUTPATIENT)
Dept: TRANSPLANT | Facility: CLINIC | Age: 50
End: 2020-05-27

## 2020-05-27 NOTE — TELEPHONE ENCOUNTER
----- Message from Gertrude Rodríguez RD sent at 5/27/2020  9:43 AM CDT -----  Roshni Marques,  Someone from a [facility] dialysis center in [place] just called and let me know that this patient transferred to their center yesterday. Her name was Monica and she left this phone number as a contact number in case you need to get more info from her: [phone]. From their website, the site name is [facility] or [facility].    Thanks,  Kriss

## 2020-05-28 ENCOUNTER — TELEPHONE (OUTPATIENT)
Dept: VASCULAR SURGERY | Facility: CLINIC | Age: 50
End: 2020-05-28

## 2020-05-28 NOTE — TELEPHONE ENCOUNTER
"Called Mr Owens to reschedule appointment he states " I can't come to the appointments I live in [place] and I have dialysis on Tuesday,Thursday,Saturday. I know Dr Grant only have clinic on Thursdays. So cancel my appointment today and I will figure it out or get somebody else."  "

## 2020-05-28 NOTE — TELEPHONE ENCOUNTER
----- Message from Ilana Sharma sent at 5/28/2020  3:43 PM CDT -----  Pt had an appt today and was cancel by mariana schulz. Pt is stating that he would like to reschedule this appt and would like for the nurse to give him a call back

## 2020-06-23 ENCOUNTER — PATIENT OUTREACH (OUTPATIENT)
Dept: ADMINISTRATIVE | Facility: OTHER | Age: 50
End: 2020-06-23

## 2020-07-05 ENCOUNTER — PATIENT OUTREACH (OUTPATIENT)
Dept: ADMINISTRATIVE | Facility: OTHER | Age: 50
End: 2020-07-05

## 2020-07-27 DIAGNOSIS — Z01.818 PRE-OP TESTING: ICD-10-CM

## 2020-07-27 DIAGNOSIS — Z12.11 SPECIAL SCREENING FOR MALIGNANT NEOPLASMS, COLON: Primary | ICD-10-CM

## 2020-07-27 DIAGNOSIS — Z99.2 DIALYSIS PATIENT: ICD-10-CM

## 2020-07-27 RX ORDER — POLYETHYLENE GLYCOL 3350, SODIUM SULFATE ANHYDROUS, SODIUM BICARBONATE, SODIUM CHLORIDE, POTASSIUM CHLORIDE 236; 22.74; 6.74; 5.86; 2.97 G/4L; G/4L; G/4L; G/4L; G/4L
4 POWDER, FOR SOLUTION ORAL ONCE
Qty: 4000 ML | Refills: 0 | Status: SHIPPED | OUTPATIENT
Start: 2020-07-27 | End: 2020-07-27

## 2020-08-11 ENCOUNTER — TELEPHONE (OUTPATIENT)
Dept: TRANSPLANT | Facility: CLINIC | Age: 50
End: 2020-08-11

## 2020-08-11 NOTE — TELEPHONE ENCOUNTER
Received call from pt's dialysis center asking about update on pt's workup status. Provided contact info for pt's nurse coordinator for more information.

## 2020-08-14 ENCOUNTER — PATIENT OUTREACH (OUTPATIENT)
Dept: ADMINISTRATIVE | Facility: OTHER | Age: 50
End: 2020-08-14

## 2020-08-17 ENCOUNTER — TELEPHONE (OUTPATIENT)
Dept: SURGERY | Facility: CLINIC | Age: 50
End: 2020-08-17

## 2020-08-17 NOTE — TELEPHONE ENCOUNTER
Message forwarded to Bella Roa RN and she'll call the patient to reschedule the colonoscopy.  Jack

## 2020-08-17 NOTE — TELEPHONE ENCOUNTER
----- Message from Dana Saucedo sent at 8/17/2020  2:28 PM CDT -----  Regarding: reschedule  Contact: patient  Patient is requesting a call regarding rescheduling colonoscopy   Patient states dialysis days are [id number]    Patient can be reached at [phone]

## 2020-09-23 ENCOUNTER — TELEPHONE (OUTPATIENT)
Dept: TRANSPLANT | Facility: CLINIC | Age: 50
End: 2020-09-23

## 2020-09-23 ENCOUNTER — TELEPHONE (OUTPATIENT)
Dept: UROLOGY | Facility: CLINIC | Age: 50
End: 2020-09-23

## 2020-09-23 ENCOUNTER — TELEPHONE (OUTPATIENT)
Dept: INTERNAL MEDICINE | Facility: CLINIC | Age: 50
End: 2020-09-23

## 2020-09-23 NOTE — TELEPHONE ENCOUNTER
Appointment has been made for the patient.  Patient verbally understood    Sharon JACKSON    ----- Message from Ilana Sharma sent at 9/23/2020 12:05 PM CDT -----  Pt is calling to reschedule his appt and would like for the nurse to give her a call back. Pt has dialysis on tues thur and eliecer Schaefer [phone]

## 2020-09-23 NOTE — TELEPHONE ENCOUNTER
----- Message from Jackie Gale sent at 9/23/2020 11:55 AM CDT -----  Contact: Kim Choudhury   Pt received a letter about selecting a new PCP and would like to make Merritt Lofton MD his PCP. Pt would like to schedule an appt for a physical in November. Pt has Medicaid.

## 2020-09-23 NOTE — TELEPHONE ENCOUNTER
Spoke to pt and scheduled appt  Informed that Dr Lofton is a 3rd yr resident and this is his last yr  Appt scheduled with 1st yr resident instead

## 2020-10-01 ENCOUNTER — TELEPHONE (OUTPATIENT)
Dept: TRANSPLANT | Facility: CLINIC | Age: 50
End: 2020-10-01

## 2020-10-01 NOTE — TELEPHONE ENCOUNTER
Spoke with patient regarding colonoscopy and urology appointments needing to be scheduled to complete evaluation part of work up. Patient confirms Urology consult is scheduled for 10/5 and colonoscopy appointment is scheduled for 10/26. Patient educated on importance of attending this appointments due to no show to previous appointments. Patient reports understanding. Denies other questions or concerns at this time.

## 2020-10-02 ENCOUNTER — PATIENT OUTREACH (OUTPATIENT)
Dept: ADMINISTRATIVE | Facility: OTHER | Age: 50
End: 2020-10-02

## 2020-10-02 NOTE — PROGRESS NOTES
LINKS immunization registry updated  Care Everywhere updated  Health Maintenance updated  Chart reviewed for overdue [facility]active [facility] ([facility]) health maintenance testing (CRS, Breast Ca, Diabetic Eye Exam)   Orders entered:N/A

## 2020-10-05 ENCOUNTER — OFFICE VISIT (OUTPATIENT)
Dept: UROLOGY | Facility: CLINIC | Age: 50
End: 2020-10-05
Payer: MEDICAID

## 2020-10-05 VITALS
SYSTOLIC BLOOD PRESSURE: 127 MMHG | HEART RATE: 79 BPM | HEIGHT: 69 IN | WEIGHT: 226 LBS | DIASTOLIC BLOOD PRESSURE: 76 MMHG | BODY MASS INDEX: 33.47 KG/M2

## 2020-10-05 DIAGNOSIS — N28.1 RENAL CYST: Primary | ICD-10-CM

## 2020-10-05 DIAGNOSIS — R39.89 ABNORMAL PROSTATE EXAM: ICD-10-CM

## 2020-10-05 DIAGNOSIS — R97.20 ELEVATED PSA: ICD-10-CM

## 2020-10-05 PROCEDURE — 99204 OFFICE O/P NEW MOD 45 MIN: CPT | Mod: S$PBB,TXP,, | Performed by: UROLOGY

## 2020-10-05 PROCEDURE — 99999 PR PBB SHADOW E&M-EST. PATIENT-LVL IV: ICD-10-PCS | Mod: PBBFAC,TXP,, | Performed by: UROLOGY

## 2020-10-05 PROCEDURE — 99204 PR OFFICE/OUTPT VISIT, NEW, LEVL IV, 45-59 MIN: ICD-10-PCS | Mod: S$PBB,TXP,, | Performed by: UROLOGY

## 2020-10-05 PROCEDURE — 81002 URINALYSIS NONAUTO W/O SCOPE: CPT | Mod: PBBFAC,PN,TXP | Performed by: UROLOGY

## 2020-10-05 PROCEDURE — 87086 URINE CULTURE/COLONY COUNT: CPT | Mod: TXP

## 2020-10-05 PROCEDURE — 99214 OFFICE O/P EST MOD 30 MIN: CPT | Mod: PBBFAC,PN,TXP | Performed by: UROLOGY

## 2020-10-05 PROCEDURE — 99999 PR PBB SHADOW E&M-EST. PATIENT-LVL IV: CPT | Mod: PBBFAC,TXP,, | Performed by: UROLOGY

## 2020-10-05 RX ORDER — LIDOCAINE HYDROCHLORIDE 20 MG/ML
JELLY TOPICAL ONCE
Status: CANCELLED | OUTPATIENT
Start: 2020-10-05 | End: 2020-10-05

## 2020-10-05 RX ORDER — CIPROFLOXACIN 500 MG/1
500 TABLET ORAL DAILY
Qty: 3 TABLET | Refills: 0 | Status: SHIPPED | OUTPATIENT
Start: 2020-10-05 | End: 2020-10-08

## 2020-10-07 LAB
BACTERIA UR CULT: NO GROWTH
BILIRUB SERPL-MCNC: ABNORMAL MG/DL
BLOOD URINE, POC: 50
CLARITY, POC UA: CLEAR
COLOR, POC UA: YELLOW
GLUCOSE UR QL STRIP: ABNORMAL
KETONES UR QL STRIP: ABNORMAL
LEUKOCYTE ESTERASE URINE, POC: ABNORMAL
NITRITE, POC UA: ABNORMAL
PH, POC UA: 5
PROTEIN, POC: ABNORMAL
SPECIFIC GRAVITY, POC UA: 1.02
UROBILINOGEN, POC UA: ABNORMAL

## 2020-10-19 PROBLEM — R97.20 ELEVATED PSA: Status: ACTIVE | Noted: 2020-10-19

## 2020-10-19 PROBLEM — N28.1 RENAL CYST: Status: ACTIVE | Noted: 2020-10-19

## 2020-10-23 ENCOUNTER — TELEPHONE (OUTPATIENT)
Dept: SURGERY | Facility: CLINIC | Age: 50
End: 2020-10-23

## 2020-10-23 ENCOUNTER — TELEPHONE (OUTPATIENT)
Dept: ENDOSCOPY | Facility: HOSPITAL | Age: 50
End: 2020-10-23

## 2020-10-23 NOTE — TELEPHONE ENCOUNTER
----- Message from Miriam Canchola sent at 10/23/2020  1:51 PM CDT -----  Contact: Pt  Pt called to see if it's ok for him to take the prep for his colonoscopy with his other health conditions. Pt asked for a call back

## 2020-10-23 NOTE — TELEPHONE ENCOUNTER
Received in basket message patient has questions regarding colonoscopy scheduled on 10/26/20. Called patient. Unable to leave message- no voicemail.

## 2020-10-26 ENCOUNTER — LAB VISIT (OUTPATIENT)
Dept: LAB | Facility: HOSPITAL | Age: 50
End: 2020-10-26
Attending: SURGERY
Payer: MEDICAID

## 2020-10-26 ENCOUNTER — ANESTHESIA EVENT (OUTPATIENT)
Dept: ENDOSCOPY | Facility: HOSPITAL | Age: 50
End: 2020-10-26
Payer: MEDICAID

## 2020-10-26 ENCOUNTER — HOSPITAL ENCOUNTER (OUTPATIENT)
Facility: HOSPITAL | Age: 50
Discharge: HOME | End: 2020-10-26
Attending: COLON & RECTAL SURGERY | Admitting: COLON & RECTAL SURGERY
Payer: MEDICAID

## 2020-10-26 ENCOUNTER — ANESTHESIA (OUTPATIENT)
Dept: ENDOSCOPY | Facility: HOSPITAL | Age: 50
End: 2020-10-26
Payer: MEDICAID

## 2020-10-26 ENCOUNTER — TELEPHONE (OUTPATIENT)
Dept: ENDOSCOPY | Facility: HOSPITAL | Age: 50
End: 2020-10-26

## 2020-10-26 VITALS
SYSTOLIC BLOOD PRESSURE: 127 MMHG | TEMPERATURE: 98 F | HEART RATE: 80 BPM | DIASTOLIC BLOOD PRESSURE: 69 MMHG | RESPIRATION RATE: 14 BRPM | BODY MASS INDEX: 32.06 KG/M2 | WEIGHT: 229 LBS | HEIGHT: 71 IN | OXYGEN SATURATION: 100 %

## 2020-10-26 DIAGNOSIS — Z99.2 DIALYSIS PATIENT: ICD-10-CM

## 2020-10-26 DIAGNOSIS — Z12.11 SCREEN FOR COLON CANCER: ICD-10-CM

## 2020-10-26 LAB
POTASSIUM SERPL-SCNC: 4.1 MMOL/L (ref 3.5–5.1)
SARS-COV-2 RDRP RESP QL NAA+PROBE: NEGATIVE

## 2020-10-26 PROCEDURE — 63600175 PHARM REV CODE 636 W HCPCS: Mod: TXP | Performed by: NURSE ANESTHETIST, CERTIFIED REGISTERED

## 2020-10-26 PROCEDURE — 84132 ASSAY OF SERUM POTASSIUM: CPT | Mod: NTX

## 2020-10-26 PROCEDURE — 25000003 PHARM REV CODE 250: Mod: TXP | Performed by: NURSE ANESTHETIST, CERTIFIED REGISTERED

## 2020-10-26 PROCEDURE — 25000003 PHARM REV CODE 250: Mod: TXP | Performed by: COLON & RECTAL SURGERY

## 2020-10-26 PROCEDURE — G0121 COLON CA SCRN NOT HI RSK IND: HCPCS | Mod: TXP | Performed by: COLON & RECTAL SURGERY

## 2020-10-26 PROCEDURE — 37000008 HC ANESTHESIA 1ST 15 MINUTES: Mod: TXP | Performed by: COLON & RECTAL SURGERY

## 2020-10-26 PROCEDURE — E9220 PRA ENDO ANESTHESIA: HCPCS | Mod: TXP,,, | Performed by: NURSE ANESTHETIST, CERTIFIED REGISTERED

## 2020-10-26 PROCEDURE — G0121 COLON CA SCRN NOT HI RSK IND: ICD-10-PCS | Mod: TXP,,, | Performed by: COLON & RECTAL SURGERY

## 2020-10-26 PROCEDURE — E9220 PRA ENDO ANESTHESIA: ICD-10-PCS | Mod: TXP,,, | Performed by: NURSE ANESTHETIST, CERTIFIED REGISTERED

## 2020-10-26 PROCEDURE — 37000009 HC ANESTHESIA EA ADD 15 MINS: Mod: TXP | Performed by: COLON & RECTAL SURGERY

## 2020-10-26 PROCEDURE — G0121 COLON CA SCRN NOT HI RSK IND: HCPCS | Mod: TXP,,, | Performed by: COLON & RECTAL SURGERY

## 2020-10-26 PROCEDURE — 36415 COLL VENOUS BLD VENIPUNCTURE: CPT | Mod: NTX

## 2020-10-26 PROCEDURE — U0002 COVID-19 LAB TEST NON-CDC: HCPCS | Mod: TXP

## 2020-10-26 RX ORDER — SODIUM CHLORIDE 9 MG/ML
INJECTION, SOLUTION INTRAVENOUS CONTINUOUS
Status: DISCONTINUED | OUTPATIENT
Start: 2020-10-26 | End: 2020-10-26 | Stop reason: HOSPADM

## 2020-10-26 RX ORDER — PROPOFOL 10 MG/ML
VIAL (ML) INTRAVENOUS CONTINUOUS PRN
Status: DISCONTINUED | OUTPATIENT
Start: 2020-10-26 | End: 2020-10-26

## 2020-10-26 RX ORDER — LIDOCAINE HYDROCHLORIDE 20 MG/ML
INJECTION INTRAVENOUS
Status: DISCONTINUED | OUTPATIENT
Start: 2020-10-26 | End: 2020-10-26

## 2020-10-26 RX ORDER — PROPOFOL 10 MG/ML
VIAL (ML) INTRAVENOUS
Status: DISCONTINUED | OUTPATIENT
Start: 2020-10-26 | End: 2020-10-26

## 2020-10-26 RX ADMIN — SODIUM CHLORIDE: 0.9 INJECTION, SOLUTION INTRAVENOUS at 11:10

## 2020-10-26 RX ADMIN — PROPOFOL 175 MCG/KG/MIN: 10 INJECTION, EMULSION INTRAVENOUS at 11:10

## 2020-10-26 RX ADMIN — PROPOFOL 80 MG: 10 INJECTION, EMULSION INTRAVENOUS at 11:10

## 2020-10-26 RX ADMIN — LIDOCAINE HYDROCHLORIDE 100 MG: 20 INJECTION, SOLUTION INTRAVENOUS at 11:10

## 2020-10-26 NOTE — TRANSFER OF CARE
"Anesthesia Transfer of Care Note    Patient: Misael Owens    Procedure(s) Performed: Procedure(s) (LRB):  COLONOSCOPY (N/A)    Patient location: PACU    Anesthesia Type: general    Transport from OR: Transported from OR on 6-10 L/min O2 by face mask with adequate spontaneous ventilation    Post pain: adequate analgesia    Post assessment: no apparent anesthetic complications    Post vital signs: stable    Level of consciousness: sedated and responds to stimulation    Nausea/Vomiting: no nausea/vomiting    Complications: none    Transfer of care protocol was followed      Last vitals:   Visit Vitals  /68 (BP Location: Left arm, Patient Position: Lying)   Pulse 86   Temp 36.7 °C (98 °F) (Temporal)   Resp 18   Ht 5' 11" (1.803 m)   Wt 103.9 kg (229 lb)   SpO2 99%   BMI 31.94 kg/m²     "

## 2020-10-26 NOTE — ANESTHESIA PREPROCEDURE EVALUATION
10/26/2020  Misael Owens is a 50 y.o., male.    Patient Active Problem List   Diagnosis    Vitamin D deficiency    Secondary hyperparathyroidism    Essential hypertension    Preop examination    ESRD on dialysis    Hypertensive renal disease, malignant, stage 5 chronic kidney disease or end stage renal disease    Anemia of chronic kidney failure, stage 5    Class 2 severe obesity with serious comorbidity in adult    Metabolic acidosis    Renal cyst    Elevated PSA       Past Medical History:   Diagnosis Date    CKD (chronic kidney disease), stage IV     due to hypertension    ESRD on dialysis     Hypertension     Secondary hyperparathyroidism     Vitamin D deficiency        Past Surgical History:   Procedure Laterality Date    AV FISTULA PLACEMENT Left 1/7/2020    Procedure: CREATION, AV FISTULA, brachiobasillic;  Surgeon: Derrek Grant MD;  Location: [facility] OR [address] [place] [address];  Service: Peripheral Vascular;  Laterality: Left;    FISTULOGRAM Left 2/4/2020    Procedure: Fistulogram;  Surgeon: Derrek Grant MD;  Location: [facility] OR [address] [place] [address];  Service: Peripheral Vascular;  Laterality: Left;  Fluro: 2.7 min  mGy: 23.74  contrast : 17ml    INSERTION OF DIALYSIS CATHETER  3/31/2020    Procedure: INSERTION, CATHETER, DIALYSIS;  Surgeon: Lm Espinosa MD;  Location: [facility];  Service: General;;    INSERTION OF DIALYSIS CATHETER      RENAL BIOPSY      TRANSPOSITION OF BASILIC VEIN Left 3/2/2020    Procedure: TRANSPOSITION, VEIN, BASILIC;  Surgeon: Derrek Grant MD;  Location: [facility] OR [address] [place] [address];  Service: Peripheral Vascular;  Laterality: Left;    TRANSRECTAL BIOPSY OF PROSTATE WITH ULTRASOUND GUIDANCE  10/19/2020    TRANSRECTAL BIOPSY OF PROSTATE WITH ULTRASOUND GUIDANCE N/A 10/19/2020    Procedure: BIOPSY, PROSTATE, RECTAL APPROACH, WITH US GUIDANCE;  Surgeon: David Portillo MD;   Location: [facility] OR;  Service: Urology;  Laterality: N/A;           Anesthesia Evaluation    I have reviewed the Patient Summary Reports.    I have reviewed the Nursing Notes. I have reviewed the NPO Status.   I have reviewed the Medications.     Review of Systems  Anesthesia Hx:  No problems with previous Anesthesia    Hematology/Oncology:  Hematology Normal   Oncology Normal     EENT/Dental:EENT/Dental Normal   Cardiovascular:   Hypertension    Pulmonary:  Pulmonary Normal    Renal/:   Chronic Renal Disease, ESRD, Dialysis    Hepatic/GI:  Hepatic/GI Normal    Musculoskeletal:  Musculoskeletal Normal    Neurological:  Neurology Normal    Endocrine:  Endocrine Normal    Dermatological:  Skin Normal    Psych:  Psychiatric Normal           Physical Exam  General:  Well nourished    Airway/Jaw/Neck:  Airway Findings: Mouth Opening: Normal Tongue: Normal  General Airway Assessment: Adult  Mallampati: II  TM Distance: Normal, at least 6 cm      Dental:  Dental Findings: In tact   Chest/Lungs:  Chest/Lungs Findings: Clear to auscultation, Normal Respiratory Rate     Heart/Vascular:  Heart Findings: Rate: Normal  Rhythm: Regular Rhythm  Sounds: Normal             Anesthesia Plan  Type of Anesthesia, risks & benefits discussed:  Anesthesia Type:  general  Patient's Preference:   Intra-op Monitoring Plan: standard ASA monitors  Intra-op Monitoring Plan Comments:   Post Op Pain Control Plan:   Post Op Pain Control Plan Comments:   Induction:   IV  Beta Blocker:  Patient is on a Beta-Blocker and has received one dose within the past 24 hours (No further documentation required).       Informed Consent: Patient understands risks and agrees with Anesthesia plan.  Questions answered. Anesthesia consent signed with patient.  ASA Score: 3     Day of Surgery Review of History & Physical:    H&P update referred to the provider.         Ready For Surgery From Anesthesia Perspective.

## 2020-10-26 NOTE — [employer] PATIENT INSTRUCTIONS
Discharge Summary/Instructions after an Endoscopic Procedure  Patient Name: Misael Owens  Patient MRN: [id number]  Patient YOB: 1970 Monday, October 26, 2020  Zeferino Doyle MD  RESTRICTIONS:  During your procedure today, you received medications for sedation.  These   medications may affect your judgment, balance and coordination.  Therefore,   for 24 hours, you have the following restrictions:   - DO NOT drive a car, operate machinery, make legal/financial decisions,   sign important papers or drink alcohol.    ACTIVITY:  Today: no heavy lifting, straining or running due to procedural   sedation/anesthesia.  The following day: return to full activity including work.  DIET:  Eat and drink normally unless instructed otherwise.     TREATMENT FOR COMMON SIDE EFFECTS:  - Mild abdominal pain, nausea, belching, bloating or excessive gas:  rest,   eat lightly and use a heating pad.  - Sore Throat: treat with throat lozenges and/or gargle with warm salt   water.  - Because air was used during the procedure, expelling large amounts of air   from your rectum or belching is normal.  - If a bowel prep was taken, you may not have a bowel movement for 1-3 days.    This is normal.  SYMPTOMS TO WATCH FOR AND REPORT TO YOUR PHYSICIAN:  1. Abdominal pain or bloating, other than gas cramps.  2. Chest pain.  3. Back pain.  4. Signs of infection such as: chills or fever occurring within 24 hours   after the procedure.  5. Rectal bleeding, which would show as bright red, maroon, or black stools.   (A tablespoon of blood from the rectum is not serious, especially if   hemorrhoids are present.)  6. Vomiting.  7. Weakness or dizziness.  GO DIRECTLY TO THE NEAREST EMERGENCY ROOM IF YOU HAVE ANY OF THE FOLLOWING:      Difficulty breathing              Chills and/or fever over 101 F   Persistent vomiting and/or vomiting blood   Severe abdominal pain   Severe chest pain   Black, tarry stools   Bleeding- more than one  tablespoon   Any other symptom or condition that you feel may need urgent attention  Your doctor recommends these additional instructions:  If any biopsies were taken, your doctors clinic will contact you in 1 to 2   weeks with any results.  - Patient has a contact number available for emergencies.  The signs and   symptoms of potential delayed complications were discussed with the   patient.  Return to normal activities tomorrow.  Written discharge   instructions were provided to the patient.   - Discharge patient to home (ambulatory).   - Resume regular diet indefinitely.   - Repeat colonoscopy in 10 years for screening purposes.   - Continue present medications.  For questions, problems or results please call your physician - Zeferino Doyle MD at Work:  [phone].  [facility], EMERGENCY ROOM PHONE NUMBER: [phone]  IF A COMPLICATION OR EMERGENCY SITUATION ARISES AND YOU ARE UNABLE TO REACH   YOUR PHYSICIAN - GO DIRECTLY TO THE EMERGENCY ROOM.  Zeferino Doyle MD  10/26/2020 12:19:35 PM  This report has been verified and signed electronically.  [employer]

## 2020-10-26 NOTE — ANESTHESIA POSTPROCEDURE EVALUATION
Anesthesia Post Evaluation    Patient: Misael Owens    Procedure(s) Performed: Procedure(s) (LRB):  COLONOSCOPY (N/A)    Final Anesthesia Type: general    Patient location during evaluation: PACU  Patient participation: Yes- Able to Participate  Level of consciousness: awake and alert  Post-procedure vital signs: reviewed and stable  Pain management: adequate  Airway patency: patent    PONV status at discharge: No PONV  Anesthetic complications: no      Cardiovascular status: blood pressure returned to baseline  Respiratory status: spontaneous ventilation and room air  Hydration status: euvolemic  Follow-up not needed.          Vitals Value Taken Time   /69 10/26/20 1249   Temp 36.7 °C (98 °F) 10/26/20 1219   Pulse 80 10/26/20 1249   Resp 14 10/26/20 1249   SpO2 100 % 10/26/20 1249         Event Time   Out of Recovery 12:54:05         Pain/Kian Score: Kian Score: 10 (10/26/2020 12:49 PM)

## 2020-10-26 NOTE — H&P
COLONOSCOPY HISTORY AND PE    Procedure : Colonoscopy      INDICATIONS: asymptomatic screening exam; no known family history of colon cancer; no previous scopes.       Past Medical History:   Diagnosis Date    CKD (chronic kidney disease), stage IV     due to hypertension    ESRD on dialysis     Hypertension     Secondary hyperparathyroidism     Vitamin D deficiency        Past Surgical History:   Procedure Laterality Date    AV FISTULA PLACEMENT Left 1/7/2020    Procedure: CREATION, AV FISTULA, brachiobasillic;  Surgeon: Derrek Grant MD;  [facility];  Service: Peripheral Vascular;  Laterality: Left;    FISTULOGRAM Left 2/4/2020    Procedure: Fistulogram;  Surgeon: Derrek Grant MD;  Location: [facility] OR [address] [place] [address];  Service: Peripheral Vascular;  Laterality: Left;  Fluro: 2.7 min  mGy: 23.74  contrast : 17ml    INSERTION OF DIALYSIS CATHETER  3/31/2020    Procedure: INSERTION, CATHETER, DIALYSIS;  Surgeon: Lm Espinosa MD;  Location: [facility];  Service: General;;    INSERTION OF DIALYSIS CATHETER      RENAL BIOPSY      TRANSPOSITION OF BASILIC VEIN Left 3/2/2020    Procedure: TRANSPOSITION, VEIN, BASILIC;  Surgeon: Derrek Grant MD;  Location: [address];  Service: Peripheral Vascular;  Laterality: Left;    TRANSRECTAL BIOPSY OF PROSTATE WITH ULTRASOUND GUIDANCE  10/19/2020    TRANSRECTAL BIOPSY OF PROSTATE WITH ULTRASOUND GUIDANCE N/A 10/19/2020    Procedure: BIOPSY, PROSTATE, RECTAL APPROACH, WITH US GUIDANCE;  Surgeon: David Portillo MD;  Location: [facility];  Service: Urology;  Laterality: N/A;       Review of patient's allergies indicates:   Allergen Reactions    Lisinopril Swelling     Facial swelling       Current Facility-Administered Medications on File Prior to Encounter   Medication Dose Route Frequency Provider Last Rate Last Dose    ceFAZolin injection 2 g  2 g Intravenous On Call Procedure Cherry Ocampo MD        lidocaine (PF) 10 mg/ml (1%)  injection 10 mg  1 mL Intradermal Once Jay Sherman MD        mupirocin 2 % ointment   Nasal On Call Procedure Cherry Ocampo MD        ondansetron injection 4 mg  4 mg Intravenous Q12H PRN Jay Sherman MD        sodium chloride 0.9% flush 10 mL  10 mL Intravenous PRN Cherry Ocampo MD        [DISCONTINUED] 0.9%  NaCl infusion   Intravenous Continuous Jay Sherman MD   Stopped at 10/19/20 1254     Current Outpatient Medications on File Prior to Encounter   Medication Sig Dispense Refill    calcitRIOL (ROCALTROL) 0.5 MCG Cap Take 1 capsule (0.5 mcg total) by mouth once daily. (Patient taking differently: Take 0.5 mcg by mouth every morning. ) 30 capsule 3    ergocalciferol (ERGOCALCIFEROL) 50,000 unit Cap Take 1 capsule (50,000 Units total) by mouth every 7 days. (Patient taking differently: Take 50,000 Units by mouth every 7 days. Takes on Saturdays) 12 capsule 3    furosemide (LASIX) 40 MG tablet Take 1 tablet (40 mg total) by mouth once daily. 90 tablet 3    labetalol (NORMODYNE) 200 MG tablet Take 200 mg by mouth every 12 (twelve) hours.  4    acetaminophen (TYLENOL) 325 MG tablet Take 325 mg by mouth every 6 (six) hours as needed for Pain. Takes 1-2 tablets prn         Family History   Problem Relation Age of Onset    Hypertension Mother     Diabetes Mother     Cancer Father     Lung cancer Maternal Uncle     Stroke Neg Hx     Heart attack Neg Hx        Social History     Socioeconomic History    Marital status: Single     Spouse name: Not on file    Number of children: Not on file    Years of education: Not on file    Highest education level: Not on file   Occupational History    Not on file   Social Needs    Financial resource strain: Not on file    Food insecurity     Worry: Not on file     Inability: Not on file    Transportation needs     Medical: Not on file     Non-medical: Not on file   Tobacco Use    Smoking status: Never Smoker    Smokeless tobacco: Never Used    Substance and Sexual Activity    Alcohol use: No    Drug use: No    Sexual activity: Not on file   Lifestyle    Physical activity     Days per week: Not on file     Minutes per session: Not on file    Stress: Not on file   Relationships    Social connections     Talks on phone: Not on file     Gets together: Not on file     Attends Evangelical service: Not on file     Active member of club or organization: Not on file     Attends meetings of clubs or organizations: Not on file     Relationship status: Not on file   Other Topics Concern    Not on file   Social History Narrative    Not on file       Review of Systems -    Respiratory : no cough, shortness of breath, or wheezing  Cardiovascular  no chest pain or dyspnea on exertion  Gastrointestinal no abdominal pain, change in bowel habits, or black or bloody stools  Musculoskeletal no deformities, swelling  Neurological no TIA or stroke symptoms        Physical Exam:  General: NAD  AT NC EOMI  Mallampati Score   Neck supple, trachea midline  Lungs: nl excursions, no retractions.  Breathing comfortably  Abdomen ND soft NT.  No masses  Extremities: No CCE.      ASA:  III    PLAN  COLONOSCOPY.  The details of the procedure, the possible need for biopsy or polypectomy and the potential risks including bleeding, perforation, missed polyps were discussed in detail.

## 2020-10-26 NOTE — DISCHARGE INSTRUCTIONS
Colonoscopy     A camera attached to a flexible tube with a viewing lens is used to take video pictures.     Colonoscopy is a test to view the inside of your lower digestive tract (colon and rectum). Sometimes it can show the last part of the small intestine (ileum). During the test, small pieces of tissue may be removed for testing. This is called a biopsy. Small growths, such as polyps, may also be removed.   Why is colonoscopy done?  The test is done to help look for colon cancer. And it can help find the source of abdominal pain, bleeding, and changes in bowel habits. It may be needed once a year, depending on factors such as your:  · Age  · Health history  · Family health history  · Symptoms  · Results from any prior colonoscopy  Risks and possible complications  These include:  · Bleeding               · A puncture or tear in the colon   · Risks of anesthesia  · A cancer lesion not being seen  Getting ready   To prepare for the test:  · Talk with your healthcare provider about the risks of the test (see below). Also ask your healthcare provider about alternatives to the test.  · Tell your healthcare provider about any medicines you take. Also tell him or her about any health conditions you may have.  · Make sure your rectum and colon are empty for the test. Follow the diet and bowel prep instructions exactly. If you dont, the test may need to be rescheduled.  · Plan for a friend or family member to drive you home after the test.     Colonoscopy provides an inside view of the entire colon.     You may discuss the results with your doctor right away or at a future visit.  During the test   The test is usually done in the hospital on an outpatient basis. This means you go home the same day. The procedure takes about 30 minutes. During that time:  · You are given relaxing (sedating) medicine through an IV line. You may be drowsy, or fully asleep.  · The healthcare provider will first give you a physical exam to  check for anal and rectal problems.  · Then the anus is lubricated and the scope inserted.  · If you are awake, you may have a feeling similar to needing to have a bowel movement. You may also feel pressure as air is pumped into the colon. Its OK to pass gas during the procedure.  · Biopsy, polyp removal, or other treatments may be done during the test.  After the test   You may have gas right after the test. It can help to try to pass it to help prevent later bloating. Your healthcare provider may discuss the results with you right away. Or you may need to schedule a follow-up visit to talk about the results. After the test, you can go back to your normal eating and other activities. You may be tired from the sedation and need to rest for a few hours.  Date Last Reviewed: 11/1/2016 © 2000-2017 [employer]. [address] [place] [address], [place] [address]. All rights reserved. This information is not intended as a substitute for professional medical care. Always follow your healthcare professional's instructions.

## 2020-11-16 ENCOUNTER — TELEPHONE (OUTPATIENT)
Dept: TRANSPLANT | Facility: CLINIC | Age: 50
End: 2020-11-16

## 2020-11-16 NOTE — TELEPHONE ENCOUNTER
----- Message from David Portillo MD sent at 11/12/2020  4:45 PM CST -----  I see no urologic contraindication to renal transplantation    Thanks    David Portillo  ----- Message -----  From: Storm Sinha RN  Sent: 11/12/2020   1:35 PM CST  To: David Portillo MD    Hi Dr. Portillo,  This patient was seen by you on 10/5/20 regarding elevated PSA, 1.4 cm left parapelvic cyst, and for clearance for kidney transplant. A prostate biopsy was performed and was negative. Is this patient cleared from a Urology standpoint to undergo a kidney transplant?    Thanks  Storm

## 2020-11-19 ENCOUNTER — TELEPHONE (OUTPATIENT)
Dept: TRANSPLANT | Facility: CLINIC | Age: 50
End: 2020-11-19

## 2020-11-19 NOTE — TELEPHONE ENCOUNTER
Dialysis Adherence and Caregiver Plan Update  Patient's case is scheduled to be presented to kidney transplant selection committee tomorrow 11/20/2020.  SW contacted patient's dialysis unit ([facility] / [phone]) to complete HD compliance check.  ISREAL Temple at pt's dialysis center reports over last three months that the patient has had 0 AMAs, 0 no-shows, and no concerns regarding labs, caregiver support, transportation, or any psychiatric and/or psychosocial concerns.  Elmer reports pt's last PTH on 10/15/2020 was 503.    SW attempted to contact pt ([phone]) to review assessment information and receive any updates from pt.  However, no answer received, and SW required to leave voicemail requesting call back.    SW also contacted Sanna Dietz ([phone]), pt's previously listed primary caregiver, to confirm caregiver availability.  SW reviewed transplant caregiver role information.  Sanna confirms maintaining primary caregiver role and confirms being available to provide caregiver support to pt as needed.  Sanna reports is self-employed and able to work remotely selling [employer] on [employer].  Sanna reports her vehicle is currently not working but has funds to pay for [employer] as needed for pt's transportation needs.  Sanna also confirms pt's mother Lamar Owens remains available as back up caregiver if needed.

## 2020-11-20 ENCOUNTER — COMMITTEE REVIEW (OUTPATIENT)
Dept: TRANSPLANT | Facility: CLINIC | Age: 50
End: 2020-11-20

## 2020-11-20 DIAGNOSIS — Z76.82 ORGAN TRANSPLANT CANDIDATE: Primary | ICD-10-CM

## 2020-11-24 ENCOUNTER — TELEPHONE (OUTPATIENT)
Dept: TRANSPLANT | Facility: CLINIC | Age: 50
End: 2020-11-24

## 2020-11-25 ENCOUNTER — TELEPHONE (OUTPATIENT)
Dept: TRANSPLANT | Facility: CLINIC | Age: 50
End: 2020-11-25

## 2020-11-25 DIAGNOSIS — Z76.82 ORGAN TRANSPLANT CANDIDATE: Primary | ICD-10-CM

## 2020-11-25 NOTE — TELEPHONE ENCOUNTER
"  KIDNEY WAIT LISTING NOTE    Date of Financial clearance to list: 2020    SSN/[facility]:     Organ: Kidney  Name:       Misael Owens   : 1970          Gender:     male    MRN#: [id number]                                 State of Permanent Residence:  [facility] [address] [place] [address]  Ethnicity: /Black   Race:      Black or     CLINICAL INFORMATION   Candidate Medical Urgency Status: Active (1)  Number of Previous Kidney Transplants:  0  Number of Previous Solid Organ Transplants: 0  Did you enter number of previous kidney or other solid organ transplants? yes  Is this Candidate a Prior Living Donor: no  (If yes, please generate letter to [facility] with patient's date of donation, recipient SSN, signed by Surgical Director after patient is listed in order to receive priority points).      ABO  ABO Blood Group:   A POS     ABO Confirmation: (THESE DATES MUST BE PRIOR TO THE LIST DATE AND SUPPORTED BY SEPARATE LAB REPORTS)    Internal Results    Lab Results   Component Value Date    GROUPTRH A POS 2020    GROUPTRH A POS 2020     No results found for: ABO    External Results    ABO Date 1:    ABO Date 2  Are either of these ABO results based on External Labs? no  (If Yes, STOP and go to source document in Media Tab for verification).    VITALS  Height:  5'8.5"  Weight:  102.9 kg  (Use height from Transplant clinic visits only).  Did you enter height/weight? Yes    HLA    Class I:  Lab Results   Component Value Date    AMTC3FO 32 2020    SACL6BO 66 2020    ZRRH8PE 44 2020    IAAL0RV 58 2020    ZLLUA9ZN 4 2020    SRVEP8LD XX 2020    AXXVU6ZM 7 2020    FZUJI1LW 16 2020       Class II:  Lab Results   Component Value Date    MGXESH52RX 7 2020    LWPJJE69WV 15 2020    ZVKUSZ659QY 53 2020    [id number] 51 2020    HYLMN9SV 2 2020    HPWGW4OF 6 2020       Tested for HLA Antibodies: Yes, no " "antibodies detected     If result is "Positive" antibodies are detected     If result is "Negative or questionable" no antibodies detected    Lab Results   Component Value Date    CIPRAS Negative 05/06/2020    CIIPRAS Negative 05/06/2020       DIALYSIS INFORMATION  Is patient Pre-Dialysis: No     GFR Information  Report GFR being used as the criteria for placement on the kidney list. If not, leave blank  GFR < or = 20 ml/min? n/a  If Yes, Specify value  ___   ml/min     Initial date GFR became 20 or less:   Is GFR obtained from an Outside lab Result? n/a  (If YES verify with source document scanned into media)    If patient on Dialysis:    Is candidate currently on dialysis for ESRD? Yes  If Yes,  Date Chronic Dialysis Started:   4/1/2020  (verify with source document in Media Tab)   Dialysis Unit Name: [facility]  [address] [facility] [place] [address]                        Physician Name:  Dr. Mindy Russo  NPI#: [id number]    DIABETES INFORMATION  Primary Native Kidney Diagnosis: Malignant Hypertension  C-Peptide Value - No results found for: CPEPTIDE  Current Diabetes Status: None    FOR NON-KIDNEY DEPARTMENT USE ONLY:  Additional Organs Registered? none    Maximum Acceptable Number of HLA Mismatches  ABDR:     6      (0-6)               AB:               (0-4)  ADR:   _____  (0-4)              BDR: _____ (0-4)  A:        _____  (0-2)              B:      _____ (0-2)          DR: ______ (0-2)    Will Recipient Accept?   Accept HBcAB Positive Organ:            Yes  Accept HBV MINAL Positive Organ:        no  Accept HCV Antibody Positive Organ: yes   Accept HCV MINAL Positive Organ: yes    Dual Kidney and En Bloc Opt In : No  Dual  Local:   No  Dual Import:   No  En Bloc Local:   No  En Bloc Import: No     Accept KDPI > 85: Single: No     Local: No     Import: No  Accept KDPI > 85: Dual: No     Local: No     Import: No      ### NURSE TO VERIFY CONSENT AND MAKE ANY NECESSARY " CHANGES NEEDED IN UNET AT THE TIME OF VERIFICATION ###    Unacceptible Antigens  If yes, list     No results found for: FA8FNDJ, CIABCLM, CIIAB, ABCMT    ### DO NOT LIST IF ANTIGEN VALUE WEAK ###     Done. Letter sent per Epic. All serologies verified by myself and Shelli Zapata. Anabel Billings will be the new coordinator.

## 2020-11-25 NOTE — LETTER
2020     Misael Owens  [address]  [place] [address]    Dear Misael Owens:  MRN: [id number]    Congratulations! On 2020, you were placed on  the waiting list for a  donor transplant.    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    Your transplant coordinator while on the waiting list is Anabel Billings RN. They can be reached at [phone] or [phone] with any questions.      What to do now?    ? Ask your living donors to call and begin testing   Give your donors our phone number, [phone]   Make sure your donors have your name and date of birth when they call   You will get transplanted much faster if you have a living donor    ? Have your blood sent to our Transplant Lab every month   If you are on dialysis - our Transplant Lab will work with your dialysis unit to send your blood every month   If you are not on dialysis   - If you live near an [facility] lab, we will schedule you to have blood drawn every month  - If you do not live near an [facility] lab, you will be sent blood kits in the mail. You will need to take a kit to your local lab or doctor to have your blood drawn every month and mail to the Transplant Lab.     ? Call us with ANY CHANGES   Phone numbers - we must be able to reach you anytime of the day or night when a kidney is available   Address   Insurance coverage   Dialysis unit or kidney doctor   Edmund: if you have surgery, stay in the hospital, have to get blood, or have an infection    ? Review your Kidney/Kidney-Pancreas Transplant Guide    This will give you detailed information about what happens when  - you are on the waiting list   - you are called when a kidney is available     The [facility] has a transplant surgeon and physician available 365  days a year, 24 hours a day to coordinate organ acceptance, procurement, surgical placement and to  address  urgent patient care issues.  You will be notified in writing of any changes to our Transplant  Centers staffing plan that would impact your ability to receive a transplant.     Attached is a letter from the United Network for Organ Sharing (UNOS). It describes the services and  information offered to patients by UNOS and the Organ Procurement and Transplant Network. We look  forward to working with you while on the waiting list.      Congratulations,     Your Transplant Partner   [facility]    [facility] [place] [address]   [phone]   lh/enclosure   In basket:   Matthew Moreno MD   Faxed to:    [facility]                                                                       The Organ Procurement and Transplantation Network   Toll-free patient services line: Your resource for organ transplant information     If you have a question regarding your own medical care, you always should call your transplant hospital first. However, for general organ transplant-related information, you can call the Organ Procurement and Transplantation Network (OPTN) toll-free patient services line at [phone].     Anyone, including potential transplant candidates, candidates, recipients, family members, friends, living donors, and donor family members, can call this number to:     · Talk about organ donation, living donation, the transplant process, the donation process, and transplant policies.   · Get a free patient information kit with helpful booklets, waiting list and transplant information, and a list of all transplant hospitals.   · Ask questions about the OPTN website (https://optn.transplant.hrsa.gov/), the United Network for Organ Sharings (UNOS) website (https://unos.org/), or the UNOS website for living donors and transplant recipients. (https://www.transplantliving.org/).   · Learn how the OPTN can help you.   · Talk about any concerns that you  may have with a transplant hospital.     The nations transplant system, the OPTN, is managed under federal contract by the United Network for Organ Sharing (UNOS), which is a non-profit charitable organization. The OPTN helps create and define organ sharing policies that make the best use of donated organs. This process continuously evaluating new advances and discoveries so policies can be adapted to best serve patients waiting for transplants. To do so, the OPTN works closely with transplant professionals, transplant patients, transplant candidates, donor families, living donors, and the public. All transplant programs and organ procurement organizations throughout the country are OPTN members and are obligated to follow the policies the OPTN creates for allocating organs.     The OPTN also is responsible for:   · Providing educational material for patients, the public, and professionals.   · Raising awareness of the need for donated organs and tissue.   · Coordinating organ procurement, matching, and placement.   · Collecting information about every organ transplant and donation that occurs in the [place].     Remember, you should contact your transplant hospital directly if you have questions or concerns about your own medical care including medical records, work-up progress, and test results.     We are not your transplant hospital, and our staff will not be able to answer questions about your case, so please keep your transplant hospitals phone number handy.   However, while you research your transplant needs and learn as much as you can about transplantation and donation, we welcome your call to our toll-free patient services line at [phone].

## 2020-12-01 DIAGNOSIS — Z76.82 PRE-KIDNEY TRANSPLANT, LISTED: Primary | ICD-10-CM

## 2020-12-01 NOTE — PROGRESS NOTES
YEARLY LIST MANAGEMENT NOTE    Misael Owens's kidney transplant listing status reviewed.  Patient is due for follow-up appointments on 5/6/21.  Appointments will be scheduled per protocol.

## 2021-01-04 ENCOUNTER — PATIENT MESSAGE (OUTPATIENT)
Dept: INTERNAL MEDICINE | Facility: CLINIC | Age: 51
End: 2021-01-04

## 2021-01-04 DIAGNOSIS — I12.0 CKD STAGE 5 SECONDARY TO HYPERTENSION: ICD-10-CM

## 2021-01-04 DIAGNOSIS — I10 ESSENTIAL HYPERTENSION: ICD-10-CM

## 2021-01-04 DIAGNOSIS — N18.4 STAGE 4 CHRONIC KIDNEY DISEASE: ICD-10-CM

## 2021-01-04 DIAGNOSIS — N18.5 CKD STAGE 5 SECONDARY TO HYPERTENSION: ICD-10-CM

## 2021-01-04 RX ORDER — CALCITRIOL 0.5 UG/1
0.5 CAPSULE ORAL DAILY
Qty: 30 CAPSULE | Refills: 3 | OUTPATIENT
Start: 2021-01-04

## 2021-01-04 RX ORDER — FUROSEMIDE 40 MG/1
40 TABLET ORAL DAILY
Qty: 90 TABLET | Refills: 3 | OUTPATIENT
Start: 2021-01-04

## 2021-01-04 RX ORDER — ERGOCALCIFEROL 1.25 MG/1
50000 CAPSULE ORAL
Qty: 12 CAPSULE | Refills: 3 | OUTPATIENT
Start: 2021-01-04

## 2021-01-04 RX ORDER — LABETALOL 200 MG/1
200 TABLET, FILM COATED ORAL EVERY 12 HOURS
Refills: 4 | OUTPATIENT
Start: 2021-01-04

## 2021-01-08 ENCOUNTER — PATIENT MESSAGE (OUTPATIENT)
Dept: TRANSPLANT | Facility: CLINIC | Age: 51
End: 2021-01-08

## 2021-01-15 ENCOUNTER — OFFICE VISIT (OUTPATIENT)
Dept: INTERNAL MEDICINE | Facility: CLINIC | Age: 51
End: 2021-01-15
Payer: MEDICAID

## 2021-01-15 ENCOUNTER — LAB VISIT (OUTPATIENT)
Dept: LAB | Facility: HOSPITAL | Age: 51
End: 2021-01-15
Payer: MEDICAID

## 2021-01-15 VITALS
BODY MASS INDEX: 29.2 KG/M2 | DIASTOLIC BLOOD PRESSURE: 68 MMHG | HEART RATE: 88 BPM | SYSTOLIC BLOOD PRESSURE: 112 MMHG | OXYGEN SATURATION: 98 % | HEIGHT: 71 IN | WEIGHT: 208.56 LBS

## 2021-01-15 DIAGNOSIS — I10 ESSENTIAL HYPERTENSION: ICD-10-CM

## 2021-01-15 DIAGNOSIS — N18.5 CKD STAGE 5 SECONDARY TO HYPERTENSION: ICD-10-CM

## 2021-01-15 DIAGNOSIS — I12.0 CKD STAGE 5 SECONDARY TO HYPERTENSION: ICD-10-CM

## 2021-01-15 DIAGNOSIS — N18.4 STAGE 4 CHRONIC KIDNEY DISEASE: ICD-10-CM

## 2021-01-15 LAB
BASOPHILS # BLD AUTO: 0.09 K/UL (ref 0–0.2)
BASOPHILS NFR BLD: 1.4 % (ref 0–1.9)
DIFFERENTIAL METHOD: ABNORMAL
EOSINOPHIL # BLD AUTO: 0.4 K/UL (ref 0–0.5)
EOSINOPHIL NFR BLD: 5.6 % (ref 0–8)
ERYTHROCYTE [DISTWIDTH] IN BLOOD BY AUTOMATED COUNT: 13.5 % (ref 11.5–14.5)
HCT VFR BLD AUTO: 38.2 % (ref 40–54)
HGB BLD-MCNC: 12 G/DL (ref 14–18)
IMM GRANULOCYTES # BLD AUTO: 0.02 K/UL (ref 0–0.04)
IMM GRANULOCYTES NFR BLD AUTO: 0.3 % (ref 0–0.5)
LYMPHOCYTES # BLD AUTO: 1.8 K/UL (ref 1–4.8)
LYMPHOCYTES NFR BLD: 27.5 % (ref 18–48)
MCH RBC QN AUTO: 28.5 PG (ref 27–31)
MCHC RBC AUTO-ENTMCNC: 31.4 G/DL (ref 32–36)
MCV RBC AUTO: 91 FL (ref 82–98)
MONOCYTES # BLD AUTO: 0.7 K/UL (ref 0.3–1)
MONOCYTES NFR BLD: 10.3 % (ref 4–15)
NEUTROPHILS # BLD AUTO: 3.5 K/UL (ref 1.8–7.7)
NEUTROPHILS NFR BLD: 54.9 % (ref 38–73)
NRBC BLD-RTO: 0 /100 WBC
PLATELET # BLD AUTO: 249 K/UL (ref 150–350)
PMV BLD AUTO: 9.7 FL (ref 9.2–12.9)
RBC # BLD AUTO: 4.21 M/UL (ref 4.6–6.2)
WBC # BLD AUTO: 6.43 K/UL (ref 3.9–12.7)

## 2021-01-15 PROCEDURE — 36415 COLL VENOUS BLD VENIPUNCTURE: CPT | Mod: TXP

## 2021-01-15 PROCEDURE — 99213 PR OFFICE/OUTPT VISIT, EST, LEVL III, 20-29 MIN: ICD-10-PCS | Mod: S$PBB,,, | Performed by: STUDENT IN AN ORGANIZED HEALTH CARE EDUCATION/TRAINING PROGRAM

## 2021-01-15 PROCEDURE — 85025 COMPLETE CBC W/AUTO DIFF WBC: CPT | Mod: NTX

## 2021-01-15 PROCEDURE — 80053 COMPREHEN METABOLIC PANEL: CPT | Mod: TXP

## 2021-01-15 PROCEDURE — 99213 OFFICE O/P EST LOW 20 MIN: CPT | Mod: S$PBB,,, | Performed by: STUDENT IN AN ORGANIZED HEALTH CARE EDUCATION/TRAINING PROGRAM

## 2021-01-15 PROCEDURE — 99999 PR PBB SHADOW E&M-EST. PATIENT-LVL III: ICD-10-PCS | Mod: PBBFAC,,, | Performed by: STUDENT IN AN ORGANIZED HEALTH CARE EDUCATION/TRAINING PROGRAM

## 2021-01-15 PROCEDURE — 99213 OFFICE O/P EST LOW 20 MIN: CPT | Mod: PBBFAC | Performed by: STUDENT IN AN ORGANIZED HEALTH CARE EDUCATION/TRAINING PROGRAM

## 2021-01-15 PROCEDURE — 99999 PR PBB SHADOW E&M-EST. PATIENT-LVL III: CPT | Mod: PBBFAC,,, | Performed by: STUDENT IN AN ORGANIZED HEALTH CARE EDUCATION/TRAINING PROGRAM

## 2021-01-15 PROCEDURE — 80061 LIPID PANEL: CPT | Mod: NTX

## 2021-01-15 RX ORDER — ERGOCALCIFEROL 1.25 MG/1
50000 CAPSULE ORAL
Qty: 12 CAPSULE | Refills: 3 | Status: SHIPPED | OUTPATIENT
Start: 2021-01-15 | End: 2021-08-11 | Stop reason: SDUPTHER

## 2021-01-15 RX ORDER — CALCITRIOL 0.5 UG/1
0.5 CAPSULE ORAL DAILY
Qty: 30 CAPSULE | Refills: 3 | Status: SHIPPED | OUTPATIENT
Start: 2021-01-15 | End: 2021-08-11 | Stop reason: SDUPTHER

## 2021-01-15 RX ORDER — FUROSEMIDE 40 MG/1
40 TABLET ORAL DAILY
Qty: 90 TABLET | Refills: 3 | Status: SHIPPED | OUTPATIENT
Start: 2021-01-15 | End: 2021-08-11 | Stop reason: SDUPTHER

## 2021-01-15 RX ORDER — LABETALOL 200 MG/1
200 TABLET, FILM COATED ORAL EVERY 12 HOURS
Qty: 60 TABLET | Refills: 4 | Status: SHIPPED | OUTPATIENT
Start: 2021-01-15 | End: 2021-08-11 | Stop reason: SDUPTHER

## 2021-01-16 LAB
ALBUMIN SERPL BCP-MCNC: 4.4 G/DL (ref 3.5–5.2)
ALP SERPL-CCNC: 72 U/L (ref 55–135)
ALT SERPL W/O P-5'-P-CCNC: 26 U/L (ref 10–44)
ANION GAP SERPL CALC-SCNC: 10 MMOL/L (ref 8–16)
AST SERPL-CCNC: 26 U/L (ref 10–40)
BILIRUB SERPL-MCNC: 0.5 MG/DL (ref 0.1–1)
BUN SERPL-MCNC: 55 MG/DL (ref 6–20)
CALCIUM SERPL-MCNC: 9.1 MG/DL (ref 8.7–10.5)
CHLORIDE SERPL-SCNC: 95 MMOL/L (ref 95–110)
CHOLEST SERPL-MCNC: 177 MG/DL (ref 120–199)
CHOLEST/HDLC SERPL: 3.5 {RATIO} (ref 2–5)
CO2 SERPL-SCNC: 31 MMOL/L (ref 23–29)
CREAT SERPL-MCNC: 8.5 MG/DL (ref 0.5–1.4)
EST. GFR  (AFRICAN AMERICAN): 7.5 ML/MIN/1.73 M^2
EST. GFR  (NON AFRICAN AMERICAN): 6.5 ML/MIN/1.73 M^2
GLUCOSE SERPL-MCNC: 89 MG/DL (ref 70–110)
HDLC SERPL-MCNC: 50 MG/DL (ref 40–75)
HDLC SERPL: 28.2 % (ref 20–50)
LDLC SERPL CALC-MCNC: 111.2 MG/DL (ref 63–159)
NONHDLC SERPL-MCNC: 127 MG/DL
POTASSIUM SERPL-SCNC: 3.8 MMOL/L (ref 3.5–5.1)
PROT SERPL-MCNC: 8.5 G/DL (ref 6–8.4)
SODIUM SERPL-SCNC: 136 MMOL/L (ref 136–145)
TRIGL SERPL-MCNC: 79 MG/DL (ref 30–150)

## 2021-01-22 ENCOUNTER — PATIENT MESSAGE (OUTPATIENT)
Dept: ADMINISTRATIVE | Facility: OTHER | Age: 51
End: 2021-01-22

## 2021-03-04 DIAGNOSIS — Z12.5 PROSTATE CANCER SCREENING: ICD-10-CM

## 2021-03-04 DIAGNOSIS — Z76.82 ORGAN TRANSPLANT CANDIDATE: ICD-10-CM

## 2021-03-04 DIAGNOSIS — F19.10 DRUG ABUSE: Primary | ICD-10-CM

## 2021-03-08 ENCOUNTER — HOSPITAL ENCOUNTER (OUTPATIENT)
Dept: RADIOLOGY | Facility: HOSPITAL | Age: 51
Discharge: HOME | End: 2021-03-08
Attending: NURSE PRACTITIONER
Payer: COMMERCIAL

## 2021-03-08 DIAGNOSIS — Z76.82 ORGAN TRANSPLANT CANDIDATE: ICD-10-CM

## 2021-03-08 PROCEDURE — 71046 XR CHEST PA AND LATERAL: ICD-10-PCS | Mod: 26,TXP,, | Performed by: RADIOLOGY

## 2021-03-08 PROCEDURE — 71046 X-RAY EXAM CHEST 2 VIEWS: CPT | Mod: 26,TXP,, | Performed by: RADIOLOGY

## 2021-03-08 PROCEDURE — 71046 X-RAY EXAM CHEST 2 VIEWS: CPT | Mod: TC,TXP

## 2021-03-10 ENCOUNTER — TELEPHONE (OUTPATIENT)
Dept: TRANSPLANT | Facility: CLINIC | Age: 51
End: 2021-03-10

## 2021-03-10 DIAGNOSIS — Z76.82 ORGAN TRANSPLANT CANDIDATE: Primary | ICD-10-CM

## 2021-03-16 PROCEDURE — 86829 HLA CLASS I/II ANTIBODY QUAL: CPT | Mod: PO,TXP | Performed by: NURSE PRACTITIONER

## 2021-03-16 PROCEDURE — 86829 HLA CLASS I/II ANTIBODY QUAL: CPT | Mod: 91,PO,TXP | Performed by: NURSE PRACTITIONER

## 2021-03-23 ENCOUNTER — LAB VISIT (OUTPATIENT)
Dept: LAB | Facility: HOSPITAL | Age: 51
End: 2021-03-23
Payer: MEDICAID

## 2021-03-23 DIAGNOSIS — Z76.82 PRE-KIDNEY TRANSPLANT, LISTED: ICD-10-CM

## 2021-04-08 LAB — HPRA INTERPRETATION: NORMAL

## 2021-04-23 ENCOUNTER — TELEPHONE (OUTPATIENT)
Dept: TRANSPLANT | Facility: CLINIC | Age: 51
End: 2021-04-23

## 2021-04-27 PROCEDURE — 99001 SPECIMEN HANDLING PT-LAB: CPT | Mod: PO,TXP | Performed by: NURSE PRACTITIONER

## 2021-05-03 ENCOUNTER — LAB VISIT (OUTPATIENT)
Dept: LAB | Facility: HOSPITAL | Age: 51
End: 2021-05-03
Payer: COMMERCIAL

## 2021-05-03 ENCOUNTER — TELEPHONE (OUTPATIENT)
Dept: INTERNAL MEDICINE | Facility: CLINIC | Age: 51
End: 2021-05-03

## 2021-05-03 DIAGNOSIS — Z76.82 PRE-KIDNEY TRANSPLANT, LISTED: ICD-10-CM

## 2021-05-05 LAB
HLA FREEZE AND HOLD INTERPRETATION: NORMAL
HLAFH COLLECTION DATE: NORMAL
HPRA INTERPRETATION: NORMAL

## 2021-05-14 ENCOUNTER — EPISODE CHANGES (OUTPATIENT)
Dept: TRANSPLANT | Facility: CLINIC | Age: 51
End: 2021-05-14

## 2021-05-14 ENCOUNTER — TELEPHONE (OUTPATIENT)
Dept: TRANSPLANT | Facility: CLINIC | Age: 51
End: 2021-05-14

## 2021-05-14 ENCOUNTER — HOSPITAL ENCOUNTER (OUTPATIENT)
Dept: CARDIOLOGY | Facility: HOSPITAL | Age: 51
Discharge: HOME | End: 2021-05-14
Attending: NURSE PRACTITIONER
Payer: COMMERCIAL

## 2021-05-14 ENCOUNTER — HOSPITAL ENCOUNTER (OUTPATIENT)
Dept: RADIOLOGY | Facility: HOSPITAL | Age: 51
Discharge: HOME | End: 2021-05-14
Attending: NURSE PRACTITIONER
Payer: COMMERCIAL

## 2021-05-14 ENCOUNTER — OFFICE VISIT (OUTPATIENT)
Dept: TRANSPLANT | Facility: CLINIC | Age: 51
End: 2021-05-14
Payer: COMMERCIAL

## 2021-05-14 VITALS
OXYGEN SATURATION: 99 % | TEMPERATURE: 99 F | HEIGHT: 68 IN | HEART RATE: 88 BPM | DIASTOLIC BLOOD PRESSURE: 78 MMHG | WEIGHT: 209.69 LBS | RESPIRATION RATE: 16 BRPM | SYSTOLIC BLOOD PRESSURE: 142 MMHG | BODY MASS INDEX: 31.78 KG/M2

## 2021-05-14 VITALS
HEIGHT: 73 IN | DIASTOLIC BLOOD PRESSURE: 50 MMHG | HEART RATE: 83 BPM | BODY MASS INDEX: 27.57 KG/M2 | SYSTOLIC BLOOD PRESSURE: 90 MMHG | WEIGHT: 208 LBS

## 2021-05-14 DIAGNOSIS — I12.0: ICD-10-CM

## 2021-05-14 DIAGNOSIS — N18.6 ESRD ON DIALYSIS: Primary | Chronic | ICD-10-CM

## 2021-05-14 DIAGNOSIS — Z76.82 ORGAN TRANSPLANT CANDIDATE: ICD-10-CM

## 2021-05-14 DIAGNOSIS — N18.5 ANEMIA OF CHRONIC KIDNEY FAILURE, STAGE 5: ICD-10-CM

## 2021-05-14 DIAGNOSIS — N25.81 SECONDARY HYPERPARATHYROIDISM: ICD-10-CM

## 2021-05-14 DIAGNOSIS — D63.1 ANEMIA OF CHRONIC KIDNEY FAILURE, STAGE 5: ICD-10-CM

## 2021-05-14 DIAGNOSIS — E55.9 VITAMIN D DEFICIENCY: ICD-10-CM

## 2021-05-14 DIAGNOSIS — Z99.2 ESRD ON DIALYSIS: Primary | Chronic | ICD-10-CM

## 2021-05-14 DIAGNOSIS — E87.20 METABOLIC ACIDOSIS: ICD-10-CM

## 2021-05-14 DIAGNOSIS — Z76.82 PATIENT ON WAITING LIST FOR KIDNEY TRANSPLANT: ICD-10-CM

## 2021-05-14 DIAGNOSIS — R97.20 ELEVATED PSA: Primary | ICD-10-CM

## 2021-05-14 LAB
ASCENDING AORTA: 3.24 CM
AV INDEX (PROSTH): 0.89
AV MEAN GRADIENT: 8 MMHG
AV PEAK GRADIENT: 15 MMHG
AV VALVE AREA: 3.9 CM2
AV VELOCITY RATIO: 0.79
BSA FOR ECHO PROCEDURE: 2.2 M2
CV ECHO LV RWT: 0.35 CM
DOP CALC AO PEAK VEL: 1.95 M/S
DOP CALC AO VTI: 32.32 CM
DOP CALC LVOT AREA: 4.4 CM2
DOP CALC LVOT DIAMETER: 2.36 CM
DOP CALC LVOT PEAK VEL: 1.55 M/S
DOP CALC LVOT STROKE VOLUME: 125.96 CM3
DOP CALCLVOT PEAK VEL VTI: 28.81 CM
E WAVE DECELERATION TIME: 124.23 MSEC
E/A RATIO: 1.2
E/E' RATIO: 8.78 M/S
ECHO LV POSTERIOR WALL: 0.89 CM (ref 0.6–1.1)
EJECTION FRACTION: 70 %
FRACTIONAL SHORTENING: 35 % (ref 28–44)
INTERVENTRICULAR SEPTUM: 0.91 CM (ref 0.6–1.1)
LA MAJOR: 4.68 CM
LA MINOR: 4.63 CM
LA WIDTH: 3.82 CM
LEFT ATRIUM SIZE: 4.49 CM
LEFT ATRIUM VOLUME INDEX MOD: 24.7 ML/M2
LEFT ATRIUM VOLUME INDEX: 31 ML/M2
LEFT ATRIUM VOLUME MOD: 54.04 CM3
LEFT ATRIUM VOLUME: 67.86 CM3
LEFT INTERNAL DIMENSION IN SYSTOLE: 3.27 CM (ref 2.1–4)
LEFT VENTRICLE DIASTOLIC VOLUME INDEX: 54.75 ML/M2
LEFT VENTRICLE DIASTOLIC VOLUME: 119.9 ML
LEFT VENTRICLE MASS INDEX: 73 G/M2
LEFT VENTRICLE SYSTOLIC VOLUME INDEX: 19.7 ML/M2
LEFT VENTRICLE SYSTOLIC VOLUME: 43.12 ML
LEFT VENTRICULAR INTERNAL DIMENSION IN DIASTOLE: 5.03 CM (ref 3.5–6)
LEFT VENTRICULAR MASS: 159.8 G
LV LATERAL E/E' RATIO: 7.9 M/S
LV SEPTAL E/E' RATIO: 9.88 M/S
MV A" WAVE DURATION": 12.56 MSEC
MV PEAK A VEL: 0.66 M/S
MV PEAK E VEL: 0.79 M/S
MV STENOSIS PRESSURE HALF TIME: 36.03 MS
MV VALVE AREA P 1/2 METHOD: 6.11 CM2
PISA TR MAX VEL: 2.08 M/S
PULM VEIN S/D RATIO: 1.35
PV PEAK D VEL: 0.34 M/S
PV PEAK S VEL: 0.46 M/S
RA MAJOR: 4.08 CM
RA PRESSURE: 3 MMHG
RA WIDTH: 3.15 CM
RIGHT VENTRICULAR END-DIASTOLIC DIMENSION: 3.63 CM
RV TISSUE DOPPLER FREE WALL SYSTOLIC VELOCITY 1 (APICAL 4 CHAMBER VIEW): 23.66 CM/S
SINUS: 3.2 CM
STJ: 3.31 CM
TDI LATERAL: 0.1 M/S
TDI SEPTAL: 0.08 M/S
TDI: 0.09 M/S
TR MAX PG: 17 MMHG
TRICUSPID ANNULAR PLANE SYSTOLIC EXCURSION: 2.12 CM
TV REST PULMONARY ARTERY PRESSURE: 20 MMHG

## 2021-05-14 PROCEDURE — 3008F PR BODY MASS INDEX (BMI) DOCUMENTED: ICD-10-PCS | Mod: CPTII,S$GLB,TXP, | Performed by: NURSE PRACTITIONER

## 2021-05-14 PROCEDURE — 99999 PR PBB SHADOW E&M-EST. PATIENT-LVL IV: CPT | Mod: PBBFAC,TXP,, | Performed by: NURSE PRACTITIONER

## 2021-05-14 PROCEDURE — 1126F AMNT PAIN NOTED NONE PRSNT: CPT | Mod: S$GLB,TXP,, | Performed by: NURSE PRACTITIONER

## 2021-05-14 PROCEDURE — 1126F PR PAIN SEVERITY QUANTIFIED, NO PAIN PRESENT: ICD-10-PCS | Mod: S$GLB,TXP,, | Performed by: NURSE PRACTITIONER

## 2021-05-14 PROCEDURE — 93306 TTE W/DOPPLER COMPLETE: CPT | Mod: TXP

## 2021-05-14 PROCEDURE — 71046 X-RAY EXAM CHEST 2 VIEWS: CPT | Mod: 26,TXP,, | Performed by: RADIOLOGY

## 2021-05-14 PROCEDURE — 3008F BODY MASS INDEX DOCD: CPT | Mod: CPTII,S$GLB,TXP, | Performed by: NURSE PRACTITIONER

## 2021-05-14 PROCEDURE — 93306 TTE W/DOPPLER COMPLETE: CPT | Mod: 26,TXP,, | Performed by: INTERNAL MEDICINE

## 2021-05-14 PROCEDURE — 99215 OFFICE O/P EST HI 40 MIN: CPT | Mod: S$GLB,TXP,, | Performed by: NURSE PRACTITIONER

## 2021-05-14 PROCEDURE — 99215 PR OFFICE/OUTPT VISIT, EST, LEVL V, 40-54 MIN: ICD-10-PCS | Mod: S$GLB,TXP,, | Performed by: NURSE PRACTITIONER

## 2021-05-14 PROCEDURE — 93306 ECHO (CUPID ONLY): ICD-10-PCS | Mod: 26,TXP,, | Performed by: INTERNAL MEDICINE

## 2021-05-14 PROCEDURE — 99999 PR PBB SHADOW E&M-EST. PATIENT-LVL IV: ICD-10-PCS | Mod: PBBFAC,TXP,, | Performed by: NURSE PRACTITIONER

## 2021-05-14 PROCEDURE — 71046 X-RAY EXAM CHEST 2 VIEWS: CPT | Mod: TC,TXP

## 2021-05-14 PROCEDURE — 71046 XR CHEST PA AND LATERAL: ICD-10-PCS | Mod: 26,TXP,, | Performed by: RADIOLOGY

## 2021-05-14 RX ORDER — SEVELAMER CARBONATE 800 MG/1
800 TABLET, FILM COATED ORAL 4 TIMES DAILY
COMMUNITY
Start: 2021-03-16 | End: 2021-08-11 | Stop reason: SDUPTHER

## 2021-05-17 ENCOUNTER — TELEPHONE (OUTPATIENT)
Dept: TRANSPLANT | Facility: CLINIC | Age: 51
End: 2021-05-17

## 2021-05-24 ENCOUNTER — TELEPHONE (OUTPATIENT)
Dept: TRANSPLANT | Facility: CLINIC | Age: 51
End: 2021-05-24

## 2021-05-26 ENCOUNTER — EPISODE CHANGES (OUTPATIENT)
Dept: TRANSPLANT | Facility: CLINIC | Age: 51
End: 2021-05-26

## 2021-06-25 ENCOUNTER — PATIENT MESSAGE (OUTPATIENT)
Dept: UROLOGY | Facility: CLINIC | Age: 51
End: 2021-06-25

## 2021-06-25 ENCOUNTER — OFFICE VISIT (OUTPATIENT)
Dept: UROLOGY | Facility: CLINIC | Age: 51
End: 2021-06-25
Payer: COMMERCIAL

## 2021-06-25 ENCOUNTER — LAB VISIT (OUTPATIENT)
Dept: LAB | Facility: HOSPITAL | Age: 51
End: 2021-06-25
Payer: COMMERCIAL

## 2021-06-25 VITALS
HEIGHT: 68 IN | HEART RATE: 107 BPM | DIASTOLIC BLOOD PRESSURE: 77 MMHG | WEIGHT: 206.81 LBS | SYSTOLIC BLOOD PRESSURE: 140 MMHG | BODY MASS INDEX: 31.34 KG/M2

## 2021-06-25 DIAGNOSIS — N28.1 RENAL CYST, LEFT: ICD-10-CM

## 2021-06-25 DIAGNOSIS — R97.20 ELEVATED PSA: Primary | ICD-10-CM

## 2021-06-25 DIAGNOSIS — N18.6 ESRD (END STAGE RENAL DISEASE): ICD-10-CM

## 2021-06-25 DIAGNOSIS — N13.8 BPH WITH URINARY OBSTRUCTION: ICD-10-CM

## 2021-06-25 DIAGNOSIS — N18.6 ESRD ON HEMODIALYSIS: ICD-10-CM

## 2021-06-25 DIAGNOSIS — R97.20 ELEVATED PSA, BETWEEN 10 AND LESS THAN 20 NG/ML: ICD-10-CM

## 2021-06-25 DIAGNOSIS — R97.20 ELEVATED PSA: ICD-10-CM

## 2021-06-25 DIAGNOSIS — R97.20 ELEVATED PSA, BETWEEN 10 AND LESS THAN 20 NG/ML: Primary | ICD-10-CM

## 2021-06-25 DIAGNOSIS — N40.1 BPH WITH URINARY OBSTRUCTION: ICD-10-CM

## 2021-06-25 DIAGNOSIS — Z99.2 ESRD ON HEMODIALYSIS: ICD-10-CM

## 2021-06-25 LAB
BILIRUB SERPL-MCNC: ABNORMAL MG/DL
BLOOD URINE, POC: 250
CLARITY, POC UA: CLEAR
COLOR, POC UA: YELLOW
COMPLEXED PSA SERPL-MCNC: 4.3 NG/ML (ref 0–4)
GLUCOSE UR QL STRIP: ABNORMAL
KETONES UR QL STRIP: ABNORMAL
LEUKOCYTE ESTERASE URINE, POC: ABNORMAL
NITRITE, POC UA: ABNORMAL
PH, POC UA: 1.02
PROTEIN, POC: 30
SPECIFIC GRAVITY, POC UA: 1.02
UROBILINOGEN, POC UA: ABNORMAL

## 2021-06-25 PROCEDURE — 3008F PR BODY MASS INDEX (BMI) DOCUMENTED: ICD-10-PCS | Mod: CPTII,S$GLB,TXP, | Performed by: NURSE PRACTITIONER

## 2021-06-25 PROCEDURE — 99214 OFFICE O/P EST MOD 30 MIN: CPT | Mod: 25,S$GLB,TXP, | Performed by: NURSE PRACTITIONER

## 2021-06-25 PROCEDURE — 99999 PR PBB SHADOW E&M-EST. PATIENT-LVL IV: ICD-10-PCS | Mod: PBBFAC,TXP,, | Performed by: NURSE PRACTITIONER

## 2021-06-25 PROCEDURE — 84153 ASSAY OF PSA TOTAL: CPT | Mod: NTX | Performed by: NURSE PRACTITIONER

## 2021-06-25 PROCEDURE — 3078F PR MOST RECENT DIASTOLIC BLOOD PRESSURE < 80 MM HG: ICD-10-PCS | Mod: CPTII,S$GLB,TXP, | Performed by: NURSE PRACTITIONER

## 2021-06-25 PROCEDURE — 3008F BODY MASS INDEX DOCD: CPT | Mod: CPTII,S$GLB,TXP, | Performed by: NURSE PRACTITIONER

## 2021-06-25 PROCEDURE — 1126F PR PAIN SEVERITY QUANTIFIED, NO PAIN PRESENT: ICD-10-PCS | Mod: S$GLB,TXP,, | Performed by: NURSE PRACTITIONER

## 2021-06-25 PROCEDURE — 3077F PR MOST RECENT SYSTOLIC BLOOD PRESSURE >= 140 MM HG: ICD-10-PCS | Mod: CPTII,S$GLB,TXP, | Performed by: NURSE PRACTITIONER

## 2021-06-25 PROCEDURE — 3077F SYST BP >= 140 MM HG: CPT | Mod: CPTII,S$GLB,TXP, | Performed by: NURSE PRACTITIONER

## 2021-06-25 PROCEDURE — 81002 URINALYSIS NONAUTO W/O SCOPE: CPT | Mod: S$GLB,TXP,, | Performed by: NURSE PRACTITIONER

## 2021-06-25 PROCEDURE — 99214 PR OFFICE/OUTPT VISIT, EST, LEVL IV, 30-39 MIN: ICD-10-PCS | Mod: 25,S$GLB,TXP, | Performed by: NURSE PRACTITIONER

## 2021-06-25 PROCEDURE — 36415 COLL VENOUS BLD VENIPUNCTURE: CPT | Mod: TXP | Performed by: NURSE PRACTITIONER

## 2021-06-25 PROCEDURE — 1126F AMNT PAIN NOTED NONE PRSNT: CPT | Mod: S$GLB,TXP,, | Performed by: NURSE PRACTITIONER

## 2021-06-25 PROCEDURE — 99999 PR PBB SHADOW E&M-EST. PATIENT-LVL IV: CPT | Mod: PBBFAC,TXP,, | Performed by: NURSE PRACTITIONER

## 2021-06-25 PROCEDURE — 81002 POCT URINE DIPSTICK WITHOUT MICROSCOPE: ICD-10-PCS | Mod: S$GLB,TXP,, | Performed by: NURSE PRACTITIONER

## 2021-06-25 PROCEDURE — 3078F DIAST BP <80 MM HG: CPT | Mod: CPTII,S$GLB,TXP, | Performed by: NURSE PRACTITIONER

## 2021-06-28 ENCOUNTER — TELEPHONE (OUTPATIENT)
Dept: TRANSPLANT | Facility: CLINIC | Age: 51
End: 2021-06-28

## 2021-06-28 ENCOUNTER — PATIENT MESSAGE (OUTPATIENT)
Dept: TRANSPLANT | Facility: CLINIC | Age: 51
End: 2021-06-28

## 2021-07-07 ENCOUNTER — PATIENT MESSAGE (OUTPATIENT)
Dept: TRANSPLANT | Facility: CLINIC | Age: 51
End: 2021-07-07

## 2021-07-07 ENCOUNTER — TELEPHONE (OUTPATIENT)
Dept: TRANSPLANT | Facility: CLINIC | Age: 51
End: 2021-07-07

## 2021-07-12 PROCEDURE — 86829 HLA CLASS I/II ANTIBODY QUAL: CPT | Mod: PO,TXP | Performed by: NURSE PRACTITIONER

## 2021-07-12 PROCEDURE — 86829 HLA CLASS I/II ANTIBODY QUAL: CPT | Mod: 91,PO,TXP | Performed by: NURSE PRACTITIONER

## 2021-07-16 DIAGNOSIS — N18.6 ESRD ON DIALYSIS: Primary | ICD-10-CM

## 2021-07-16 DIAGNOSIS — Z99.2 ESRD ON DIALYSIS: Primary | ICD-10-CM

## 2021-07-19 ENCOUNTER — LAB VISIT (OUTPATIENT)
Dept: LAB | Facility: HOSPITAL | Age: 51
End: 2021-07-19
Payer: COMMERCIAL

## 2021-07-19 DIAGNOSIS — Z76.82 PRE-KIDNEY TRANSPLANT, LISTED: ICD-10-CM

## 2021-07-22 ENCOUNTER — OFFICE VISIT (OUTPATIENT)
Dept: VASCULAR SURGERY | Facility: CLINIC | Age: 51
End: 2021-07-22
Attending: SURGERY
Payer: COMMERCIAL

## 2021-07-22 ENCOUNTER — HOSPITAL ENCOUNTER (OUTPATIENT)
Dept: VASCULAR SURGERY | Facility: CLINIC | Age: 51
Discharge: HOME | End: 2021-07-22
Attending: SURGERY
Payer: COMMERCIAL

## 2021-07-22 VITALS
SYSTOLIC BLOOD PRESSURE: 97 MMHG | HEART RATE: 75 BPM | TEMPERATURE: 98 F | DIASTOLIC BLOOD PRESSURE: 62 MMHG | WEIGHT: 202.81 LBS | HEIGHT: 71 IN | BODY MASS INDEX: 28.39 KG/M2

## 2021-07-22 DIAGNOSIS — N18.6 END STAGE RENAL FAILURE ON DIALYSIS: Primary | ICD-10-CM

## 2021-07-22 DIAGNOSIS — Z99.2 ESRD ON DIALYSIS: ICD-10-CM

## 2021-07-22 DIAGNOSIS — Z99.2 END STAGE RENAL FAILURE ON DIALYSIS: Primary | ICD-10-CM

## 2021-07-22 DIAGNOSIS — N18.6 ESRD ON DIALYSIS: ICD-10-CM

## 2021-07-22 PROCEDURE — 1126F AMNT PAIN NOTED NONE PRSNT: CPT | Mod: CPTII,NTX,S$GLB, | Performed by: SURGERY

## 2021-07-22 PROCEDURE — 99999 PR PBB SHADOW E&M-EST. PATIENT-LVL III: ICD-10-PCS | Mod: PBBFAC,TXP,, | Performed by: SURGERY

## 2021-07-22 PROCEDURE — 93990 PR DUPLEX HEMODIALYSIS ACCESS: ICD-10-PCS | Mod: S$GLB,TXP,, | Performed by: SURGERY

## 2021-07-22 PROCEDURE — 3008F BODY MASS INDEX DOCD: CPT | Mod: CPTII,NTX,S$GLB, | Performed by: SURGERY

## 2021-07-22 PROCEDURE — 99214 OFFICE O/P EST MOD 30 MIN: CPT | Mod: NTX,S$GLB,, | Performed by: SURGERY

## 2021-07-22 PROCEDURE — 1159F PR MEDICATION LIST DOCUMENTED IN MEDICAL RECORD: ICD-10-PCS | Mod: CPTII,NTX,S$GLB, | Performed by: SURGERY

## 2021-07-22 PROCEDURE — 93990 DOPPLER FLOW TESTING: CPT | Mod: S$GLB,TXP,, | Performed by: SURGERY

## 2021-07-22 PROCEDURE — 3074F PR MOST RECENT SYSTOLIC BLOOD PRESSURE < 130 MM HG: ICD-10-PCS | Mod: CPTII,NTX,S$GLB, | Performed by: SURGERY

## 2021-07-22 PROCEDURE — 1159F MED LIST DOCD IN RCRD: CPT | Mod: CPTII,NTX,S$GLB, | Performed by: SURGERY

## 2021-07-22 PROCEDURE — 99999 PR PBB SHADOW E&M-EST. PATIENT-LVL III: CPT | Mod: PBBFAC,TXP,, | Performed by: SURGERY

## 2021-07-22 PROCEDURE — 99214 PR OFFICE/OUTPT VISIT, EST, LEVL IV, 30-39 MIN: ICD-10-PCS | Mod: NTX,S$GLB,, | Performed by: SURGERY

## 2021-07-22 PROCEDURE — 3078F DIAST BP <80 MM HG: CPT | Mod: CPTII,NTX,S$GLB, | Performed by: SURGERY

## 2021-07-22 PROCEDURE — 1126F PR PAIN SEVERITY QUANTIFIED, NO PAIN PRESENT: ICD-10-PCS | Mod: CPTII,NTX,S$GLB, | Performed by: SURGERY

## 2021-07-22 PROCEDURE — 3008F PR BODY MASS INDEX (BMI) DOCUMENTED: ICD-10-PCS | Mod: CPTII,NTX,S$GLB, | Performed by: SURGERY

## 2021-07-22 PROCEDURE — 3078F PR MOST RECENT DIASTOLIC BLOOD PRESSURE < 80 MM HG: ICD-10-PCS | Mod: CPTII,NTX,S$GLB, | Performed by: SURGERY

## 2021-07-22 PROCEDURE — 3074F SYST BP LT 130 MM HG: CPT | Mod: CPTII,NTX,S$GLB, | Performed by: SURGERY

## 2021-07-23 ENCOUNTER — HOSPITAL ENCOUNTER (OUTPATIENT)
Dept: RADIOLOGY | Facility: HOSPITAL | Age: 51
Discharge: HOME | End: 2021-07-23
Attending: NURSE PRACTITIONER
Payer: MEDICAID

## 2021-07-23 DIAGNOSIS — N18.6 ESRD ON HEMODIALYSIS: ICD-10-CM

## 2021-07-23 DIAGNOSIS — Z99.2 ESRD ON HEMODIALYSIS: ICD-10-CM

## 2021-07-23 DIAGNOSIS — R97.20 ELEVATED PSA, BETWEEN 10 AND LESS THAN 20 NG/ML: ICD-10-CM

## 2021-07-23 PROCEDURE — 72195 MRI PELVIS WITHOUT CONTRAST: ICD-10-PCS | Mod: 26,TXP,, | Performed by: RADIOLOGY

## 2021-07-23 PROCEDURE — 72195 MRI PELVIS W/O DYE: CPT | Mod: 26,TXP,, | Performed by: RADIOLOGY

## 2021-07-23 PROCEDURE — 72195 MRI PELVIS W/O DYE: CPT | Mod: TC,TXP

## 2021-07-27 ENCOUNTER — TELEPHONE (OUTPATIENT)
Dept: UROLOGY | Facility: CLINIC | Age: 51
End: 2021-07-27

## 2021-07-30 ENCOUNTER — TELEPHONE (OUTPATIENT)
Dept: TRANSPLANT | Facility: CLINIC | Age: 51
End: 2021-07-30

## 2021-08-02 ENCOUNTER — PATIENT MESSAGE (OUTPATIENT)
Dept: UROLOGY | Facility: CLINIC | Age: 51
End: 2021-08-02

## 2021-08-02 DIAGNOSIS — R97.20 ELEVATED PSA: Primary | ICD-10-CM

## 2021-08-09 LAB — HPRA INTERPRETATION: NORMAL

## 2021-08-11 ENCOUNTER — PATIENT MESSAGE (OUTPATIENT)
Dept: INTERNAL MEDICINE | Facility: CLINIC | Age: 51
End: 2021-08-11

## 2021-08-11 DIAGNOSIS — N18.5 CKD STAGE 5 SECONDARY TO HYPERTENSION: ICD-10-CM

## 2021-08-11 DIAGNOSIS — N18.4 STAGE 4 CHRONIC KIDNEY DISEASE: ICD-10-CM

## 2021-08-11 DIAGNOSIS — I10 ESSENTIAL HYPERTENSION: ICD-10-CM

## 2021-08-11 DIAGNOSIS — I12.0 CKD STAGE 5 SECONDARY TO HYPERTENSION: ICD-10-CM

## 2021-08-11 RX ORDER — SEVELAMER CARBONATE 800 MG/1
800 TABLET, FILM COATED ORAL 4 TIMES DAILY
Qty: 120 TABLET | Refills: 3 | Status: SHIPPED | OUTPATIENT
Start: 2021-08-11

## 2021-08-11 RX ORDER — CALCITRIOL 0.5 UG/1
0.5 CAPSULE ORAL DAILY
Qty: 30 CAPSULE | Refills: 3 | Status: SHIPPED | OUTPATIENT
Start: 2021-08-11 | End: 2022-04-05 | Stop reason: SDUPTHER

## 2021-08-11 RX ORDER — CALCITRIOL 0.5 UG/1
0.5 CAPSULE ORAL DAILY
Qty: 30 CAPSULE | Refills: 3 | Status: SHIPPED | OUTPATIENT
Start: 2021-08-11 | End: 2021-08-11 | Stop reason: SDUPTHER

## 2021-08-11 RX ORDER — ERGOCALCIFEROL 1.25 MG/1
50000 CAPSULE ORAL
Qty: 12 CAPSULE | Refills: 3 | Status: SHIPPED | OUTPATIENT
Start: 2021-08-11 | End: 2022-04-05 | Stop reason: SDUPTHER

## 2021-08-11 RX ORDER — FUROSEMIDE 40 MG/1
40 TABLET ORAL DAILY
Qty: 90 TABLET | Refills: 3 | Status: SHIPPED | OUTPATIENT
Start: 2021-08-11 | End: 2023-05-16 | Stop reason: SDUPTHER

## 2021-08-11 RX ORDER — LABETALOL 200 MG/1
200 TABLET, FILM COATED ORAL EVERY 12 HOURS
Qty: 60 TABLET | Refills: 4 | Status: SHIPPED | OUTPATIENT
Start: 2021-08-11 | End: 2022-04-14 | Stop reason: SDUPTHER

## 2021-08-20 PROCEDURE — 86977 RBC SERUM PRETX INCUBJ/INHIB: CPT | Mod: PO,TXP | Performed by: NURSE PRACTITIONER

## 2021-08-20 PROCEDURE — 86833 HLA CLASS II HIGH DEFIN QUAL: CPT | Mod: PO,TXP | Performed by: NURSE PRACTITIONER

## 2021-08-20 PROCEDURE — 86832 HLA CLASS I HIGH DEFIN QUAL: CPT | Mod: PO,TXP | Performed by: NURSE PRACTITIONER

## 2021-08-24 ENCOUNTER — TELEPHONE (OUTPATIENT)
Dept: INTERNAL MEDICINE | Facility: CLINIC | Age: 51
End: 2021-08-24

## 2021-08-26 ENCOUNTER — OFFICE VISIT (OUTPATIENT)
Dept: VASCULAR SURGERY | Facility: CLINIC | Age: 51
End: 2021-08-26
Attending: SURGERY
Payer: COMMERCIAL

## 2021-08-26 DIAGNOSIS — N18.6 END STAGE RENAL FAILURE ON DIALYSIS: Primary | ICD-10-CM

## 2021-08-26 DIAGNOSIS — Z99.2 END STAGE RENAL FAILURE ON DIALYSIS: Primary | ICD-10-CM

## 2021-08-26 LAB
CLASS I ANTIBODY COMMENTS - LUMINEX: NORMAL
CLASS II ANTIBODIES - LUMINEX: NEGATIVE
CPRA %: 0
SERUM COLLECTION DT - LUMINEX CLASS I: NORMAL
SERUM COLLECTION DT - LUMINEX CLASS II: NORMAL
SPCL1 TESTING DATE: NORMAL
SPCL2 TESTING DATE: NORMAL
SPLUA TESTING DATE: NORMAL

## 2021-08-26 PROCEDURE — 99214 PR OFFICE/OUTPT VISIT, EST, LEVL IV, 30-39 MIN: ICD-10-PCS | Mod: HCNC,NTX,S$GLB, | Performed by: SURGERY

## 2021-08-26 PROCEDURE — 99214 OFFICE O/P EST MOD 30 MIN: CPT | Mod: HCNC,NTX,S$GLB, | Performed by: SURGERY

## 2021-09-27 PROCEDURE — 86832 HLA CLASS I HIGH DEFIN QUAL: CPT | Mod: HCNC,PO,TXP | Performed by: NURSE PRACTITIONER

## 2021-09-27 PROCEDURE — 86833 HLA CLASS II HIGH DEFIN QUAL: CPT | Mod: HCNC,PO,TXP | Performed by: NURSE PRACTITIONER

## 2021-10-04 ENCOUNTER — LAB VISIT (OUTPATIENT)
Dept: LAB | Facility: HOSPITAL | Age: 51
End: 2021-10-04
Payer: COMMERCIAL

## 2021-10-04 DIAGNOSIS — Z76.82 PRE-KIDNEY TRANSPLANT, LISTED: ICD-10-CM

## 2021-10-25 LAB — HPRA INTERPRETATION: NORMAL

## 2021-10-28 LAB
CLASS I ANTIBODIES - LUMINEX: NORMAL
CLASS II ANTIBODIES - LUMINEX: NEGATIVE
CPRA %: 0
SERUM COLLECTION DT - LUMINEX CLASS I: NORMAL
SERUM COLLECTION DT - LUMINEX CLASS II: NORMAL
SPCL1 TESTING DATE: NORMAL
SPCL2 TESTING DATE: NORMAL
SPCLU TESTING DATE: NORMAL

## 2022-01-17 PROCEDURE — 86833 HLA CLASS II HIGH DEFIN QUAL: CPT | Mod: HCNC,PO,TXP | Performed by: NURSE PRACTITIONER

## 2022-01-17 PROCEDURE — 86832 HLA CLASS I HIGH DEFIN QUAL: CPT | Mod: HCNC,PO,TXP | Performed by: NURSE PRACTITIONER

## 2022-01-21 ENCOUNTER — LAB VISIT (OUTPATIENT)
Dept: LAB | Facility: HOSPITAL | Age: 52
End: 2022-01-21
Payer: COMMERCIAL

## 2022-01-21 DIAGNOSIS — Z76.82 PRE-KIDNEY TRANSPLANT, LISTED: ICD-10-CM

## 2022-02-17 LAB — HPRA INTERPRETATION: NORMAL

## 2022-02-18 DIAGNOSIS — Z76.82 ORGAN TRANSPLANT CANDIDATE: Primary | ICD-10-CM

## 2022-02-18 DIAGNOSIS — Z76.82 PRE-KIDNEY TRANSPLANT, LISTED: Primary | ICD-10-CM

## 2022-02-22 ENCOUNTER — TELEPHONE (OUTPATIENT)
Dept: TRANSPLANT | Facility: CLINIC | Age: 52
End: 2022-02-22
Payer: COMMERCIAL

## 2022-02-24 ENCOUNTER — LAB VISIT (OUTPATIENT)
Dept: LAB | Facility: HOSPITAL | Age: 52
End: 2022-02-24
Payer: COMMERCIAL

## 2022-02-24 DIAGNOSIS — Z76.82 PRE-KIDNEY TRANSPLANT, LISTED: ICD-10-CM

## 2022-03-07 LAB
CLASS I ANTIBODY COMMENTS - LUMINEX: NORMAL
CLASS II ANTIBODY COMMENTS - LUMINEX: NORMAL
CPRA %: 0
SERUM COLLECTION DT - LUMINEX CLASS I: NORMAL
SERUM COLLECTION DT - LUMINEX CLASS II: NORMAL
SPCL1 TESTING DATE: NORMAL
SPCL2 TESTING DATE: NORMAL
SPCLU TESTING DATE: NORMAL

## 2022-03-29 PROCEDURE — 99001 SPECIMEN HANDLING PT-LAB: CPT | Mod: HCNC,PO,TXP | Performed by: NURSE PRACTITIONER

## 2022-04-05 DIAGNOSIS — I12.0 CKD STAGE 5 SECONDARY TO HYPERTENSION: ICD-10-CM

## 2022-04-05 DIAGNOSIS — N18.5 CKD STAGE 5 SECONDARY TO HYPERTENSION: ICD-10-CM

## 2022-04-05 NOTE — TELEPHONE ENCOUNTER
----- Message from Becky Rea sent at 4/5/2022  1:11 PM CDT -----  Regarding: Refills  Contact: [phone]  RX Refill Request    Who Called: Misael    Refill or New Rx: Refills    RX Name and Strength:  ergocalciferol (ERGOCALCIFEROL) 50,000 unit Cap  calcitRIOL (ROCALTROL) 0.5 MCG Cap    Preferred Pharmacy with phone number:  [facility] - [place], [place] - [address] [facility]   [phone]    Local or Mail Order: Local    Ordering Provider: Greg Jernigan MD    Would the patient rather a call back or a response via [facility]? Call Back    Best Call Back Number:[phone]

## 2022-04-06 RX ORDER — ERGOCALCIFEROL 1.25 MG/1
50000 CAPSULE ORAL
Qty: 12 CAPSULE | Refills: 3 | Status: SHIPPED | OUTPATIENT
Start: 2022-04-06 | End: 2022-04-14 | Stop reason: SDUPTHER

## 2022-04-06 RX ORDER — CALCITRIOL 0.5 UG/1
0.5 CAPSULE ORAL DAILY
Qty: 30 CAPSULE | Refills: 3 | Status: SHIPPED | OUTPATIENT
Start: 2022-04-06 | End: 2022-04-14 | Stop reason: SDUPTHER

## 2022-04-14 DIAGNOSIS — I12.0 CKD STAGE 5 SECONDARY TO HYPERTENSION: ICD-10-CM

## 2022-04-14 DIAGNOSIS — N18.5 CKD STAGE 5 SECONDARY TO HYPERTENSION: ICD-10-CM

## 2022-04-14 DIAGNOSIS — I10 ESSENTIAL HYPERTENSION: ICD-10-CM

## 2022-04-14 NOTE — TELEPHONE ENCOUNTER
----- Message from Felicity Valenzuela sent at 4/14/2022  1:35 PM CDT -----  Contact: [phone]  Requesting an RX refill or new RX.    Is this a refill or new RX: Refill    RX name and strength (copy/paste from chart):  calcitRIOL (ROCALTROL) 0.5 MCG Cap    Is this a 30 day or 90 day RX: 30 capsule    Pharmacy name and phone # (copy/paste from chart):  [address] - [address] [facility]    Phone:  [phone]  Fax:  [phone]    Callback number: [phone]; medication sent to wrong pharmacy; please send to the above pharmacy.        Requesting an RX refill or new RX.    Is this a refill or new RX: Refill    RX name and strength (copy/paste from chart):  Vitamin D    Pharmacy name and phone # (copy/paste from chart):  [address] [address]   Phone:  [phone]  Fax:  [phone]      Callback number: [phone]; medication sent to wrong pharmacy; please send to the above pharmacy.        Requesting an RX refill or new RX.    Is this a refill or new RX: Refill    RX name and strength (copy/paste from chart):  labetaloL (NORMODYNE) 200 MG tablet    Is this a 30 day or 90 day RX: 60 tablet    Pharmacy name and phone # (copy/paste from chart):  [address]   Phone:  [phone]  Fax:  [phone]      Callback number: [phone]; medication sent to wrong pharmacy; please send to the above pharmacy.

## 2022-04-20 RX ORDER — CALCITRIOL 0.5 UG/1
0.5 CAPSULE ORAL DAILY
Qty: 30 CAPSULE | Refills: 3 | Status: SHIPPED | OUTPATIENT
Start: 2022-04-20 | End: 2023-05-16 | Stop reason: SDUPTHER

## 2022-04-20 RX ORDER — ERGOCALCIFEROL 1.25 MG/1
50000 CAPSULE ORAL
Qty: 12 CAPSULE | Refills: 3 | Status: SHIPPED | OUTPATIENT
Start: 2022-04-20 | End: 2023-05-16 | Stop reason: SDUPTHER

## 2022-04-20 RX ORDER — LABETALOL 200 MG/1
200 TABLET, FILM COATED ORAL EVERY 12 HOURS
Qty: 60 TABLET | Refills: 4 | Status: SHIPPED | OUTPATIENT
Start: 2022-04-20 | End: 2023-05-16 | Stop reason: SDUPTHER

## 2022-04-25 PROCEDURE — 86833 HLA CLASS II HIGH DEFIN QUAL: CPT | Mod: PO,TXP | Performed by: NURSE PRACTITIONER

## 2022-04-25 PROCEDURE — 86832 HLA CLASS I HIGH DEFIN QUAL: CPT | Mod: PO,TXP | Performed by: NURSE PRACTITIONER

## 2022-04-27 ENCOUNTER — LAB VISIT (OUTPATIENT)
Dept: LAB | Facility: HOSPITAL | Age: 52
End: 2022-04-27
Payer: COMMERCIAL

## 2022-04-27 DIAGNOSIS — Z76.82 PRE-KIDNEY TRANSPLANT, LISTED: ICD-10-CM

## 2022-05-11 ENCOUNTER — TELEPHONE (OUTPATIENT)
Dept: CARDIOLOGY | Facility: HOSPITAL | Age: 52
End: 2022-05-11
Payer: COMMERCIAL

## 2022-05-16 ENCOUNTER — TELEPHONE (OUTPATIENT)
Dept: TRANSPLANT | Facility: CLINIC | Age: 52
End: 2022-05-16
Payer: COMMERCIAL

## 2022-05-23 PROCEDURE — 99001 SPECIMEN HANDLING PT-LAB: CPT | Mod: PO,TXP | Performed by: NURSE PRACTITIONER

## 2022-05-26 ENCOUNTER — LAB VISIT (OUTPATIENT)
Dept: LAB | Facility: HOSPITAL | Age: 52
End: 2022-05-26
Payer: COMMERCIAL

## 2022-05-26 DIAGNOSIS — Z76.82 PRE-KIDNEY TRANSPLANT, LISTED: ICD-10-CM

## 2022-05-27 LAB — HPRA INTERPRETATION: NORMAL

## 2022-06-06 ENCOUNTER — PES CALL (OUTPATIENT)
Dept: ADMINISTRATIVE | Facility: CLINIC | Age: 52
End: 2022-06-06
Payer: COMMERCIAL

## 2022-06-08 LAB
CLASS I ANTIBODY COMMENTS - LUMINEX: NORMAL
CLASS II ANTIBODIES - LUMINEX: NORMAL
CLASS II ANTIBODY COMMENTS - LUMINEX: NORMAL
CPRA %: 0
SERUM COLLECTION DT - LUMINEX CLASS I: NORMAL
SERUM COLLECTION DT - LUMINEX CLASS II: NORMAL
SPCL1 TESTING DATE: NORMAL
SPCL2 TESTING DATE: NORMAL
SPCLU TESTING DATE: NORMAL

## 2022-06-27 PROCEDURE — 86832 HLA CLASS I HIGH DEFIN QUAL: CPT | Mod: HCNC,PO,TXP | Performed by: NURSE PRACTITIONER

## 2022-06-27 PROCEDURE — 86833 HLA CLASS II HIGH DEFIN QUAL: CPT | Mod: HCNC,PO,TXP | Performed by: NURSE PRACTITIONER

## 2022-06-30 ENCOUNTER — LAB VISIT (OUTPATIENT)
Dept: LAB | Facility: HOSPITAL | Age: 52
End: 2022-06-30
Payer: COMMERCIAL

## 2022-06-30 DIAGNOSIS — Z76.82 PRE-KIDNEY TRANSPLANT, LISTED: ICD-10-CM

## 2022-07-11 LAB — HPRA INTERPRETATION: NORMAL

## 2022-07-11 NOTE — TELEPHONE ENCOUNTER
Spoke to wife and appts rescheduled   Stelara Counseling:  I discussed with the patient the risks of ustekinumab including but not limited to immunosuppression, malignancy, posterior leukoencephalopathy syndrome, and serious infections.  The patient understands that monitoring is required including a PPD at baseline and must alert us or the primary physician if symptoms of infection or other concerning signs are noted.

## 2022-07-21 ENCOUNTER — PES CALL (OUTPATIENT)
Dept: ADMINISTRATIVE | Facility: CLINIC | Age: 52
End: 2022-07-21
Payer: MEDICARE

## 2022-08-05 ENCOUNTER — PES CALL (OUTPATIENT)
Dept: ADMINISTRATIVE | Facility: CLINIC | Age: 52
End: 2022-08-05
Payer: MEDICARE

## 2022-08-11 DIAGNOSIS — Z76.82 PRE-KIDNEY TRANSPLANT, LISTED: Primary | ICD-10-CM

## 2022-08-16 ENCOUNTER — TELEPHONE (OUTPATIENT)
Dept: CARDIOLOGY | Facility: HOSPITAL | Age: 52
End: 2022-08-16
Payer: MEDICARE

## 2022-08-18 ENCOUNTER — OFFICE VISIT (OUTPATIENT)
Dept: TRANSPLANT | Facility: CLINIC | Age: 52
End: 2022-08-18
Payer: COMMERCIAL

## 2022-08-18 ENCOUNTER — HOSPITAL ENCOUNTER (OUTPATIENT)
Dept: CARDIOLOGY | Facility: HOSPITAL | Age: 52
Discharge: HOME | End: 2022-08-18
Attending: NURSE PRACTITIONER
Payer: COMMERCIAL

## 2022-08-18 ENCOUNTER — HOSPITAL ENCOUNTER (OUTPATIENT)
Dept: RADIOLOGY | Facility: HOSPITAL | Age: 52
Discharge: HOME | End: 2022-08-18
Attending: NURSE PRACTITIONER
Payer: COMMERCIAL

## 2022-08-18 ENCOUNTER — HOSPITAL ENCOUNTER (OUTPATIENT)
Dept: RADIOLOGY | Facility: HOSPITAL | Age: 52
Discharge: HOME | End: 2022-08-18
Attending: NURSE PRACTITIONER
Payer: MEDICARE

## 2022-08-18 ENCOUNTER — HOSPITAL ENCOUNTER (OUTPATIENT)
Dept: CARDIOLOGY | Facility: HOSPITAL | Age: 52
Discharge: HOME | End: 2022-08-18
Attending: NURSE PRACTITIONER
Payer: MEDICARE

## 2022-08-18 VITALS
DIASTOLIC BLOOD PRESSURE: 98 MMHG | BODY MASS INDEX: 28.52 KG/M2 | WEIGHT: 203.69 LBS | HEIGHT: 71 IN | HEART RATE: 78 BPM | OXYGEN SATURATION: 95 % | SYSTOLIC BLOOD PRESSURE: 167 MMHG | TEMPERATURE: 97 F | RESPIRATION RATE: 16 BRPM

## 2022-08-18 VITALS
HEIGHT: 71 IN | HEART RATE: 75 BPM | DIASTOLIC BLOOD PRESSURE: 88 MMHG | SYSTOLIC BLOOD PRESSURE: 138 MMHG | WEIGHT: 202 LBS | BODY MASS INDEX: 28.28 KG/M2

## 2022-08-18 VITALS
SYSTOLIC BLOOD PRESSURE: 139 MMHG | BODY MASS INDEX: 28.28 KG/M2 | HEART RATE: 101 BPM | DIASTOLIC BLOOD PRESSURE: 76 MMHG | HEIGHT: 71 IN | WEIGHT: 202 LBS | RESPIRATION RATE: 18 BRPM

## 2022-08-18 DIAGNOSIS — Z76.82 ORGAN TRANSPLANT CANDIDATE: ICD-10-CM

## 2022-08-18 DIAGNOSIS — N25.81 SECONDARY HYPERPARATHYROIDISM: ICD-10-CM

## 2022-08-18 DIAGNOSIS — Z99.2 ESRD ON DIALYSIS: Chronic | ICD-10-CM

## 2022-08-18 DIAGNOSIS — N18.6 ESRD ON DIALYSIS: Chronic | ICD-10-CM

## 2022-08-18 DIAGNOSIS — Z76.82 PATIENT ON WAITING LIST FOR KIDNEY TRANSPLANT: Primary | ICD-10-CM

## 2022-08-18 DIAGNOSIS — I10 ESSENTIAL HYPERTENSION: ICD-10-CM

## 2022-08-18 DIAGNOSIS — I12.0: ICD-10-CM

## 2022-08-18 LAB
ASCENDING AORTA: 3.17 CM
AV INDEX (PROSTH): 0.89
AV MEAN GRADIENT: 5 MMHG
AV PEAK GRADIENT: 9 MMHG
AV VALVE AREA: 4.24 CM2
AV VELOCITY RATIO: 0.77
BSA FOR ECHO PROCEDURE: 2.14 M2
CV ECHO LV RWT: 0.43 CM
CV STRESS BASE HR: 95 BPM
DIASTOLIC BLOOD PRESSURE: 93 MMHG
DOP CALC AO PEAK VEL: 1.46 M/S
DOP CALC AO VTI: 29.3 CM
DOP CALC LVOT AREA: 4.8 CM2
DOP CALC LVOT DIAMETER: 2.47 CM
DOP CALC LVOT PEAK VEL: 1.12 M/S
DOP CALC LVOT STROKE VOLUME: 124.28 CM3
DOP CALCLVOT PEAK VEL VTI: 25.95 CM
E WAVE DECELERATION TIME: 235.3 MSEC
E/A RATIO: 1.18
E/E' RATIO: 7.1 M/S
ECHO LV POSTERIOR WALL: 0.98 CM (ref 0.6–1.1)
EJECTION FRACTION- HIGH: 65 %
EJECTION FRACTION: 60 %
END DIASTOLIC INDEX-HIGH: 153 ML/M2
END DIASTOLIC INDEX-LOW: 93 ML/M2
END SYSTOLIC INDEX-HIGH: 71 ML/M2
END SYSTOLIC INDEX-LOW: 31 ML/M2
FRACTIONAL SHORTENING: 42 % (ref 28–44)
INTERVENTRICULAR SEPTUM: 1 CM (ref 0.6–1.1)
LA MAJOR: 5.62 CM
LA MINOR: 5.42 CM
LA WIDTH: 4.14 CM
LEFT ATRIUM SIZE: 4.34 CM
LEFT ATRIUM VOLUME INDEX MOD: 35.5 ML/M2
LEFT ATRIUM VOLUME INDEX: 39.8 ML/M2
LEFT ATRIUM VOLUME MOD: 75.29 CM3
LEFT ATRIUM VOLUME: 84.28 CM3
LEFT INTERNAL DIMENSION IN SYSTOLE: 2.64 CM (ref 2.1–4)
LEFT VENTRICLE DIASTOLIC VOLUME INDEX: 44.9 ML/M2
LEFT VENTRICLE DIASTOLIC VOLUME: 95.19 ML
LEFT VENTRICLE MASS INDEX: 73 G/M2
LEFT VENTRICLE SYSTOLIC VOLUME INDEX: 12 ML/M2
LEFT VENTRICLE SYSTOLIC VOLUME: 25.44 ML
LEFT VENTRICULAR INTERNAL DIMENSION IN DIASTOLE: 4.56 CM (ref 3.5–6)
LEFT VENTRICULAR MASS: 154.44 G
LV LATERAL E/E' RATIO: 7.1 M/S
LV SEPTAL E/E' RATIO: 7.1 M/S
MV PEAK A VEL: 0.6 M/S
MV PEAK E VEL: 0.71 M/S
MV STENOSIS PRESSURE HALF TIME: 68.24 MS
MV VALVE AREA P 1/2 METHOD: 3.22 CM2
NUC REST DIASTOLIC VOLUME INDEX: 137
NUC REST EJECTION FRACTION: 66
NUC REST SYSTOLIC VOLUME INDEX: 47
NUC STRESS DIASTOLIC VOLUME INDEX: 139
NUC STRESS EJECTION FRACTION: 60 %
NUC STRESS SYSTOLIC VOLUME INDEX: 56
OHS CV CPX 1 MINUTE RECOVERY HEART RATE: 112 BPM
OHS CV CPX 85 PERCENT MAX PREDICTED HEART RATE MALE: 143
OHS CV CPX MAX PREDICTED HEART RATE: 168
OHS CV CPX PATIENT IS FEMALE: 0
OHS CV CPX PATIENT IS MALE: 1
OHS CV CPX PEAK HEAR RATE: 97 BPM
OHS CV CPX PERCENT MAX PREDICTED HEART RATE ACHIEVED: 58
OHS CV CPX RATE PRESSURE PRODUCT PRESENTING: NORMAL
PISA TR MAX VEL: 1.91 M/S
RA MAJOR: 4.83 CM
RA PRESSURE: 3 MMHG
RA WIDTH: 3.74 CM
RETIRED EF AND QEF - SEE NOTES: 53 %
RIGHT VENTRICULAR END-DIASTOLIC DIMENSION: 3.71 CM
RV TISSUE DOPPLER FREE WALL SYSTOLIC VELOCITY 1 (APICAL 4 CHAMBER VIEW): 16.62 CM/S
SINUS: 3.27 CM
STJ: 2.73 CM
SYSTOLIC BLOOD PRESSURE: 149 MMHG
TDI LATERAL: 0.1 M/S
TDI SEPTAL: 0.1 M/S
TDI: 0.1 M/S
TR MAX PG: 15 MMHG
TRICUSPID ANNULAR PLANE SYSTOLIC EXCURSION: 2.37 CM
TV REST PULMONARY ARTERY PRESSURE: 18 MMHG

## 2022-08-18 PROCEDURE — 93306 TTE W/DOPPLER COMPLETE: CPT | Mod: HCNC,TXP

## 2022-08-18 PROCEDURE — 71046 X-RAY EXAM CHEST 2 VIEWS: CPT | Mod: TC,TXP

## 2022-08-18 PROCEDURE — 3008F BODY MASS INDEX DOCD: CPT | Mod: HCNC,CPTII,S$GLB,TXP | Performed by: NURSE PRACTITIONER

## 2022-08-18 PROCEDURE — 99499 RISK ADDL DX/OHS AUDIT: ICD-10-PCS | Mod: HCNC,S$GLB,TXP, | Performed by: NURSE PRACTITIONER

## 2022-08-18 PROCEDURE — 78452 STRESS TEST WITH MYOCARDIAL PERFUSION (CUPID ONLY): ICD-10-PCS | Mod: 26,HCNC,TXP, | Performed by: INTERNAL MEDICINE

## 2022-08-18 PROCEDURE — 99215 PR OFFICE/OUTPT VISIT, EST, LEVL V, 40-54 MIN: ICD-10-PCS | Mod: HCNC,S$GLB,TXP, | Performed by: NURSE PRACTITIONER

## 2022-08-18 PROCEDURE — 93306 ECHO (CUPID ONLY): ICD-10-PCS | Mod: 26,HCNC,TXP, | Performed by: INTERNAL MEDICINE

## 2022-08-18 PROCEDURE — 3077F PR MOST RECENT SYSTOLIC BLOOD PRESSURE >= 140 MM HG: ICD-10-PCS | Mod: HCNC,CPTII,S$GLB,TXP | Performed by: NURSE PRACTITIONER

## 2022-08-18 PROCEDURE — 93016 STRESS TEST WITH MYOCARDIAL PERFUSION (CUPID ONLY): ICD-10-PCS | Mod: HCNC,TXP,, | Performed by: INTERNAL MEDICINE

## 2022-08-18 PROCEDURE — 99499 UNLISTED E&M SERVICE: CPT | Mod: HCNC,S$GLB,TXP, | Performed by: NURSE PRACTITIONER

## 2022-08-18 PROCEDURE — 71046 X-RAY EXAM CHEST 2 VIEWS: CPT | Mod: 26,HCNC,TXP, | Performed by: RADIOLOGY

## 2022-08-18 PROCEDURE — 71046 XR CHEST PA AND LATERAL: ICD-10-PCS | Mod: 26,HCNC,TXP, | Performed by: RADIOLOGY

## 2022-08-18 PROCEDURE — 3077F SYST BP >= 140 MM HG: CPT | Mod: HCNC,CPTII,S$GLB,TXP | Performed by: NURSE PRACTITIONER

## 2022-08-18 PROCEDURE — 93018 STRESS TEST WITH MYOCARDIAL PERFUSION (CUPID ONLY): ICD-10-PCS | Mod: HCNC,TXP,, | Performed by: INTERNAL MEDICINE

## 2022-08-18 PROCEDURE — 72170 X-RAY EXAM OF PELVIS: CPT | Mod: TC,TXP

## 2022-08-18 PROCEDURE — 76770 US EXAM ABDO BACK WALL COMP: CPT | Mod: 26,HCNC,TXP, | Performed by: RADIOLOGY

## 2022-08-18 PROCEDURE — 93306 TTE W/DOPPLER COMPLETE: CPT | Mod: 26,HCNC,TXP, | Performed by: INTERNAL MEDICINE

## 2022-08-18 PROCEDURE — 93016 CV STRESS TEST SUPVJ ONLY: CPT | Mod: HCNC,TXP,, | Performed by: INTERNAL MEDICINE

## 2022-08-18 PROCEDURE — 63600175 PHARM REV CODE 636 W HCPCS: Mod: HCNC,TXP | Performed by: NURSE PRACTITIONER

## 2022-08-18 PROCEDURE — 72170 X-RAY EXAM OF PELVIS: CPT | Mod: 26,HCNC,TXP, | Performed by: RADIOLOGY

## 2022-08-18 PROCEDURE — 93018 CV STRESS TEST I&R ONLY: CPT | Mod: HCNC,TXP,, | Performed by: INTERNAL MEDICINE

## 2022-08-18 PROCEDURE — 3008F PR BODY MASS INDEX (BMI) DOCUMENTED: ICD-10-PCS | Mod: HCNC,CPTII,S$GLB,TXP | Performed by: NURSE PRACTITIONER

## 2022-08-18 PROCEDURE — A9502 TC99M TETROFOSMIN: HCPCS | Mod: HCNC,TXP

## 2022-08-18 PROCEDURE — 1159F MED LIST DOCD IN RCRD: CPT | Mod: HCNC,CPTII,S$GLB,TXP | Performed by: NURSE PRACTITIONER

## 2022-08-18 PROCEDURE — 3080F DIAST BP >= 90 MM HG: CPT | Mod: HCNC,CPTII,S$GLB,TXP | Performed by: NURSE PRACTITIONER

## 2022-08-18 PROCEDURE — 99999 PR PBB SHADOW E&M-EST. PATIENT-LVL IV: CPT | Mod: PBBFAC,HCNC,TXP, | Performed by: NURSE PRACTITIONER

## 2022-08-18 PROCEDURE — 76770 US RETROPERITONEAL COMPLETE: ICD-10-PCS | Mod: 26,HCNC,TXP, | Performed by: RADIOLOGY

## 2022-08-18 PROCEDURE — 78452 HT MUSCLE IMAGE SPECT MULT: CPT | Mod: 26,HCNC,TXP, | Performed by: INTERNAL MEDICINE

## 2022-08-18 PROCEDURE — 1159F PR MEDICATION LIST DOCUMENTED IN MEDICAL RECORD: ICD-10-PCS | Mod: HCNC,CPTII,S$GLB,TXP | Performed by: NURSE PRACTITIONER

## 2022-08-18 PROCEDURE — 99999 PR PBB SHADOW E&M-EST. PATIENT-LVL IV: ICD-10-PCS | Mod: PBBFAC,HCNC,TXP, | Performed by: NURSE PRACTITIONER

## 2022-08-18 PROCEDURE — 72170 XR PELVIS ROUTINE AP: ICD-10-PCS | Mod: 26,HCNC,TXP, | Performed by: RADIOLOGY

## 2022-08-18 PROCEDURE — 3080F PR MOST RECENT DIASTOLIC BLOOD PRESSURE >= 90 MM HG: ICD-10-PCS | Mod: HCNC,CPTII,S$GLB,TXP | Performed by: NURSE PRACTITIONER

## 2022-08-18 PROCEDURE — 99215 OFFICE O/P EST HI 40 MIN: CPT | Mod: HCNC,S$GLB,TXP, | Performed by: NURSE PRACTITIONER

## 2022-08-18 PROCEDURE — 76770 US EXAM ABDO BACK WALL COMP: CPT | Mod: TC,TXP

## 2022-08-18 RX ORDER — REGADENOSON 0.08 MG/ML
0.4 INJECTION, SOLUTION INTRAVENOUS ONCE
Status: COMPLETED | OUTPATIENT
Start: 2022-08-18 | End: 2022-08-18

## 2022-08-18 RX ADMIN — REGADENOSON 0.4 MG: 0.08 INJECTION, SOLUTION INTRAVENOUS at 09:08

## 2022-08-18 NOTE — LETTER
August 22, 2022        Matthew Moreno  [address]  [facility] [address]  Phone: [phone]  Fax: [phone]             [facility] 1st Fl  [address]  [facility] [address]  Phone: [phone]   Patient: Misael Owens   MR Number: [id number]   YOB: 1970   Date of Visit: 8/18/2022       Dear Dr. Matthew Moreno    Thank you for referring Misael Owens to me for evaluation. Attached you will find relevant portions of my assessment and plan of care.    If you have questions, please do not hesitate to call me. I look forward to following Misael Owens along with you.    Sincerely,    Armida Inman NP    Enclosure    If you would like to receive this communication electronically, please contact [email or web address] or [phone] to request [facility] [employer] access.    [facility] [employer] is a tool which provides read-only access to select patient information with whom you have a relationship. Its easy to use and provides real time access to review your patients record including encounter summaries, notes, results, and demographic information.    If you feel you have received this communication in error or would no longer like to receive these types of communications, please e-mail [email or web address]

## 2022-08-18 NOTE — PROGRESS NOTES
Kidney Transplant Recipient Reevalulation    Referring Physician: Matthew Moreno  Current Nephrologist: Matthew Moreno  Waitlist Status: active  Dialysis Start Date: 4/1/2020    Subjective:     CC:  Annual reassessment of kidney transplant candidacy.    HPI:  Mr. Owens is a 52 y.o. year old Black or  male with ESRD secondary to HTN.  He has been on the wait list for a kidney transplant at [facility] since 4/1/2020. Patient is currently on hemodialysis started on 3/2020. Patient is dialyzing on TTS schedule.  Patient reports that he is tolerating dialysis well.. He has a LUE AV fistula. Patient denies any recent hospitalizations or ED visits.    Hospitalizations/ED visits:  none    Interval History:   Reports doing well. Dialysis is going well. He is staying active with walking his dog. Denies CP, claudication or SOB with activity.      CXR: 8/18/22 unremarkable   PXR:8/18/22 pending surgeon review  Renal US: 8/18/22: Small right renal cyst  Echo:  Results for orders placed during the hospital encounter of 08/18/22    Echo    Interpretation Summary  · The left ventricle is normal in size with concentric remodeling and normal systolic function.The estimated ejection fraction is 60%.  · Normal right ventricular size with normal right ventricular systolic function.  · Normal left ventricular diastolic function.  · Mild left atrial enlargement.  · The estimated PA systolic pressure is 18 mmHg.  · Normal central venous pressure (3 mmHg).    Stress:   Results for orders placed in visit on 05/22/20    Lexiscan Card Interp Cupid Stress test with myocardial perfusion    Interpretation Summary    The perfusion scan is free of evidence from myocardial ischemia or injury.    There is mild  apical thinning which is a normal variant.    Visually estimated ejection fraction is normal at rest and normal at stress.    There is normal wall motion at rest and post stress.    LV cavity size is normal at rest and  normal at stress.    The EKG portion of this study is negative for ischemia.    The patient reported no chest pain during the stress test.    There were no arrhythmias during stress.    Significant patient motion during stress test.    Colonoscopy: 10/2020 normal repeat in 10 years  PTH:  Lab Results   Component Value Date    .0 (H) 05/14/2021    CALCIUM 9.1 01/15/2021    PHOS 4.7 (H) 05/06/2020   PSA:  PSA, Screen (ng/mL)   Date Value   08/18/2022 1.6   7/23/21--- PSA 2        Current Outpatient Medications:     acetaminophen (TYLENOL) 325 MG tablet, Take 325 mg by mouth every 6 (six) hours as needed for Pain. Takes 1-2 tablets prn, Disp: , Rfl:     calcitRIOL (ROCALTROL) 0.5 MCG Cap, Take 1 capsule (0.5 mcg total) by mouth once daily., Disp: 30 capsule, Rfl: 3    ergocalciferol (ERGOCALCIFEROL) 50,000 unit Cap, Take 1 capsule (50,000 Units total) by mouth every 7 days., Disp: 12 capsule, Rfl: 3    furosemide (LASIX) 40 MG tablet, Take 1 tablet (40 mg total) by mouth once daily., Disp: 90 tablet, Rfl: 3    labetaloL (NORMODYNE) 200 MG tablet, Take 1 tablet (200 mg total) by mouth every 12 (twelve) hours., Disp: 60 tablet, Rfl: 4    RENVELA 800 mg Tab, Take 1 tablet (800 mg total) by mouth 4 (four) times daily., Disp: 120 tablet, Rfl: 3  No current facility-administered medications for this visit.    Facility-Administered Medications Ordered in Other Visits:     ceFAZolin injection 2 g, 2 g, Intravenous, On Call Procedure, Cherry Ocampo MD    lidocaine (PF) 10 mg/ml (1%) injection 10 mg, 1 mL, Intradermal, Once, Jay Sherman MD    mupirocin 2 % ointment, , Nasal, On Call Procedure, Cherry Ocampo MD, Given at 03/02/20 0854    ondansetron injection 4 mg, 4 mg, Intravenous, Q12H PRN, Jay Sherman MD    sodium chloride 0.9% flush 10 mL, 10 mL, Intravenous, PRN, Cherry Ocampo MD      Past Medical History:   Diagnosis Date    CKD (chronic kidney disease), stage IV     due to  "hypertension    ESRD on dialysis     Hypertension     Secondary hyperparathyroidism     Vitamin D deficiency          Review of Systems   Constitutional: Negative for appetite change, chills, fatigue and fever.   HENT: Negative for trouble swallowing.    Respiratory: Negative for cough, chest tightness, shortness of breath and wheezing.    Cardiovascular: Negative for chest pain, palpitations and leg swelling.   Gastrointestinal: Negative for abdominal pain, constipation, diarrhea and nausea.   Genitourinary: Positive for decreased urine volume.   Musculoskeletal: Negative for arthralgias and myalgias.   Skin: Negative for rash.   Neurological: Negative for dizziness, weakness, light-headedness and headaches.   Psychiatric/Behavioral: Negative for sleep disturbance.       Objective:   body mass index is 28.41 kg/m².  BP (!) 167/98 (BP Location: Right arm, Patient Position: Sitting, BP Method: Large (Automatic))   Pulse 78   Temp 97.3 °F (36.3 °C) (Temporal)   Resp 16   Ht 5' 11" (1.803 m)   Wt 92.4 kg (203 lb 11.3 oz)   SpO2 95%   BMI 28.41 kg/m²     Physical Exam  Constitutional:       General: He is not in acute distress.     Appearance: He is well-developed. He is not diaphoretic.   Cardiovascular:      Rate and Rhythm: Normal rate and regular rhythm.      Heart sounds: Normal heart sounds. No murmur heard.    No friction rub. No gallop.   Pulmonary:      Effort: Pulmonary effort is normal. No respiratory distress.      Breath sounds: Normal breath sounds. No wheezing or rales.   Abdominal:      General: Bowel sounds are normal. There is no distension.      Palpations: Abdomen is soft.      Tenderness: There is no abdominal tenderness.   Musculoskeletal:         General: No tenderness. Normal range of motion.   Skin:     General: Skin is warm and dry.      Findings: No rash.      Nails: There is no clubbing.   Neurological:      Mental Status: He is alert and oriented to person, place, and time. "   Psychiatric:         Behavior: Behavior normal.         Labs:  Lab Results   Component Value Date    WBC 6.43 01/15/2021    HGB 12.0 (L) 01/15/2021    HCT 38.2 (L) 01/15/2021     01/15/2021    K 3.8 01/15/2021    CL 95 01/15/2021    CO2 31 (H) 01/15/2021    BUN 55 (H) 01/15/2021    CREATININE 8.5 (H) 01/15/2021    EGFRNONAA 6.5 (A) 01/15/2021    CALCIUM 9.1 01/15/2021    PHOS 4.7 (H) 05/06/2020    MG 1.6 03/30/2020    ALBUMIN 4.4 01/15/2021    AST 26 01/15/2021    ALT 26 01/15/2021    UTPCR 0.61 (H) 05/06/2020    .0 (H) 05/14/2021       Lab Results   Component Value Date    BILIRUBINUA Negative 05/06/2020    PROTEINUA 2+ (A) 05/06/2020    NITRITE n 06/25/2021    RBCUA 0 05/06/2020    WBCUA 1 05/06/2020       No results found for: HLAABCTYPE    Lab Results   Component Value Date    CPRA 0 06/27/2022    CPRA 0 06/27/2022    CPRA 0 06/27/2022    LA4MMWZ Negative 11/29/2021    CIABCLM WEAK---B67 06/27/2022    CIIAB DQA1*05:05 04/25/2022    ABCMT WEAK DQA1*05:05 06/27/2022       Labs were reviewed with the patient.    Pre-transplant Workup:   Reviewed with the patient.    Assessment:     1. Patient on waiting list for kidney transplant    2. ESRD on dialysis    3. Essential hypertension    4. Hypertensive renal disease, malignant, stage 5 chronic kidney disease or end stage renal disease    5. Secondary hyperparathyroidism        Plan:       Transplant Candidacy:   Mr. Owens is a suitable kidney transplant candidate.  Meets center eligibility for accepting HCV+ donor offer - yes.  Patient educated on HCV+ donors. Misael is willing  to accept HCV+ donor offer -  yes   Patient is a candidate for KDPI > 85 kidney donor offer - no.due to weight  He  will remain active status at present.     Armida Inman NP       Follow-up:   In addition to the tests noted in the plan, Mr. Owens will continue to have reevaluation as per the standing pre-kidney transplant protocol:  1. Monthly blood for PRA  2. Annual return  to clinic, except HIV positive, > 65 years of age, or pancreas transplant candidates who will be scheduled to see transplant every 6 months while in pre-transplant phase  3. Annual re-testing: CXR, EKG, yearly mammograms for women over 40 and PSA for males over 40, cardiology follow-up as recommended by initial cardiology pre-transplant evaluation  4. Renal ultrasound every 2 years  5. Baseline colonoscopy after age 50 and repeated as recommended    UNOS Patient Status  Functional Status: 60% - Requires occasional assistance but is able to care for needs  Physical Capacity: No Limitations

## 2022-08-18 NOTE — PROGRESS NOTES
INITIAL PATIENT EDUCATION NOTE    Mr. Misael Owens was seen in pre-kidney transplant clinic for evaluation for kidney, kidney/pancreas or pancreas only transplant.  The patient attended a an individual video education session that discussed/reviewed the following aspects of transplantation: evaluation and selection committee process, UNOS waitlist management/multiple listings, types of organs offered (KDPI < 85%, KDPI > 85%, PHS risk, DCD, HCV+, HIV+ for HIV+ recipients and enbloc/dual), financial aspects, surgical procedures, dietary instruction pre- and post-transplant, health maintenance pre- and post-transplant, post-transplant hospitalization and outpatient follow-up, potential to participate in a research protocol, and medication management and side effects.  A question and answer session was provided after the presentation.    The patient was seen by all members of the multi-disciplinary team to include: Nephrologist/PA, Surgeon, , Transplant Coordinator, , Pharmacist and Dietician (if applicable).    The patient reviewed and signed all consents for evaluation which were witnessed and sent to scanning into the EPIC chart.    The patient was given an education book and plan for further evaluation based on his individual assessment.      Reviewed program requirement for complete COVID vaccination with documentation prior to listing.  COVID education information reviewed with patient.     The patient was informed that the transplant team would manage immediate post op pain. If the patient requires long term pain management, they will need to have that pain management addressed by their PCP or previous provider who wrote for long term pain medicines.    The patient was encouraged to call with any questions or concerns.

## 2022-08-19 DIAGNOSIS — Z76.82 PRE-KIDNEY TRANSPLANT, LISTED: Primary | ICD-10-CM

## 2022-08-19 NOTE — PROGRESS NOTES
YEARLY LIST MANAGEMENT NOTE    Misael Owens's kidney transplant listing status reviewed.  Patient is due for follow-up appointments on 8/18/23.  Appointments will be scheduled per protocol.

## 2022-08-29 PROCEDURE — 99001 SPECIMEN HANDLING PT-LAB: CPT | Mod: HCNC,PO,TXP | Performed by: NURSE PRACTITIONER

## 2022-09-06 ENCOUNTER — LAB VISIT (OUTPATIENT)
Dept: LAB | Facility: HOSPITAL | Age: 52
End: 2022-09-06
Payer: MEDICARE

## 2022-09-06 DIAGNOSIS — Z76.82 PRE-KIDNEY TRANSPLANT, LISTED: ICD-10-CM

## 2022-09-26 PROCEDURE — 86833 HLA CLASS II HIGH DEFIN QUAL: CPT | Mod: HCNC,PO,TXP | Performed by: NURSE PRACTITIONER

## 2022-09-26 PROCEDURE — 86832 HLA CLASS I HIGH DEFIN QUAL: CPT | Mod: HCNC,PO,TXP | Performed by: NURSE PRACTITIONER

## 2022-09-28 ENCOUNTER — LAB VISIT (OUTPATIENT)
Dept: LAB | Facility: HOSPITAL | Age: 52
End: 2022-09-28
Payer: MEDICARE

## 2022-09-28 DIAGNOSIS — Z76.82 PRE-KIDNEY TRANSPLANT, LISTED: ICD-10-CM

## 2022-09-28 NOTE — PROGRESS NOTES
"Transplant Recipient Adult Psychosocial Assessment Update (5/2021)  Misael Owens  [address]  [place] [address]     Physical Address:  [address] [place] [address]    Telephone Information:   Mobile [phone]   Home  [phone] (home)  Work  There is no work phone number on file.  E-mail  [email or web address]    Sex: male  YOB: 1970  Age: 52 y.o.    Encounter Date: 8/18/2022  [place] Citizen: yes  Primary Language: English   Needed: no    Emergency Contact:  Name: Lamar Owens  Relationship: mother  Address:[place]  Phone Numbers:  [phone] (home)    Family/Social Support:   Number of dependents/: pt reports 2 minor dependents: Tea 7 and Faustina 8, who are cared for by their mother.  Marital history: pt reports no marital history or current relationship   Other family dynamics: Pt presents alone. Pt resides alone. Pt reports Kim is very supportive. Pt also reports having 3 adult children, who are involved in pt's life. Pt reports mother as alive and well. Pt reports having 1 sibling, who he has an "on and off" relationship.     Household Composition:    Pt resides alone.     Do you and your caregivers have access to reliable transportation? yes  PRIMARY CAREGIVER:Lamar Owens  will be primary caregiver phone number [phone] (home). Pt will need caregiver plan confirmed prior to transplant.      provided in-depth information to patient and caregiver regarding pre- and post-transplant caregiver role.   strongly encourages patient and caregiver to have concrete plan regarding post-transplant care giving, including back-up caregiver(s) to ensure care giving needs are met as needed.    Patient states understanding all aspects of caregiver role/commitment.    Patient verbalizes understanding of the education provided today and caregiver responsibilities.         remains available. Patient agree to contact  in " a timely manner if concerns arise.      Able to take time off work without financial concerns: yes.     Additional Significant Others who will Assist with Transplant:  Name: Gertrude Owens   Relationship: sister  [facility] [place]  Phone Numbers:  [phone]   (mobile)      Living Will: no  Healthcare Power of : no pt reports trusting mother and adult children with medical decisions as needed.   Advance Directives on file: <<no information> per medical record.  Verbally reviewed LW/HCPA information.   provided patient with copy of LW/HCPA documents and provided education on completion of forms.    Living Donors: No. Education and resource information given to patient.    Highest Education Level: High School (9-12) or GED  Reading Ability: 12th grade  Reports difficulty with: seeing small print. Pt utilizes reading glasses as needed.   Learns Best By:  A combination of verbal, written, and hands-on instruction.      Status: no  VA Benefits: no     Working for Income: No  If no, reason not working: Demands of Treatment  Patient is disabled.  Prior to disability, patient  was employed as a plant contractor and plans to return to work once health improves..     Spouse/Significant Other Employment: Pt reports no s/o     Disabled: no pt reports he has applied for disability and status is pending.     Monthly Income:  SS Disability: $1227      Able to afford all costs now and if transplanted, including medications: yes    Patient verbalizes understanding of personal responsibilities related to transplant costs and the importance of having a financial plan to ensure that patients transplant costs are fully covered.       provided fundraising information/education. Patient verbalizes understanding.   remains available.    Insurance:   Payer/Plan Subscr  Sex Relation Sub. Ins. ID Effective Group Num   1. [employer]* DONTEHARRISON 1970 Male Self [id number]  4/1/21 [id number]                                   [address] [address]   2. [place] HARRISON GONZALEZ 1970 Male Self [id number] 4/1/21                                    [address] [address]           Primary Insurance (for UNOS reporting): Public Insurance - Medicare & Choice  Secondary Insurance (for UNOS reporting): Public Insurance - Medicaid  Patient verbalizes clear understanding that patient may experience difficulty obtaining and/or be denied insurance coverage post-surgery. This includes and is not limited to disability insurance, life insurance, health insurance, burial insurance, long term care insurance, and other insurances.      Patient also reports understanding that future health concerns related to or unrelated to transplantation may not be covered by patient's insurance.  Resources and information provided and reviewed.     Patient provides verbal permission to release any necessary information to outside resources for patient care and discharge planning.  Resources and information provided are reviewed.      Dialysis Adherence: Patient reports high adherence with dialysis treatments since starting on March 31, 2020.     Infusion Service: patient utilizing? no  Home Health: patient utilizing? no  DME: yes bpc and scale   Pulmonary/Cardiac Rehab: pt denies    ADLS:  Pt reports pt is independent with all ADLS including , bathing,walking,taking medications, cooking, housekeeping, eating, and shopping. Pt reports he does not drive often due to joint pain. Pt utilizes Lyft transport.   Adherence: Adherence education and counseling provided.     Per History Section:  Past Medical History:   Diagnosis Date    CKD (chronic kidney disease), stage IV     due to hypertension    ESRD on dialysis     Hypertension     Secondary hyperparathyroidism     Vitamin D deficiency      Social History     Tobacco Use    Smoking status: Never    Smokeless tobacco: Never   Substance Use Topics    Alcohol use: No     Social History      Substance and Sexual Activity   Drug Use No     Social History     Substance and Sexual Activity   Sexual Activity Not on file       Per Today's Psychosocial:  Tobacco: none, patient denies any use.  Alcohol: none, patient denies any use.  Illicit Drugs/Non-prescribed Medications: none, patient denies any use.    Patient and Caregiver states clear understanding of the potential impact of substance use as it relates to transplant candidacy and is aware of possible random substance screening.  Substance abstinence/cessation counseling, education and resources provided and reviewed.     Arrests/DWI/Treatment/Rehab: patient denies    Psychiatric History:    Mental Health:  pt denies mental health concerns    Psychiatrist/Counselor: Pt denies currently or in the past and reports willingness to meet with psychiatry if required by transplant.   Medications:  Pt denies utilizing mental health medications currently or in the past  Suicide/Homicide Issues: Pt denies current or past suicidal/homicidal ideation.  Safety at home: Pt denies any home safety concerns.      Knowledge: Patient states having clear understanding and realistic expectations regarding the potential risks and potential benefits of organ transplantation and organ donation and agrees to discuss with health care team members and support system members, as well as to utilize available resources and express questions and/or concerns in order to further facilitate the pt informed decision-making.  Resources and information provided and reviewed.    Patient is aware of [facility]'s affiliation and/or partnership with agencies in home health care, LTAC, SNF, [facility], and other hospitals and clinics.    Understanding: Patient reports having a clear understanding of the many lifetime commitments involved with being a transplant recipient, including costs, compliance, medications, lab work, procedures, appointments, concrete and financial planning, preparedness, timely  and appropriate communication of concerns, abstinence (ETOH, tobacco, illicit non-prescribed drugs), adherence to all health care team recommendations, support system and caregiver involvement, appropriate and timely resource utilization and follow-through, mental health counseling as needed/recommended, and patient and caregiver responsibilities.  Social Service Handbook, resources and detailed educational information provided and reviewed.  Educational information provided.    Patient also reports current and expected compliance with health care regime and states having a clear understanding of the importance of compliance.      Patient reports a clear understanding that risks and benefits may be involved with organ transplantation and with organ donation.       Patient also reports clear understanding that psychosocial risk factors may affect patient, and include but are not limited to feelings of depression, generalized anxiety, anxiety regarding dependence on others, post traumatic stress disorder, feelings of guilt and other emotional and/or mental concerns, and/or exacerbation of existing mental health concerns.  Detailed resources provided and discussed.      Patient agrees to access appropriate resources in a timely manner as needed and/or as recommended, and to communicate concerns appropriately.  Patient also reports a clear understanding of treatment options available.      Patient and Caregiver received education in a group setting.   reviewed education, provided additional information, and answered questions.    Feelings or Concerns: Pt reports no concerns and eagerness for transplant.      Coping: Identify Patient & Caregiver Strategies to [place]:   1. Currently & Pre-transplant - spending time with children and friends    2. At the time of surgery - visits from friends/children   3. During post-Transplant & Recovery Period - prayerfully having kids visit him     Goals: Pt plans to return  to work and has a good relationship with previous employer.     Interview Behavior: Patient presents as alert and oriented x 4, well groomed, recall good, concentration/judgement good, average intelligence, calm and pt was wearing sunglasses throughout the interview.       Transplant Social Work - Candidacy  Assessment/Plan:     Psychosocial Suitability: Based on psychosocial risk factors, patient presents as high risk, due to absence of caregiver. Caregiver will need to be confirmed prior to transplant.      Recommendations/Additional Comments: SW recommends that pt conduct fundraising to assist pt with pay for cost of medications, food, gas, and other transplant related needs. SW recommends that pt remain aware of potential mental health concerns and contact the team if any concerns arise. SW recommends that pt remain abstinent from tobacco, ETOH, and drug use. SW supports pt's continued dialysis adherence. SW remains available to answer any questions or concerns that arise as the pt moves through the transplant process.     Hemanth Portillo, DAVID, LMSW

## 2022-10-03 LAB — HPRA INTERPRETATION: NORMAL

## 2022-10-15 LAB
CLASS I ANTIBODIES - LUMINEX: NEGATIVE
CLASS II ANTIBODIES - LUMINEX: NEGATIVE
CPRA %: 0
SERUM COLLECTION DT - LUMINEX CLASS I: NORMAL
SERUM COLLECTION DT - LUMINEX CLASS II: NORMAL
SPCL1 TESTING DATE: NORMAL
SPCL2 TESTING DATE: NORMAL
SPCLU TESTING DATE: NORMAL

## 2022-10-26 PROCEDURE — 99001 SPECIMEN HANDLING PT-LAB: CPT | Mod: HCNC,PO,TXP | Performed by: NURSE PRACTITIONER

## 2022-10-28 ENCOUNTER — LAB VISIT (OUTPATIENT)
Dept: LAB | Facility: HOSPITAL | Age: 52
End: 2022-10-28
Payer: MEDICARE

## 2022-10-28 DIAGNOSIS — Z76.82 PRE-KIDNEY TRANSPLANT, LISTED: ICD-10-CM

## 2022-11-02 NOTE — PATIENT INSTRUCTIONS
1. Stay active/exercise   2. Seek living donors and have them contact the living donor coordinator  3. Practice good hand hygiene and social distancing   4. Submit a urine sample today      Patient is here for a Nurse Check.      Is the patient on an active anti-cancer treatment plan?   Yes,   Regimen: Paclitaxel (PROTEIN BOUND) 125 mg/m2 + GemCITAbine 1000 mg/m2 D1, 8, 15 every   Cbc cmp mg drawn today    HELD 10-31-22 MD check out note  Treatment orders per Dr. Painter:  Hold Chemotherpay  IVF one litre x 3 days  Dex + ondansetron x 3  Pepcid x 3 days  Mag 2 gm today  Morphine IV 2 mg today     Discussed lab results form today with Gabi Michelle PAC: orders for IV magnesium today with the IV zofran:IV dex: iv pepcid and fluids    Oral Chemotherapy: No    ECOG   ECOG Performance Status         Nursing Assessment:   A focused nursing assessment addressing the toxicity of chemotherapy was performed and the patient reports the following:  Anxiety/Depression/Insomnia: Anxiety: No, Depression: No and Insomnia: No  Pain: NO    Toxicity Assessment    Auditory/Ear  Assessment: Yes (Within Defined Limits)    Cardiac General  Assessment: Yes (w/ Exceptions to WDL)  Hypertension: Grade 2    Constitutional  Assessment: Yes (w/ Exceptions to WDL)  Fatigue: Grade 1 (chronic; improved from yesterday)    Dermatology/Skin  Assessment: Yes (w/ Exceptions to WDL)  Alopecia: Grade 2    Endocrine  Assessment: Yes (Within Defined Limits)    Gastrointestinal  Assessment: Yes (w/ Exceptions to WDL)  Diarrhea: Grade 1 (intermittently)    Hemorrhage/Bleeding  Assessment: Yes (Within Defined Limits)    Infection  Assessment: Yes (Within Defined Limits)    Lymphatics  Assessment: Yes (Within Defined Limits)    Musculoskeletal  Assessment: Yes (w/ Exceptions to WDL)  Generalized Muscle Weakness: Grade 2    Neurology  Assessment: Yes (Within Defined Limits)    Ocular  Assessment: Yes (Within Defined Limits)    Pain  Assessment: Yes (Within Defined Limits)    Pulmonary/Upper Respiratory  Assessment: Yes (Within Defined Limits)    Genitourinary  Assessment: Yes (Within Defined Limits)        Is this patient status post Stem Cell  Transplant? No     Dr Lluvia Samaniego is supervising clinician today.    Vitals:    11/02/22 0907   BP: 133/83   BP Location: LUE - Left upper extremity   Patient Position: Sitting   Cuff Size: Small Adult   Pulse: 61   Resp: 18   Temp: 98.4 °F (36.9 °C)   TempSrc: Oral   SpO2: 96%   PainSc:  0        Central Line Care: See Invasive (Oncology) Lines Flowsheet and MAR for CVAD documentation as appropriate    Transfusion: Not needed    Education: Patient Education: Other teaching: plan of care    Next appointment scheduled:   Future Appointments   Date Time Provider Department Center   11/7/2022  1:15 [facility]   11/7/2022  1:45 PM Darren Painter MD [address]   5/30/2023  7:00 AM SOFYA Quarles       Patient instructed to call the office with any questions or concerns.    Patient Discharged: patient discharged to home per self, ambulatory

## 2022-11-28 PROCEDURE — 86832 HLA CLASS I HIGH DEFIN QUAL: CPT | Mod: HCNC,PO,TXP | Performed by: NURSE PRACTITIONER

## 2022-11-28 PROCEDURE — 86833 HLA CLASS II HIGH DEFIN QUAL: CPT | Mod: HCNC,PO,TXP | Performed by: NURSE PRACTITIONER

## 2022-12-01 ENCOUNTER — LAB VISIT (OUTPATIENT)
Dept: LAB | Facility: HOSPITAL | Age: 52
End: 2022-12-01
Payer: MEDICARE

## 2022-12-01 DIAGNOSIS — Z76.82 PRE-KIDNEY TRANSPLANT, LISTED: ICD-10-CM

## 2022-12-09 LAB — HPRA INTERPRETATION: NORMAL

## 2022-12-19 PROCEDURE — 99001 SPECIMEN HANDLING PT-LAB: CPT | Mod: HCNC,PO,TXP | Performed by: NURSE PRACTITIONER

## 2022-12-21 ENCOUNTER — LAB VISIT (OUTPATIENT)
Dept: LAB | Facility: HOSPITAL | Age: 52
End: 2022-12-21
Payer: MEDICARE

## 2022-12-21 DIAGNOSIS — Z76.82 PRE-KIDNEY TRANSPLANT, LISTED: ICD-10-CM

## 2023-01-04 ENCOUNTER — TELEPHONE (OUTPATIENT)
Dept: TRANSPLANT | Facility: CLINIC | Age: 53
End: 2023-01-04
Payer: MEDICARE

## 2023-01-04 ENCOUNTER — DOCUMENTATION ONLY (OUTPATIENT)
Dept: TRANSPLANT | Facility: CLINIC | Age: 53
End: 2023-01-04
Payer: MEDICARE

## 2023-01-04 DIAGNOSIS — Z76.82 AWAITING ORGAN TRANSPLANT STATUS: Primary | ICD-10-CM

## 2023-01-04 NOTE — TELEPHONE ENCOUNTER
"ON CALL NOTE       ISREAL contacted pt at updated number ([phone]) to confirm caregiver plan. Pt reports "well it may or may not be my mom because she is recovering right now and my sister is caring for her.  Pt became very elusive with plan. SW verbalized understanding and inquired about additional caregiver plans. Pt reports, "well, it could be Kim. Maybe Kim. But you know, people have their own stuff going on right now. I will have someone".  Pt appeared very uncertain regarding plan. ISREAL requested permission to speak with Kim and SW granted confirmation.     ISREAL contacted Kim [phone], who reports she is currently 5hrs away due to caring for new born grandchild. SW verbalized understanding and inquired if she will act as primary caregiver for pt post transplant, given her current obligations. Kim reports, "Now for full disclosure , will all of us be able to help since he will be immunosuppressed? I definitely  want to do this for him but we all will need to rotate. Like, me, his sister and mom can make this work". ISREAL verbalized understanding and provided caregiver education. SW expressed pt concerns regarding uncertainty of solid caregiver plan. Kim reports "he just not the type to ask for help and we aren't together anymore. He probably just doesn't want to be a burden but I can help".     ISREAL followed up with pt to secure Gertrude's (pt's sister) contact information to provide caregiver plans. Pt provided contact ([phone]) and permission to confirm plan with Gertrude. ISREAL verbalized understanding. ISREAL also expressed concern regarding Kim's concern that pt does not desire to burden family/friends for care. ISREAL attempted to express the need for pt to openly seek/receive care from caregivers in order to have a healthy post transplant recovery. To this point, pt grew very aggressive, agitated and yelled at ISREAL "Oh no oh no, you don't ask her that! She is not my mother and I am the pt. It " "should have been a yes or no". SW stressed there was no need to yell and SW is only here to help solidify pt's plans. Pt continued to stress "you are to listen to me! You are raising my blood pressure and I am talking like this so you can hear me!" SW expressed need to contact SW as needed after speaking with sister. Pt hung up on SW.     SW received return call from pt's sister Gertrude who reports "yes, we will be able to do that for  my brother. I love my brother". SW inquired of mother's care. Gertrude reports we live right around the corner (from [facility] main campus) and she has home health. She also does a lot for herself right now". ISREAL verbalized understanding and inquired about transportation plan. Gertrude reports she does not have a car and neither does pt. Gertrude reports pair will be able to utilize Uber/Lyft, as needed. Gertrude also reports he boyfriend drives his own vehicle and will be able to assist. ISREAL verbalized understanding and agreement.     ISREAL notified RN coordinators ZEENAT Romo and JB Bhatia.     SW remains available to pt and family.       "

## 2023-01-04 NOTE — PROGRESS NOTES
ON-CALL NOTE    UNOS#[id number]    Notified by Epi Pollock, , that Misael Owens is eligible for kidney offer.  Spoke with patient and identified no acute medical issues with telephone assessment. Protocol script read to patient regarding PHS risk and HCV+ donor offer . Patient verbalized understanding, all questions answered, patient accepts organ offer. Notified by Epi Pollock that virtual crossmatch is negative.  Current sample of blood is available from date 12/19/22 for crossmatch.  Patient reports no sensitizing event since last blood sample for PRA received. Notified Alfredo in HLA Lab to perform a retrospective  crossmatch per guideline.    Patient was asked if they have had a positive COVID-19 test or if they have any signs or symptoms. Informed patient that they will be tested for COVID-19 upon arrival to the hospital, unless have a previous positive result. If tested and result is positive, the transplant will not be able to occur, they will be inactivated on the wait list for 28 days per protocol and required to quarantine.     Patient notified of plan that this is a kidney backup offer and to continue all normal activities and states understanding.  Dialysis unit will be notified if admitted.

## 2023-01-04 NOTE — TELEPHONE ENCOUNTER
ON CALL NOTE  UNOS# [id number]  I received report from Katharina Torres at 7:15 AM  11:23  I called patient with update on case. Patient informed that the donor OR has not been set. I will call with updates as I receive them. Patient remains back-up candidate with no restrictions at this time. Patient will call with any questions. On Call coordinator contact information reviewed and patient verbalized understanding.

## 2023-01-05 NOTE — PROGRESS NOTES
On-Call Note  UNOS# [id number]    Received report from Christine Shirley, [facility] care. DBD donor, increased risk, HCV+, PHS assessment/HCV script Katharina Morelos. Pt accepted kidney offer as a back-up candidate. Updated of donor's OR time, not NPO, and waiting at home on stand-by. Pt reports he last dialyzed Mon, 1/2/23. Did not report to dialysis Wed, anticipating being admitted for transplant. All other questions and concerns were addressed.

## 2023-01-05 NOTE — TELEPHONE ENCOUNTER
ON CALL NOTE  UNOS# [id number]  I received update from Antonio that donor OR is set for 10:00 am on 1/5/2023. Patient informed and verbalized understanding.

## 2023-01-26 ENCOUNTER — TELEPHONE (OUTPATIENT)
Dept: INTERNAL MEDICINE | Facility: CLINIC | Age: 53
End: 2023-01-26
Payer: MEDICARE

## 2023-01-26 NOTE — TELEPHONE ENCOUNTER
----- Message from Lisandra Roe sent at 1/26/2023 11:07 AM CST -----  Contact: Patient  Type:  Sooner Appointment Request      Name of Caller:Patient   When is the first available appointment?02/28/2023 Tues or Thurs  Symptoms:check up  Would the patient rather a call back or a response via [facility]? Call back   Best Call Back Number:[phone]  Additional Information: Please assist       Patient also requesting a approve referral for [employer] due to dialysis claim     Patient stated for treatments to get pay

## 2023-01-30 ENCOUNTER — TELEPHONE (OUTPATIENT)
Dept: INTERNAL MEDICINE | Facility: CLINIC | Age: 53
End: 2023-01-30
Payer: MEDICARE

## 2023-01-30 NOTE — TELEPHONE ENCOUNTER
Pt states that he spoke w/ [employer] and they stated that in order for him to continue dialysis he will need an approval referral from his pcp. He states that they would like it faxed over to [employer]. Pt will call back to provide fax. I notified him that i would send a message to the provider. Pt understood.

## 2023-01-30 NOTE — TELEPHONE ENCOUNTER
----- Message from Lia Camara sent at 1/30/2023  8:55 AM CST -----  Contact: Self/[phone]  Pt's relative sent a portal message stating that  need an approve refural send to [employer] from my primary doctor please. It's concerning my treatment at dialysis clinic. Please advise

## 2023-02-05 NOTE — LETTER
December 9, 2019      Katharina Ricci MD  [address] [facility] [address]           [facility] - Nephrology  [address]  [place] [address]  Phone: [phone]  Fax: [phone]          Patient: Misael Owens   MR Number: [id number]   YOB: 1970   Date of Visit: 12/9/2019       Dear Dr. Katharina Ricci:    Thank you for referring Misael Owens to me for evaluation. Attached you will find relevant portions of my assessment and plan of care.    If you have questions, please do not hesitate to call me. I look forward to following Misael Owens along with you.    Sincerely,    DO Alexa Almeidaosure  CC:  No Recipients    If you would like to receive this communication electronically, please contact [email or web address] or [phone] to request more information on [facility] access.    For providers and/or their staff who would like to refer a patient to [facility], please contact us through our one-stop-shop provider referral line, [facility] , at [phone].    If you feel you have received this communication in error or would no longer like to receive these types of communications, please e-mail [email or web address]         
Martin Rogel(Resident)

## 2023-04-06 ENCOUNTER — TELEPHONE (OUTPATIENT)
Dept: INTERNAL MEDICINE | Facility: CLINIC | Age: 53
End: 2023-04-06
Payer: MEDICARE

## 2023-04-06 NOTE — TELEPHONE ENCOUNTER
Attempted to call pt to reschedule appointment  Unable to leave voice message , phone is not excepting calls  Will also send portal message

## 2023-05-16 DIAGNOSIS — I10 ESSENTIAL HYPERTENSION: ICD-10-CM

## 2023-05-16 DIAGNOSIS — I12.0 CKD STAGE 5 SECONDARY TO HYPERTENSION: ICD-10-CM

## 2023-05-16 DIAGNOSIS — N18.5 CKD STAGE 5 SECONDARY TO HYPERTENSION: ICD-10-CM

## 2023-05-16 RX ORDER — ERGOCALCIFEROL 1.25 MG/1
50000 CAPSULE ORAL
Qty: 12 CAPSULE | Refills: 3 | Status: SHIPPED | OUTPATIENT
Start: 2023-05-16

## 2023-05-16 RX ORDER — FUROSEMIDE 40 MG/1
40 TABLET ORAL DAILY
Qty: 90 TABLET | Refills: 3 | Status: SHIPPED | OUTPATIENT
Start: 2023-05-16

## 2023-05-16 RX ORDER — LABETALOL 200 MG/1
200 TABLET, FILM COATED ORAL EVERY 12 HOURS
Qty: 60 TABLET | Refills: 4 | Status: SHIPPED | OUTPATIENT
Start: 2023-05-16

## 2023-05-16 RX ORDER — CALCITRIOL 0.5 UG/1
0.5 CAPSULE ORAL DAILY
Qty: 30 CAPSULE | Refills: 3 | Status: SHIPPED | OUTPATIENT
Start: 2023-05-16 | End: 2023-05-16

## 2023-05-16 RX ORDER — CALCITRIOL 0.5 UG/1
0.5 CAPSULE ORAL DAILY
Qty: 30 CAPSULE | Refills: 3 | Status: SHIPPED | OUTPATIENT
Start: 2023-05-16

## 2023-05-17 DIAGNOSIS — Z76.82 PRE-KIDNEY TRANSPLANT, LISTED: Primary | ICD-10-CM

## 2023-05-22 DIAGNOSIS — Z12.5 SCREENING PSA (PROSTATE SPECIFIC ANTIGEN): Primary | ICD-10-CM

## 2023-05-22 DIAGNOSIS — Z12.5 ENCOUNTER FOR SCREENING FOR MALIGNANT NEOPLASM OF PROSTATE: ICD-10-CM

## 2023-05-22 DIAGNOSIS — Z76.82 ORGAN TRANSPLANT CANDIDATE: ICD-10-CM

## 2023-05-23 ENCOUNTER — TELEPHONE (OUTPATIENT)
Dept: TRANSPLANT | Facility: CLINIC | Age: 53
End: 2023-05-23
Payer: MEDICARE

## 2023-05-23 DIAGNOSIS — Z76.82 ORGAN TRANSPLANT CANDIDATE: Primary | ICD-10-CM

## 2023-05-23 NOTE — TELEPHONE ENCOUNTER
Spoke to pt confirming appts on 08/25/2023. Appt reminders were mailed on 05/23/2023 and pt is aware to bring care giver.

## 2023-06-06 ENCOUNTER — PATIENT MESSAGE (OUTPATIENT)
Dept: TRANSPLANT | Facility: CLINIC | Age: 53
End: 2023-06-06
Payer: MEDICARE

## 2023-08-08 DIAGNOSIS — Z76.82 ORGAN TRANSPLANT CANDIDATE: Primary | ICD-10-CM

## 2023-08-10 DIAGNOSIS — Z76.82 AWAITING ORGAN TRANSPLANT STATUS: Primary | ICD-10-CM

## 2023-08-11 ENCOUNTER — TELEPHONE (OUTPATIENT)
Dept: TRANSPLANT | Facility: CLINIC | Age: 53
End: 2023-08-11
Payer: MEDICARE

## 2023-08-11 NOTE — TELEPHONE ENCOUNTER
ON-CALL NOTE    UNOS# [id number]    Notified by Antonio Green, , that Misael Owens is eligible for kidney offer.  Spoke with patient and identified no acute medical issues with telephone assessment. Protocol script read to patient regarding PHS risk and HCV+ donor offer . Patient verbalized understanding, all questions answered, patient accepts organ offer. Notified by Antonio Green that virtual crossmatch is negative.  Current sample of blood is available from date June 28, 202 for crossmatch.  Patient reports no sensitizing event since last blood sample for PRA received. Notified Leo in HLA Lab to perform a prospective  crossmatch per guideline.    Patient was asked if they have had a positive COVID-19 test or if they have any signs or symptoms. Informed patient that they will be tested for COVID-19 upon arrival to the hospital, unless have a previous positive result. If tested and result is positive, the transplant will not be able to occur, they will be inactivated on the wait list for 21 days per protocol and required to quarantine.     Patient notified of plan to remain at home and await further updates, states understanding.     8/10/2023 @ 1440 pm - Spoke with patient. Informed patient that the organ went to Primary recipient and that he is released from the case. Informed him that he can continue with his daily activities. All questions answered.

## 2023-08-22 ENCOUNTER — TELEPHONE (OUTPATIENT)
Dept: TRANSPLANT | Facility: CLINIC | Age: 53
End: 2023-08-22
Payer: MEDICARE

## 2023-08-22 DIAGNOSIS — Z76.82 PRE-KIDNEY TRANSPLANT, LISTED: Primary | ICD-10-CM

## 2023-08-22 NOTE — TELEPHONE ENCOUNTER
I have attempted without success to contact this patient by phone to reschedule appts for test and clinic visit..I could not leave voice message. Pt's phone was giving the busy signal and stating not accepting calls at this time.

## 2023-08-23 ENCOUNTER — TELEPHONE (OUTPATIENT)
Dept: TRANSPLANT | Facility: CLINIC | Age: 53
End: 2023-08-23
Payer: MEDICARE

## 2023-08-23 NOTE — TELEPHONE ENCOUNTER
I called pt's phone and is still getting the busy signal. I reached out to pt's significant other, she was not currently home and said she would let pt use her phone to call me when she returns.

## 2023-08-23 NOTE — TELEPHONE ENCOUNTER
----- Message from Anabel Romo RN sent at 8/23/2023  1:53 PM CDT -----  Regarding: FW: r/s appt Friday 8/25  Contact: PT r/s appts    ----- Message -----  From: Cee Li  Sent: 8/23/2023   1:31 PM CDT  To: Kidney Waitlisted Coordinator  Subject: r/s appt Friday 8/25                             The patient called requesting r/s appts currently scheduled for Friday  Please call at your earliest convenience to r/s  Patient states also left message yesterday   No further information provided      Patient can be contacted @#   (NEW PH#) [phone]

## 2023-08-23 NOTE — TELEPHONE ENCOUNTER
Spoke to pt confirming rescheduled appts on 10/31/2023. Appt reminders were mailed on 08/23/2023 and pt is aware to bring care giver.

## 2023-08-23 NOTE — TELEPHONE ENCOUNTER
I spoke to pt's significant other due to pt's phone giving the busy signal. She stated pt is currently at dialysis and would let pt know to give me a call when he returns.

## 2023-10-19 ENCOUNTER — TELEPHONE (OUTPATIENT)
Dept: TRANSPLANT | Facility: CLINIC | Age: 53
End: 2023-10-19
Payer: MEDICARE

## 2023-10-19 NOTE — TELEPHONE ENCOUNTER
MA notes per Adherence form     FOR THE PAST THREE MONTHS:    5-AMA's 07/19/23 08/07/23 59 min, 08/16/23 55 min, pt request, 07/31/23 10/06/23 62 min late arrival unable to extend tx     1-No-shows 09/04/23 pt refusal     No concerns with care giving, transportation, or mental health    Scanned in pt's media    Fernanda Clemons  Abdominal Transplant MA

## 2023-10-31 ENCOUNTER — OFFICE VISIT (OUTPATIENT)
Dept: TRANSPLANT | Facility: CLINIC | Age: 53
End: 2023-10-31
Payer: MEDICARE

## 2023-10-31 ENCOUNTER — HOSPITAL ENCOUNTER (OUTPATIENT)
Dept: CARDIOLOGY | Facility: HOSPITAL | Age: 53
Discharge: HOME | End: 2023-10-31
Attending: NURSE PRACTITIONER
Payer: MEDICARE

## 2023-10-31 ENCOUNTER — HOSPITAL ENCOUNTER (OUTPATIENT)
Dept: RADIOLOGY | Facility: HOSPITAL | Age: 53
Discharge: HOME | End: 2023-10-31
Attending: NURSE PRACTITIONER
Payer: MEDICARE

## 2023-10-31 VITALS
DIASTOLIC BLOOD PRESSURE: 85 MMHG | HEIGHT: 71 IN | TEMPERATURE: 97 F | RESPIRATION RATE: 16 BRPM | WEIGHT: 206.56 LBS | BODY MASS INDEX: 28.92 KG/M2 | HEART RATE: 90 BPM | OXYGEN SATURATION: 97 % | SYSTOLIC BLOOD PRESSURE: 140 MMHG

## 2023-10-31 VITALS
DIASTOLIC BLOOD PRESSURE: 77 MMHG | HEIGHT: 71 IN | SYSTOLIC BLOOD PRESSURE: 140 MMHG | BODY MASS INDEX: 28.42 KG/M2 | HEART RATE: 75 BPM | WEIGHT: 203 LBS

## 2023-10-31 DIAGNOSIS — D63.1 ANEMIA OF CHRONIC KIDNEY FAILURE, STAGE 5: ICD-10-CM

## 2023-10-31 DIAGNOSIS — Z99.2 ESRD ON DIALYSIS: Chronic | ICD-10-CM

## 2023-10-31 DIAGNOSIS — N25.81 HYPERPARATHYROIDISM DUE TO ESRD ON DIALYSIS: ICD-10-CM

## 2023-10-31 DIAGNOSIS — E66.3 OVERWEIGHT WITH BODY MASS INDEX (BMI) OF 28 TO 28.9 IN ADULT: ICD-10-CM

## 2023-10-31 DIAGNOSIS — Z99.2 HYPERPARATHYROIDISM DUE TO ESRD ON DIALYSIS: ICD-10-CM

## 2023-10-31 DIAGNOSIS — Z76.82 ORGAN TRANSPLANT CANDIDATE: ICD-10-CM

## 2023-10-31 DIAGNOSIS — N18.6 ESRD ON DIALYSIS: Chronic | ICD-10-CM

## 2023-10-31 DIAGNOSIS — Z76.82 PATIENT ON WAITING LIST FOR KIDNEY TRANSPLANT: Primary | ICD-10-CM

## 2023-10-31 DIAGNOSIS — N18.6 BENIGN HYPERTENSION WITH ESRD (END-STAGE RENAL DISEASE): ICD-10-CM

## 2023-10-31 DIAGNOSIS — N18.5 ANEMIA OF CHRONIC KIDNEY FAILURE, STAGE 5: ICD-10-CM

## 2023-10-31 DIAGNOSIS — N18.6 HYPERPARATHYROIDISM DUE TO ESRD ON DIALYSIS: ICD-10-CM

## 2023-10-31 DIAGNOSIS — I12.0 BENIGN HYPERTENSION WITH ESRD (END-STAGE RENAL DISEASE): ICD-10-CM

## 2023-10-31 PROBLEM — I10 ESSENTIAL HYPERTENSION: Status: RESOLVED | Noted: 2019-11-15 | Resolved: 2023-10-31

## 2023-10-31 LAB
ASCENDING AORTA: 3.68 CM
AV INDEX (PROSTH): 0.87
AV MEAN GRADIENT: 4 MMHG
AV PEAK GRADIENT: 6 MMHG
AV VALVE AREA BY VELOCITY RATIO: 4.42 CM²
AV VALVE AREA: 4.42 CM²
AV VELOCITY RATIO: 0.87
BSA FOR ECHO PROCEDURE: 2.15 M2
CV ECHO LV RWT: 0.37 CM
DOP CALC AO PEAK VEL: 1.26 M/S
DOP CALC AO VTI: 24.28 CM
DOP CALC LVOT AREA: 5.1 CM2
DOP CALC LVOT DIAMETER: 2.54 CM
DOP CALC LVOT PEAK VEL: 1.1 M/S
DOP CALC LVOT STROKE VOLUME: 107.37 CM3
DOP CALCLVOT PEAK VEL VTI: 21.2 CM
E WAVE DECELERATION TIME: 255.27 MSEC
E/A RATIO: 1.05
E/E' RATIO: 10.62 M/S
ECHO LV POSTERIOR WALL: 0.87 CM (ref 0.6–1.1)
FRACTIONAL SHORTENING: 43 % (ref 28–44)
INTERVENTRICULAR SEPTUM: 0.99 CM (ref 0.6–1.1)
LA MAJOR: 4.83 CM
LA MINOR: 5.15 CM
LA WIDTH: 4.22 CM
LEFT ATRIUM SIZE: 3.52 CM
LEFT ATRIUM VOLUME INDEX MOD: 33.5 ML/M2
LEFT ATRIUM VOLUME INDEX: 29.7 ML/M2
LEFT ATRIUM VOLUME MOD: 70.94 CM3
LEFT ATRIUM VOLUME: 62.94 CM3
LEFT INTERNAL DIMENSION IN SYSTOLE: 2.67 CM (ref 2.1–4)
LEFT VENTRICLE DIASTOLIC VOLUME INDEX: 47.34 ML/M2
LEFT VENTRICLE DIASTOLIC VOLUME: 100.36 ML
LEFT VENTRICLE MASS INDEX: 69 G/M2
LEFT VENTRICLE SYSTOLIC VOLUME INDEX: 12.4 ML/M2
LEFT VENTRICLE SYSTOLIC VOLUME: 26.22 ML
LEFT VENTRICULAR INTERNAL DIMENSION IN DIASTOLE: 4.66 CM (ref 3.5–6)
LEFT VENTRICULAR MASS: 147.01 G
LV LATERAL E/E' RATIO: 11.5 M/S
LV SEPTAL E/E' RATIO: 9.86 M/S
MV A" WAVE DURATION": 7.99 MSEC
MV PEAK A VEL: 0.66 M/S
MV PEAK E VEL: 0.69 M/S
MV STENOSIS PRESSURE HALF TIME: 74.03 MS
MV VALVE AREA P 1/2 METHOD: 2.97 CM2
PULM VEIN S/D RATIO: 1.46
PV PEAK D VEL: 0.37 M/S
PV PEAK S VEL: 0.54 M/S
RA MAJOR: 4.21 CM
RA PRESSURE ESTIMATED: 3 MMHG
RA WIDTH: 2.96 CM
RIGHT VENTRICULAR END-DIASTOLIC DIMENSION: 3.63 CM
SINUS: 3.82 CM
STJ: 3.42 CM
TDI LATERAL: 0.06 M/S
TDI SEPTAL: 0.07 M/S
TDI: 0.07 M/S
TRICUSPID ANNULAR PLANE SYSTOLIC EXCURSION: 2.51 CM
Z-SCORE OF LEFT VENTRICULAR DIMENSION IN END DIASTOLE: -3.63
Z-SCORE OF LEFT VENTRICULAR DIMENSION IN END SYSTOLE: -3.37

## 2023-10-31 PROCEDURE — 3077F PR MOST RECENT SYSTOLIC BLOOD PRESSURE >= 140 MM HG: ICD-10-PCS | Mod: CPTII,S$GLB,TXP, | Performed by: NURSE PRACTITIONER

## 2023-10-31 PROCEDURE — 3079F PR MOST RECENT DIASTOLIC BLOOD PRESSURE 80-89 MM HG: ICD-10-PCS | Mod: CPTII,S$GLB,TXP, | Performed by: NURSE PRACTITIONER

## 2023-10-31 PROCEDURE — 3008F BODY MASS INDEX DOCD: CPT | Mod: CPTII,S$GLB,TXP, | Performed by: NURSE PRACTITIONER

## 2023-10-31 PROCEDURE — 93306 TTE W/DOPPLER COMPLETE: CPT | Mod: HCNC,TXP

## 2023-10-31 PROCEDURE — 1159F MED LIST DOCD IN RCRD: CPT | Mod: CPTII,S$GLB,TXP, | Performed by: NURSE PRACTITIONER

## 2023-10-31 PROCEDURE — 76770 US RETROPERITONEAL COMPLETE: ICD-10-PCS | Mod: 26,TXP,, | Performed by: INTERNAL MEDICINE

## 2023-10-31 PROCEDURE — 93306 TTE W/DOPPLER COMPLETE: CPT | Mod: 26,TXP,, | Performed by: INTERNAL MEDICINE

## 2023-10-31 PROCEDURE — 1160F RVW MEDS BY RX/DR IN RCRD: CPT | Mod: CPTII,S$GLB,TXP, | Performed by: NURSE PRACTITIONER

## 2023-10-31 PROCEDURE — 76770 US EXAM ABDO BACK WALL COMP: CPT | Mod: 26,TXP,, | Performed by: INTERNAL MEDICINE

## 2023-10-31 PROCEDURE — 3008F PR BODY MASS INDEX (BMI) DOCUMENTED: ICD-10-PCS | Mod: CPTII,S$GLB,TXP, | Performed by: NURSE PRACTITIONER

## 2023-10-31 PROCEDURE — 1160F PR REVIEW ALL MEDS BY PRESCRIBER/CLIN PHARMACIST DOCUMENTED: ICD-10-PCS | Mod: CPTII,S$GLB,TXP, | Performed by: NURSE PRACTITIONER

## 2023-10-31 PROCEDURE — 99999 PR PBB SHADOW E&M-EST. PATIENT-LVL IV: CPT | Mod: PBBFAC,HCNC,TXP, | Performed by: NURSE PRACTITIONER

## 2023-10-31 PROCEDURE — 3079F DIAST BP 80-89 MM HG: CPT | Mod: CPTII,S$GLB,TXP, | Performed by: NURSE PRACTITIONER

## 2023-10-31 PROCEDURE — 99215 PR OFFICE/OUTPT VISIT, EST, LEVL V, 40-54 MIN: ICD-10-PCS | Mod: S$GLB,TXP,, | Performed by: NURSE PRACTITIONER

## 2023-10-31 PROCEDURE — 71046 XR CHEST PA AND LATERAL: ICD-10-PCS | Mod: 26,TXP,, | Performed by: RADIOLOGY

## 2023-10-31 PROCEDURE — 1159F PR MEDICATION LIST DOCUMENTED IN MEDICAL RECORD: ICD-10-PCS | Mod: CPTII,S$GLB,TXP, | Performed by: NURSE PRACTITIONER

## 2023-10-31 PROCEDURE — 71046 X-RAY EXAM CHEST 2 VIEWS: CPT | Mod: TC,HCNC,TXP

## 2023-10-31 PROCEDURE — 3077F SYST BP >= 140 MM HG: CPT | Mod: CPTII,S$GLB,TXP, | Performed by: NURSE PRACTITIONER

## 2023-10-31 PROCEDURE — 99999 PR PBB SHADOW E&M-EST. PATIENT-LVL IV: ICD-10-PCS | Mod: PBBFAC,HCNC,TXP, | Performed by: NURSE PRACTITIONER

## 2023-10-31 PROCEDURE — 71046 X-RAY EXAM CHEST 2 VIEWS: CPT | Mod: 26,TXP,, | Performed by: RADIOLOGY

## 2023-10-31 PROCEDURE — 93306 ECHO (CUPID ONLY): ICD-10-PCS | Mod: 26,TXP,, | Performed by: INTERNAL MEDICINE

## 2023-10-31 PROCEDURE — 76770 US EXAM ABDO BACK WALL COMP: CPT | Mod: TC,HCNC,TXP

## 2023-10-31 PROCEDURE — 99215 OFFICE O/P EST HI 40 MIN: CPT | Mod: S$GLB,TXP,, | Performed by: NURSE PRACTITIONER

## 2023-10-31 NOTE — PROGRESS NOTES
INITIAL PATIENT EDUCATION NOTE    Mr. Misael Owens was seen in pre-kidney transplant clinic for evaluation for kidney, kidney/pancreas or pancreas only transplant.  The patient attended a an individual video education session that discussed/reviewed the following aspects of transplantation: evaluation including diagnostic and laboratory testing,( Chemistries, Hematology, Serologies including HIV and Hepatitis and HLA) required for transplantation and selection committee process, UNOS waitlist management/multiple listings, types of organs offered (KDPI < 85%, KDPI > 85%, PHS risk, DCD, HCV+, HIV+ for HIV+ recipients and enbloc/dual), financial aspects, surgical procedures, dietary instruction pre- and post-transplant, health maintenance pre- and post-transplant, post-transplant hospitalization and outpatient follow-up, potential to participate in a research protocol, and medication management and side effects.  A question and answer session was provided after the presentation.    The patient was seen by all members of the multi-disciplinary team to include: Nephrologist/NICHELLE, Surgeon, , Transplant Coordinator, , Pharmacist and Dietician (if applicable).    The patient reviewed and signed all consents for evaluation which were witnessed and sent to scanning into the [facility] chart.    The patient was given an education book and plan for further evaluation based on his individual assessment.      Reviewed program requirement for complete COVID vaccination with documentation prior to listing.  COVID education information reviewed with patient. Patient encouraged to be up to date on all vaccinations.       The patient was informed that the transplant team would manage immediate post op pain. If the patient requires long term pain management, they will need to have that pain management addressed by their PCP or previous provider who wrote for long term pain medicines.    The patient was  encouraged to call with any questions or concerns.

## 2023-10-31 NOTE — PROGRESS NOTES
Kidney Transplant Recipient Reevalulation    Referring Physician: Matthew Moreno  Current Nephrologist: Matthew Moreno  Waitlist Status: active  Dialysis Start Date: 4/1/2020    Subjective:     CC:  Annual reassessment of kidney transplant candidacy.    HPI:  Mr. Owens is a 53 y.o. year old Black or  male with ESRD secondary to HTN.  He has been on the wait list for a kidney transplant at [facility] since 4/1/2020. Patient is currently on hemodialysis started on 3/2020. Patient is dialyzing on MWF schedule.  Patient reports that he is tolerating dialysis well.. He has a LUE AV fistula. Patient denies any recent hospitalizations or ED visits.e is dialyzing for 4 hours for session.     Hospitalizations/ED visits:  none    LOV 8/18/22    Interval History:   Reports doing well. Dialysis is going well. He is staying active with walking his dog. Denies CP, claudication or SOB with activity.      Lab /diagnostic results / txp wk up reviewed      CXR: 10/31/23 unremarkable   PXR:8/18/22: no visible vascular calcifications identified  Renal US:  10/31/23:  chronic medical renal disease.  No solid masses  Colonoscopy: 10/2020 normal repeat in 10 years    Echo: 10/31/23 results pending        05/22/20  Lexiscan Card Interp Cupid Stress test with myocardial perfusion    The perfusion scan is free of evidence from myocardial ischemia or injury.    There is mild  apical thinning which is a normal variant.    Visually estimated ejection fraction is normal at rest and normal at stress.    There is normal wall motion at rest and post stress.    LV cavity size is normal at rest and normal at stress.    The EKG portion of this study is negative for ischemia.    The patient reported no chest pain during the stress test.    There were no arrhythmias during stress.    Significant patient motion during stress test.    Lab Results   Component Value Date    PTH 1,202.0 (H) 10/31/2023    CALCIUM 9.1 01/15/2021    PHOS 4.7 (H)  "05/06/2020      PSA, Screen (ng/mL)   Date Value   08/18/2022 1.6          Past Medical History:   Diagnosis Date    CKD (chronic kidney disease), stage IV     due to hypertension    ESRD on dialysis     Hypertension     Secondary hyperparathyroidism     Vitamin D deficiency          Review of Systems   Constitutional:  Negative for appetite change, chills, fatigue and fever.   HENT:  Negative for trouble swallowing.    Respiratory:  Negative for cough, chest tightness, shortness of breath and wheezing.    Cardiovascular:  Negative for chest pain, palpitations and leg swelling.   Gastrointestinal:  Negative for abdominal pain, constipation, diarrhea and nausea.   Genitourinary:  Positive for decreased urine volume.   Musculoskeletal:  Negative for arthralgias and myalgias.   Skin:  Negative for rash.   Neurological:  Negative for dizziness, weakness, light-headedness and headaches.   Psychiatric/Behavioral:  Negative for sleep disturbance.        Objective:   body mass index is 28.81 kg/m².  BP (!) 140/85 (BP Location: Right arm, Patient Position: Sitting, BP Method: Medium (Automatic))   Pulse 90   Temp 97.3 °F (36.3 °C) (Temporal)   Resp 16   Ht 5' 11" (1.803 m)   Wt 93.7 kg (206 lb 9.1 oz)   SpO2 97%   BMI 28.81 kg/m²     Physical Exam  Constitutional:       General: He is not in acute distress.     Appearance: He is well-developed. He is not diaphoretic.   Cardiovascular:      Rate and Rhythm: Normal rate and regular rhythm.      Heart sounds: Normal heart sounds. No murmur heard.     No friction rub. No gallop.   Pulmonary:      Effort: Pulmonary effort is normal. No respiratory distress.      Breath sounds: Normal breath sounds. No wheezing or rales.   Abdominal:      General: Bowel sounds are normal. There is no distension.      Palpations: Abdomen is soft.      Tenderness: There is no abdominal tenderness.   Musculoskeletal:         General: No tenderness. Normal range of motion.        " "Arms:    Skin:     General: Skin is warm and dry.      Findings: No rash.      Nails: There is no clubbing.   Neurological:      Mental Status: He is alert and oriented to person, place, and time.   Psychiatric:         Behavior: Behavior normal.         Labs:  Lab Results   Component Value Date    WBC 6.43 01/15/2021    HGB 12.0 (L) 01/15/2021    HCT 38.2 (L) 01/15/2021     01/15/2021    K 3.8 01/15/2021    CL 95 01/15/2021    CO2 31 (H) 01/15/2021    BUN 55 (H) 01/15/2021    CREATININE 8.5 (H) 01/15/2021    EGFRNONAA 6.5 (A) 01/15/2021    CALCIUM 9.1 01/15/2021    PHOS 4.7 (H) 05/06/2020    MG 1.6 03/30/2020    ALBUMIN 4.4 01/15/2021    AST 26 01/15/2021    ALT 26 01/15/2021    UTPCR 0.61 (H) 05/06/2020    PTH 1,202.0 (H) 10/31/2023       Lab Results   Component Value Date    BILIRUBINUA Negative 05/06/2020    PROTEINUA 2+ (A) 05/06/2020    NITRITE n 06/25/2021    RBCUA 0 05/06/2020    WBCUA 1 05/06/2020       No results found for: "HLAABCTYPE"    Lab Results   Component Value Date    CPRA 0 05/15/2023    UE0AVVL B67 05/15/2023    CIABCLM WEAK---A1, A29 05/15/2023    CIIAB Negative 05/15/2023    ABCMT WEAK DQA1*05:05 06/27/2022       Labs were reviewed with the patient.    Pre-transplant Workup:   Reviewed with the patient.    Assessment:     1. Patient on waiting list for kidney transplant    2. ESRD on dialysis    3. Benign hypertension with ESRD (end-stage renal disease)    4. Hyperparathyroidism due to ESRD on dialysis    5. Anemia of chronic kidney failure, stage 5    6. Overweight with body mass index (BMI) of 28 to 28.9 in adult          Plan:   Echo: 10/31/23 results pending     Lab Results   Component Value Date    PTH 1,202.0 (H) 10/31/2023    CALCIUM 9.1 01/15/2021    PHOS 4.7 (H) 05/06/2020     Fax lab/PTH  to dialysis/nephrologist concerning  optimizing mgmt    Transplant Candidacy:   Mr. Owens is a suitable kidney transplant candidate.  Meets center eligibility for accepting HCV+ donor offer - " yes.  Patient educated on HCV+ donors. Misael is willing  to accept HCV+ donor offer -  yes   Patient is a candidate for KDPI > 85 kidney donor offer - no.due to weight  He  will remain active status at present.     Pam Syed NP       Follow-up:   In addition to the tests noted in the plan, Mr. Owens will continue to have reevaluation as per the standing pre-kidney transplant protocol:  Monthly blood for PRA  Annual return to clinic, except HIV positive, > 65 years of age, or pancreas transplant candidates who will be scheduled to see transplant every 6 months while in pre-transplant phase  Annual re-testing: CXR, EKG, yearly mammograms for women over 40 and PSA for males over 40, cardiology follow-up as recommended by initial cardiology pre-transplant evaluation  Renal ultrasound every 2 years  Baseline colonoscopy after age 50 and repeated as recommended    UNOS Patient Status  Functional Status: 60% - Requires occasional assistance but is able to care for needs  Physical Capacity: No Limitations

## 2023-10-31 NOTE — LETTER
November 7, 2023        Matthew Moreno  [address]  [facility] [address]  Phone: [phone]  Fax: [phone]             [facility]- Transplant 1st Fl  [address]  [facility] [address]  Phone: [phone]   Patient: Misael Owens   MR Number: [id number]   YOB: 1970   Date of Visit: 10/31/2023       Dear Dr. Matthew Moreno    Thank you for referring Misael Owens to me for evaluation. Attached you will find relevant portions of my assessment and plan of care.    If you have questions, please do not hesitate to call me. I look forward to following Misael Owens along with you.    Sincerely,    Pam Syed, NP    Enclosure    If you would like to receive this communication electronically, please contact [email or web address] or [phone] to request [employer] access.    [employer] is a tool which provides read-only access to select patient information with whom you have a relationship. Its easy to use and provides real time access to review your patients record including encounter summaries, notes, results, and demographic information.    If you feel you have received this communication in error or would no longer like to receive these types of communications, please e-mail [email or web address]

## 2023-11-01 NOTE — PROGRESS NOTES
YEARLY LIST MANAGEMENT NOTE    Misael Owens's kidney transplant listing status reviewed.  Patient is due for follow-up appointments on 10/31/24.  Appointments will be scheduled per protocol.

## 2023-11-07 ENCOUNTER — EPISODE CHANGES (OUTPATIENT)
Dept: TRANSPLANT | Facility: HOSPITAL | Age: 53
End: 2023-11-07

## 2023-12-06 NOTE — PROGRESS NOTES
"Transplant Recipient Adult Psychosocial Assessment Update (Last assessment completed on 8/18/2022)    SW completed assessment update with patient and pt's friend Kim Lynch in clinic.    Misael Owens  [address] [place] [address]     Physical Address:  [address] [place] [address]    Telephone Information:   Mobile [phone]   Home  [phone] (home)  Work  [phone] (work)  E-mail  [email or web address]    Sex: male  YOB: 1970  Age: 53 y.o.    Encounter Date: 10/31/2023  [place] Citizen: yes  Primary Language: English   Needed: no    Emergency Contact:  Name: Lamar Owens  Relationship: mother  Address: [place]  Phone Numbers: [phone] (home)    Family/Social Support:   Number of dependents/: pt reports 2 minor dependents: Tea 8 and Faustina 9, who are cared for by their mother.  Marital history: pt reports no marital history or current relationship   Other family dynamics: Pt resides with friend Kim and reports Kim is very supportive. Pt also reports having 3 adult children, who are involved in pt's life. Pt reports mother as alive and well. Pt reports having 1 sibling, who he has an "on and off" relationship.     Household Composition:  Name: Misael Owens  Age: 53  Relationship: patient  Does person drive? yes    Name: Kim Lynch  Age: 53  Relationship: friend  Does person drive? yes    Do you and your caregivers have access to reliable transportation? yes  PRIMARY CAREGIVER:Lamar Owens, pt's  mother, will be primary caregiver phone number [phone] (home).      provided in-depth information to patient and caregiver regarding pre- and post-transplant caregiver role.   strongly encourages patient and caregiver to have concrete plan regarding post-transplant care giving, including back-up caregiver(s) to ensure care giving needs are met as needed.    Patient states understanding all aspects of caregiver " role/commitment.    Patient verbalizes understanding of the education provided today and caregiver responsibilities.         remains available. Patient agree to contact  in a timely manner if concerns arise.      Able to take time off work without financial concerns: yes.     Additional Significant Others who will Assist with Transplant:  Name: Kim Lynch   Relationship: friend  Address: [place]  Phone Numbers: [phone] (mobile)  Does person drive? yes    Name: Gertrude Owens   Relationship: sister  Address: [facility] [place]  Phone Numbers: [phone] (mobile)  Does person drive? yes    Living Will: no  Healthcare Power of : no pt reports trusting mother and adult children with medical decisions as needed.   Advance Directives on file: <<no information> per medical record.  Verbally reviewed LW/HCPA information.   provided patient with copy of LW/HCPA documents and provided education on completion of forms.    Living Donors: No. Education and resource information given to patient.    Highest Education Level: High School (9-12) or GED  Reading Ability: 12th grade  Reports difficulty with: seeing small print. Pt utilizes reading glasses as needed.   Learns Best By:  A combination of verbal, written, and hands-on instruction.      Status: no  VA Benefits: no     Working for Income: No  If no, reason not working: Demands of Treatment  Patient is disabled.  Prior to disability, patient  was employed as a plant contractor and plans to return to work once health improves..     Spouse/Significant Other Employment: Pt's friend Kim works from home.    Disabled: yes: date disability began: November 2021, due to: ESRD.    Monthly Income:   Disability: $1227    Pt also reports having [employer] food card and receives $75 per month.    Able to afford all costs now and if transplanted, including medications: yes    Patient and Caregiver verbalizes understanding of  personal responsibilities related to transplant costs and the importance of having a financial plan to ensure that patients transplant costs are fully covered.       provided fundraising information/education. Patient verbalizes understanding.   remains available.    Insurance:  Payer/Plan Subscr  Sex Relation Sub. Ins. ID Effective Group Num   1. HU[employer] MANAGE* HARRISON KENT 1970 Male Self [id number]                                   [address] [address]     Primary Insurance (for UNOS reporting): Public Insurance - Medicare & Choice - [employer] Dual Complete Medicare  Secondary Insurance (for UNOS reporting): Public Insurance - Medicaid  Patient and Caregiver verbalizes clear understanding that patient may experience difficulty obtaining and/or be denied insurance coverage post-surgery. This includes and is not limited to disability insurance, life insurance, health insurance, burial insurance, long term care insurance, and other insurances.      Patient and Caregiver also reports understanding that future health concerns related to or unrelated to transplantation may not be covered by patient's insurance.  Resources and information provided and reviewed.     Patient and Caregiver provides verbal permission to release any necessary information to outside resources for patient care and discharge planning.  Resources and information provided are reviewed.      Dialysis Adherence: Patient reports high adherence with dialysis treatments since starting on 2020.  Pt reports receiving hemodialysis in-center and maintains adequate adherence to HD treatment regimen.  Pt reports utilizing multiple transportation services to attend treatment, including Uber/Lyft, public transit, and [employer] transportation service.  Dialysis adherence form completed and returned by patient's dialysis unit can be found under 'Media' section of EMR.  Compliance reported as  satisfactory.      Infusion Service: patient utilizing? no  Home Health: patient utilizing? no  DME: yes bpc and scale   Pulmonary/Cardiac Rehab: pt denies    ADLS:  Pt reports pt is independent with all ADLS. Pt reports he does not drive often due to joint pain. Pt utilizes Lyft transport.   Adherence: Pt/caregiver reports pt has exhibited good adherence to medication regimen, appointment schedule, and medical recommendations in the past.    Per History Section:  Past Medical History:   Diagnosis Date    CKD (chronic kidney disease), stage IV     due to hypertension    ESRD on dialysis     Hypertension     Secondary hyperparathyroidism     Vitamin D deficiency      Social History     Tobacco Use    Smoking status: Never    Smokeless tobacco: Never   Substance Use Topics    Alcohol use: No     Social History     Substance and Sexual Activity   Drug Use No     Social History     Substance and Sexual Activity   Sexual Activity Not on file       Per Today's Psychosocial:  Tobacco: none, patient denies any use.  Alcohol: none, patient denies any use.  Illicit Drugs/Non-prescribed Medications: none, patient denies any use.    Patient and Caregiver states clear understanding of the potential impact of substance use as it relates to transplant candidacy and is aware of possible random substance screening.  Substance abstinence/cessation counseling, education and resources provided and reviewed.     Arrests/DWI/Treatment/Rehab: patient denies    Psychiatric History:    Mental Health:  pt denies mental health concerns    Psychiatrist/Counselor: Pt denies currently or in the past and reports willingness to meet with psychiatry if required by transplant.   Medications:  Pt denies utilizing mental health medications currently or in the past  Suicide/Homicide Issues: Pt denies current or past suicidal/homicidal ideation.  Safety at home: Pt denies any home safety concerns.      Knowledge: Patient and Caregiver states having  clear understanding and realistic expectations regarding the potential risks and potential benefits of organ transplantation and organ donation and agrees to discuss with health care team members and support system members, as well as to utilize available resources and express questions and/or concerns in order to further facilitate the pt informed decision-making.  Resources and information provided and reviewed.    Patient and Caregiver is aware of [facility]'s affiliation and/or partnership with agencies in home health care, LTAC, SNF, [facility], and other hospitals and clinics.    Understanding: Patient reports having a clear understanding of the many lifetime commitments involved with being a transplant recipient, including costs, compliance, medications, lab work, procedures, appointments, concrete and financial planning, preparedness, timely and appropriate communication of concerns, abstinence (ETOH, tobacco, illicit non-prescribed drugs), adherence to all health care team recommendations, support system and caregiver involvement, appropriate and timely resource utilization and follow-through, mental health counseling as needed/recommended, and patient and caregiver responsibilities.  Social Service Handbook, resources and detailed educational information provided and reviewed.  Educational information provided.    Patient and Caregiver also reports current and expected compliance with health care regime and states having a clear understanding of the importance of compliance.      Patient and Caregiver reports a clear understanding that risks and benefits may be involved with organ transplantation and with organ donation.       Patient and Caregiver also reports clear understanding that psychosocial risk factors may affect patient, and include but are not limited to feelings of depression, generalized anxiety, anxiety regarding dependence on others, post traumatic stress disorder, feelings of guilt and other  emotional and/or mental concerns, and/or exacerbation of existing mental health concerns.  Detailed resources provided and discussed.      Patient and Caregiver agrees to access appropriate resources in a timely manner as needed and/or as recommended, and to communicate concerns appropriately.  Patient and Caregiver also reports a clear understanding of treatment options available.      Patient and Caregiver received education in a group setting.   reviewed education, provided additional information, and answered questions.    Feelings or Concerns: Pt expresses motivation to continue pursuing transplant and denies any transplant related concerns at this time.    Coping: Identify Patient & Caregiver Strategies to [place]:   1. Currently & Pre-transplant - spending time with children and friends    2. At the time of surgery - visits from friends/children   3. During post-Transplant & Recovery Period - prayerfully having kids visit him     Goals: Pt plans to return to work and has a good relationship with previous employer.     Interview Behavior: Patient presents as alert and oriented x 4, well groomed, recall good, concentration/judgement good, average intelligence, calm and pt was wearing sunglasses throughout the interview.       Transplant Social Work - Candidacy  Assessment/Plan:     Psychosocial Suitability: Based on psychosocial risk factors, patient presents as  low risk  candidate for kidney transplant at this time due to established caregiver plan, supportive family system, no reported history of substance abuse and/or mental health concerns, adequate insurance coverage and personal finances to cover transplant cost.    Recommendations/Additional Comments: ISREAL recommends that pt conduct fundraising to assist pt with pay for cost of medications, food, gas, and other transplant related needs. ISREAL recommends that pt remain aware of potential mental health concerns and contact the team if any concerns  arise. SW recommends that pt remain abstinent from tobacco, ETOH, and drug use. SW supports pt's continued dialysis adherence. SW remains available to answer any questions or concerns that arise as the pt moves through the transplant process.     Fabio Urbina

## 2024-06-02 ENCOUNTER — TELEPHONE (OUTPATIENT)
Dept: TRANSPLANT | Facility: CLINIC | Age: 54
End: 2024-06-02
Payer: MEDICARE

## 2024-06-02 NOTE — TELEPHONE ENCOUNTER
ON-CALL NOTE    UNOS# [id number]    Notified by Antonio Green, , that Misael Owens is eligible for kidney offer.    Pt unable to drive to [facility] [place] due to weather. Notified pt that he will not be considered for this offer due to unable to provide fresh sample. Pt verbalized understanding.

## 2024-06-24 ENCOUNTER — TELEPHONE (OUTPATIENT)
Dept: TRANSPLANT | Facility: CLINIC | Age: 54
End: 2024-06-24
Payer: MEDICARE

## 2024-06-25 NOTE — TELEPHONE ENCOUNTER
ON-CALL ORGAN OFFER    [facility]# [id number]    Notified by Antonio Curry, , of  donor kidney offer for Misael Owens, MRN: [id number].  Multiple attempts have been made to contact the patient. Messages left for patient to contact On-call coordinator ASAP.

## 2024-07-10 DIAGNOSIS — Z76.82 ORGAN TRANSPLANT CANDIDATE: ICD-10-CM

## 2024-07-10 DIAGNOSIS — Z12.5 SCREENING PSA (PROSTATE SPECIFIC ANTIGEN): Primary | ICD-10-CM

## 2024-07-10 DIAGNOSIS — Z87.898 HISTORY OF ELEVATED PSA: Primary | ICD-10-CM

## 2024-07-10 DIAGNOSIS — Z12.5 ENCOUNTER FOR SCREENING FOR MALIGNANT NEOPLASM OF PROSTATE: ICD-10-CM

## 2024-07-11 ENCOUNTER — TELEPHONE (OUTPATIENT)
Dept: TRANSPLANT | Facility: CLINIC | Age: 54
End: 2024-07-11
Payer: MEDICARE

## 2024-07-16 ENCOUNTER — TELEPHONE (OUTPATIENT)
Dept: TRANSPLANT | Facility: CLINIC | Age: 54
End: 2024-07-16
Payer: MEDICARE

## 2024-07-16 NOTE — LETTER
2024    Misael Owens  [address] [place] [address]        Dear Misael Owens:  MRN: [id number]  : 1970    You are scheduled on 10/10/2024 for follow up in the transplant clinic to update your records and evaluate your health status as you wait for a transplant.  It is very important that we ensure you are ready for your transplant.      Please make arrangements to be in our transplant clinic from 12:30 pm- 4 pm.  During this time you will watch an educational video and then be seen by our transplant , financial counselor, and advance practice provider.     If you have had a change in your medical history since your last visit, please call your transplant coordinator to ensure we have the medical records to review prior to your appointment.     Please bring the following with you to this appointment:  A Caregiver is REQUIRED  Bring ALL insurance information including medication card  Current list of medications  Copies of any outside medical records from the past year, such as: mammogram, pap smear, ultrasound, CAT scan, colonoscopy, cardiac angiogram or cardiac stress test    To reschedule your appointments please call [phone] or [phone] ([phone]. [phone]). Please ask for the .      Failure to cancel in advance or arrive on time could result in a $50 cancellation fee.  Your transplant candidacy could be affected by no showing these appointments.  We look forward to seeing you.    Sincerely,    [facility] [place]  [facility] [place] [address]  [phone]

## 2024-10-03 ENCOUNTER — TELEPHONE (OUTPATIENT)
Dept: TRANSPLANT | Facility: CLINIC | Age: 54
End: 2024-10-03
Payer: MEDICARE

## 2024-10-03 NOTE — TELEPHONE ENCOUNTER
Spoke to pt confirming rescheduled test and clinic visit on 12/12/2024. Appt reminders were mailed and pt is aware to bring care giver.    ----- Message from Jorge sent at 10/3/2024 11:14 AM CDT -----  Type:  Patient Returning Call    Who Called:PT  Who Left Message for Patient:  Does the patient know what this is regarding?:APPT RESCHEDULE   Would the patient rather a call back or a response via [facility]? CALL  Best Call Back Number: [phone]  Additional Information: Pt states he would like a call back to get appts on 10/10 rescheduled. Thank you

## 2024-10-07 DIAGNOSIS — N18.6 END STAGE RENAL DISEASE: Primary | ICD-10-CM

## 2024-10-07 DIAGNOSIS — Z76.82 ORGAN TRANSPLANT CANDIDATE: ICD-10-CM

## 2024-10-30 ENCOUNTER — TELEPHONE (OUTPATIENT)
Dept: TRANSPLANT | Facility: CLINIC | Age: 54
End: 2024-10-30
Payer: MEDICARE

## 2024-11-08 DIAGNOSIS — Z76.82 PRE-KIDNEY TRANSPLANT, LISTED: Primary | ICD-10-CM

## 2024-12-10 ENCOUNTER — TELEPHONE (OUTPATIENT)
Dept: CARDIOLOGY | Facility: CLINIC | Age: 54
End: 2024-12-10
Payer: MEDICARE

## 2024-12-12 ENCOUNTER — HOSPITAL ENCOUNTER (OUTPATIENT)
Dept: CARDIOLOGY | Facility: HOSPITAL | Age: 54
Discharge: HOME | End: 2024-12-12
Attending: NURSE PRACTITIONER
Payer: MEDICARE

## 2024-12-12 ENCOUNTER — HOSPITAL ENCOUNTER (OUTPATIENT)
Dept: RADIOLOGY | Facility: HOSPITAL | Age: 54
Discharge: HOME | End: 2024-12-12
Attending: NURSE PRACTITIONER
Payer: MEDICARE

## 2024-12-12 ENCOUNTER — OFFICE VISIT (OUTPATIENT)
Dept: TRANSPLANT | Facility: CLINIC | Age: 54
End: 2024-12-12
Payer: MEDICARE

## 2024-12-12 VITALS
RESPIRATION RATE: 18 BRPM | SYSTOLIC BLOOD PRESSURE: 152 MMHG | OXYGEN SATURATION: 97 % | TEMPERATURE: 98 F | DIASTOLIC BLOOD PRESSURE: 82 MMHG | HEIGHT: 71 IN | WEIGHT: 192.69 LBS | BODY MASS INDEX: 26.98 KG/M2 | HEART RATE: 93 BPM

## 2024-12-12 VITALS
HEIGHT: 71 IN | SYSTOLIC BLOOD PRESSURE: 139 MMHG | HEART RATE: 85 BPM | WEIGHT: 206 LBS | BODY MASS INDEX: 28.84 KG/M2 | DIASTOLIC BLOOD PRESSURE: 79 MMHG

## 2024-12-12 VITALS
BODY MASS INDEX: 28.91 KG/M2 | SYSTOLIC BLOOD PRESSURE: 132 MMHG | DIASTOLIC BLOOD PRESSURE: 82 MMHG | HEIGHT: 71 IN | HEART RATE: 84 BPM | WEIGHT: 206.5 LBS

## 2024-12-12 DIAGNOSIS — Z76.82 ORGAN TRANSPLANT CANDIDATE: ICD-10-CM

## 2024-12-12 DIAGNOSIS — N18.6 ESRD ON HEMODIALYSIS: ICD-10-CM

## 2024-12-12 DIAGNOSIS — Z99.2 ESRD ON HEMODIALYSIS: ICD-10-CM

## 2024-12-12 DIAGNOSIS — N25.81 SECONDARY HYPERPARATHYROIDISM: ICD-10-CM

## 2024-12-12 DIAGNOSIS — Z76.82 PATIENT ON WAITING LIST FOR KIDNEY TRANSPLANT: Primary | ICD-10-CM

## 2024-12-12 DIAGNOSIS — I10 PRIMARY HYPERTENSION: ICD-10-CM

## 2024-12-12 LAB
ASCENDING AORTA: 3.28 CM
AV AREA BY CONTINUOUS VTI: 3.7 CM2
AV INDEX (PROSTH): 0.78
AV LVOT MEAN GRADIENT: 3 MMHG
AV LVOT PEAK GRADIENT: 6 MMHG
AV MEAN GRADIENT: 4.5 MMHG
AV PEAK GRADIENT: 7.8 MMHG
AV VALVE AREA BY VELOCITY RATIO: 4.6 CM²
AV VALVE AREA: 3.8 CM2
AV VELOCITY RATIO: 0.93
BSA FOR ECHO PROCEDURE: 2.17 M2
CV ECHO LV RWT: 0.25 CM
CV STRESS BASE HR: 85 BPM
DIASTOLIC BLOOD PRESSURE: 79 MMHG
DOP CALC AO PEAK VEL: 1.4 M/S
DOP CALC AO VTI: 28.6 CM
DOP CALC LVOT AREA: 4.9 CM2
DOP CALC LVOT DIAMETER: 2.5 CM
DOP CALC LVOT PEAK VEL: 1.3 M/S
DOP CALC LVOT STROKE VOLUME: 109.4 CM3
DOP CALCLVOT PEAK VEL VTI: 22.3 CM
E WAVE DECELERATION TIME: 160.34 MS
E/A RATIO: 1.12
E/E' RATIO: 10.33 M/S
ECHO EF ESTIMATED: 57 %
ECHO LV POSTERIOR WALL: 0.7 CM (ref 0.6–1.1)
EJECTION FRACTION- HIGH: 59 %
END DIASTOLIC INDEX-HIGH: 155 ML/M2
END DIASTOLIC INDEX-LOW: 91 ML/M2
END SYSTOLIC INDEX-HIGH: 78 ML/M2
END SYSTOLIC INDEX-LOW: 40 ML/M2
FRACTIONAL SHORTENING: 30.9 % (ref 28–44)
INTERVENTRICULAR SEPTUM: 0.6 CM (ref 0.6–1.1)
IVRT: 60.89 MS
LA MAJOR: 5.72 CM
LA MINOR: 5.83 CM
LA WIDTH: 4.75 CM
LEFT ATRIUM SIZE: 4.97 CM
LEFT ATRIUM VOLUME INDEX MOD: 52.4 ML/M2
LEFT ATRIUM VOLUME INDEX: 54.1 ML/M2
LEFT ATRIUM VOLUME MOD: 112.15 ML
LEFT ATRIUM VOLUME: 115.87 CM3
LEFT INTERNAL DIMENSION IN SYSTOLE: 3.8 CM (ref 2.1–4)
LEFT VENTRICLE DIASTOLIC VOLUME INDEX: 67.38 ML/M2
LEFT VENTRICLE DIASTOLIC VOLUME: 144.19 ML
LEFT VENTRICLE MASS INDEX: 57.8 G/M2
LEFT VENTRICLE SYSTOLIC VOLUME INDEX: 28.8 ML/M2
LEFT VENTRICLE SYSTOLIC VOLUME: 61.66 ML
LEFT VENTRICULAR INTERNAL DIMENSION IN DIASTOLE: 5.5 CM (ref 3.5–6)
LEFT VENTRICULAR MASS: 123.8 G
LV LATERAL E/E' RATIO: 11.63
LV SEPTAL E/E' RATIO: 9.3
MV A" WAVE DURATION": 97.05 MS
MV PEAK A VEL: 0.83 M/S
MV PEAK E VEL: 0.93 M/S
NUC REST DIASTOLIC VOLUME INDEX: 139
NUC REST EJECTION FRACTION: 65
NUC REST SYSTOLIC VOLUME INDEX: 48
NUC STRESS DIASTOLIC VOLUME INDEX: 139
NUC STRESS EJECTION FRACTION: 68 %
NUC STRESS SYSTOLIC VOLUME INDEX: 44
OHS CV CPX 1 MINUTE RECOVERY HEART RATE: 141 BPM
OHS CV CPX 85 PERCENT MAX PREDICTED HEART RATE MALE: 141
OHS CV CPX MAX PREDICTED HEART RATE: 166
OHS CV CPX PATIENT IS FEMALE: 0
OHS CV CPX PATIENT IS MALE: 1
OHS CV CPX PEAK DIASTOLIC BLOOD PRESSURE: 79 MMHG
OHS CV CPX PEAK HEAR RATE: 110 BPM
OHS CV CPX PEAK RATE PRESSURE PRODUCT: NORMAL
OHS CV CPX PEAK SYSTOLIC BLOOD PRESSURE: 139 MMHG
OHS CV CPX PERCENT MAX PREDICTED HEART RATE ACHIEVED: 66
OHS CV CPX RATE PRESSURE PRODUCT PRESENTING: NORMAL
OHS CV INITIAL DOSE: 7 MCG/KG/MIN
OHS CV PEAK DOSE: 21.1 MCG/KG/MIN
OHS CV RV/LV RATIO: 0.69 CM
PISA TR MAX VEL: 2.11 M/S
PULM VEIN A" WAVE DURATION": 97.05 MS
PULM VEIN S/D RATIO: 1.26
PULMONIC VEIN PEAK A VELOCITY: 0.3 M/S
PV PEAK D VEL: 0.46 M/S
PV PEAK S VEL: 0.58 M/S
RA MAJOR: 5.29 CM
RA PRESSURE ESTIMATED: 3 MMHG
RA WIDTH: 4.21 CM
RETIRED EF AND QEF - SEE NOTES: 47 %
RIGHT VENTRICLE DIASTOLIC BASEL DIMENSION: 3.8 CM
RV TB RVSP: 5 MMHG
RV TISSUE DOPPLER FREE WALL SYSTOLIC VELOCITY 1 (APICAL 4 CHAMBER VIEW): 22.75 CM/S
SINUS: 3.78 CM
STJ: 3.26 CM
SYSTOLIC BLOOD PRESSURE: 139 MMHG
TDI LATERAL: 0.08 M/S
TDI SEPTAL: 0.1 M/S
TDI: 0.09 M/S
TR MAX PG: 18 MMHG
TRICUSPID ANNULAR PLANE SYSTOLIC EXCURSION: 2.8 CM
TV PEAK GRADIENT: 18 MMHG
TV REST PULMONARY ARTERY PRESSURE: 21 MMHG
Z-SCORE OF LEFT VENTRICULAR DIMENSION IN END DIASTOLE: -2.24
Z-SCORE OF LEFT VENTRICULAR DIMENSION IN END SYSTOLE: -0.78

## 2024-12-12 PROCEDURE — 72170 X-RAY EXAM OF PELVIS: CPT | Mod: 26,TXP,, | Performed by: RADIOLOGY

## 2024-12-12 PROCEDURE — 72170 X-RAY EXAM OF PELVIS: CPT | Mod: TC,HCNC,TXP

## 2024-12-12 PROCEDURE — 93016 CV STRESS TEST SUPVJ ONLY: CPT | Mod: TXP,,, | Performed by: INTERNAL MEDICINE

## 2024-12-12 PROCEDURE — 71046 X-RAY EXAM CHEST 2 VIEWS: CPT | Mod: TC,HCNC,TXP

## 2024-12-12 PROCEDURE — 93306 TTE W/DOPPLER COMPLETE: CPT | Mod: 26,TXP,, | Performed by: INTERNAL MEDICINE

## 2024-12-12 PROCEDURE — 93978 VASCULAR STUDY: CPT | Mod: TC,HCNC,TXP

## 2024-12-12 PROCEDURE — 76770 US EXAM ABDO BACK WALL COMP: CPT | Mod: 26,TXP,, | Performed by: STUDENT IN AN ORGANIZED HEALTH CARE EDUCATION/TRAINING PROGRAM

## 2024-12-12 PROCEDURE — 93306 TTE W/DOPPLER COMPLETE: CPT | Mod: HCNC,TXP

## 2024-12-12 PROCEDURE — 99215 OFFICE O/P EST HI 40 MIN: CPT | Mod: S$GLB,TXP,, | Performed by: NURSE PRACTITIONER

## 2024-12-12 PROCEDURE — 3079F DIAST BP 80-89 MM HG: CPT | Mod: CPTII,S$GLB,TXP, | Performed by: NURSE PRACTITIONER

## 2024-12-12 PROCEDURE — 78452 HT MUSCLE IMAGE SPECT MULT: CPT | Mod: HCNC,TXP

## 2024-12-12 PROCEDURE — 93978 VASCULAR STUDY: CPT | Mod: 26,TXP,, | Performed by: STUDENT IN AN ORGANIZED HEALTH CARE EDUCATION/TRAINING PROGRAM

## 2024-12-12 PROCEDURE — 78452 HT MUSCLE IMAGE SPECT MULT: CPT | Mod: 26,TXP,, | Performed by: INTERNAL MEDICINE

## 2024-12-12 PROCEDURE — 1160F RVW MEDS BY RX/DR IN RCRD: CPT | Mod: CPTII,S$GLB,TXP, | Performed by: NURSE PRACTITIONER

## 2024-12-12 PROCEDURE — 99999 PR PBB SHADOW E&M-EST. PATIENT-LVL V: CPT | Mod: PBBFAC,HCNC,TXP, | Performed by: NURSE PRACTITIONER

## 2024-12-12 PROCEDURE — 3008F BODY MASS INDEX DOCD: CPT | Mod: CPTII,S$GLB,TXP, | Performed by: NURSE PRACTITIONER

## 2024-12-12 PROCEDURE — 63600175 PHARM REV CODE 636 W HCPCS: Mod: HCNC,TXP | Performed by: NURSE PRACTITIONER

## 2024-12-12 PROCEDURE — 93018 CV STRESS TEST I&R ONLY: CPT | Mod: TXP,,, | Performed by: INTERNAL MEDICINE

## 2024-12-12 PROCEDURE — 3077F SYST BP >= 140 MM HG: CPT | Mod: CPTII,S$GLB,TXP, | Performed by: NURSE PRACTITIONER

## 2024-12-12 PROCEDURE — 76770 US EXAM ABDO BACK WALL COMP: CPT | Mod: TC,HCNC,TXP

## 2024-12-12 PROCEDURE — 1159F MED LIST DOCD IN RCRD: CPT | Mod: CPTII,S$GLB,TXP, | Performed by: NURSE PRACTITIONER

## 2024-12-12 PROCEDURE — 71046 X-RAY EXAM CHEST 2 VIEWS: CPT | Mod: 26,TXP,, | Performed by: RADIOLOGY

## 2024-12-12 PROCEDURE — A9502 TC99M TETROFOSMIN: HCPCS | Mod: HCNC,TXP | Performed by: NURSE PRACTITIONER

## 2024-12-12 RX ORDER — REGADENOSON 0.08 MG/ML
0.4 INJECTION, SOLUTION INTRAVENOUS
Status: COMPLETED | OUTPATIENT
Start: 2024-12-12 | End: 2024-12-12

## 2024-12-12 RX ADMIN — REGADENOSON 0.4 MG: 0.08 INJECTION, SOLUTION INTRAVENOUS at 09:12

## 2024-12-12 RX ADMIN — TETROFOSMIN 21.1 MILLICURIE: 1.38 INJECTION, POWDER, LYOPHILIZED, FOR SOLUTION INTRAVENOUS at 09:12

## 2024-12-12 RX ADMIN — TETROFOSMIN 7 MILLICURIE: 1.38 INJECTION, POWDER, LYOPHILIZED, FOR SOLUTION INTRAVENOUS at 09:12

## 2024-12-12 NOTE — LETTER
December 16, 2024        Matthew Moreno  [address]  [facility] [address]  Phone: [phone]  Fax: [phone]             [facility] 1st Fl  [address]  [facility] [address]  Phone: [phone]   Patient: Misael Owens   MR Number: [id number]   YOB: 1970   Date of Visit: 12/12/2024       Dear Dr. Matthew Moreno    Thank you for referring Misael Owens to me for evaluation. Attached you will find relevant portions of my assessment and plan of care.    If you have questions, please do not hesitate to call me. I look forward to following Misael Owens along with you.    Sincerely,    Laura Platt NP    Enclosure    If you would like to receive this communication electronically, please contact [email or web address] or [phone] to request MOGL Link access.    [employer] Link is a tool which provides read-only access to select patient information with whom you have a relationship. Its easy to use and provides real time access to review your patients record including encounter summaries, notes, results, and demographic information.    If you feel you have received this communication in error or would no longer like to receive these types of communications, please e-mail [email or web address]

## 2024-12-12 NOTE — PROGRESS NOTES
Kidney Transplant Recipient Reevalulation    Referring Physician: Matthew Moreno  Current Nephrologist: Matthew Moreno  Waitlist Status: active  Dialysis Start Date: 10/30/2024    Subjective:     CC:  Annual reassessment of kidney transplant candidacy.    HPI:  Mr. Owens is a 54 y.o. year old Black or  male with ESRD secondary to HTN.  He has been on the wait list for a kidney transplant at [facility] since 4/1/2020. Patient is currently on hemodialysis started on 4/1/2020. Patient is dialyzing on MWF schedule.  Patient reports that he is tolerating dialysis well.. He has a LUE AV fistula. Patient denies any recent hospitalizations or ED visits.    Excited that he has been getting organ offer calls. He is here today with his sister. He remains active without functional impairments. Frequently walks around his neighborhood for exercise.     CXR 12/12/2024: No acute abnormality.   PXR 12/12/2024: No evidence indicating an unusually advanced degree of iliac arterial calcification in the pelvis is observed.   Renal US 12/12/2024: Chronic medical renal disease. No hydronephrosis.   Iliacs 12/12/2024: triphasic flow bilaterally   Echo 12/12/2024: EF 60-65%, PA 21  Stress 12/12/2024: pending  Colonoscopy 10/2020: no specimens, repeat in 10 years     Current Outpatient Medications   Medication Sig Dispense Refill    acetaminophen (TYLENOL) 325 MG tablet Take 325 mg by mouth every 6 (six) hours as needed for Pain. Takes 1-2 tablets prn      calcitRIOL (ROCALTROL) 0.5 MCG Cap Take 1 capsule (0.5 mcg total) by mouth once daily. 30 capsule 3    ergocalciferol (ERGOCALCIFEROL) 50,000 unit Cap Take 1 capsule (50,000 Units total) by mouth every 7 days. 12 capsule 3    ergocalciferol (ERGOCALCIFEROL) 50,000 unit Cap Take 1 capsule (50,000 Units total) by mouth every 7 days. 12 capsule 3    furosemide (LASIX) 40 MG tablet Take 1 tablet (40 mg total) by mouth once daily. 90 tablet 3    furosemide (LASIX) 40 MG tablet  Take 1 tablet (40 mg total) by mouth once daily. 90 tablet 3    labetaloL (NORMODYNE) 200 MG tablet Take 1 tablet (200 mg total) by mouth every 12 (twelve) hours. 60 tablet 4    labetaloL (NORMODYNE) 200 MG tablet Take 1 tablet (200 mg total) by mouth every 12 (twelve) hours. 60 tablet 4    RENVELA 800 mg Tab Take 1 tablet (800 mg total) by mouth 4 (four) times daily. 120 tablet 3     No current facility-administered medications for this visit.     Facility-Administered Medications Ordered in Other Visits   Medication Dose Route Frequency Provider Last Rate Last Admin    ceFAZolin injection 2 g  2 g Intravenous On Call Procedure Cherry Ocampo MD        lidocaine (PF) 10 mg/ml (1%) injection 10 mg  1 mL Intradermal Once Jay Sherman MD        mupirocin 2 % ointment   Nasal On Call Procedure Cherry Ocampo MD   Given at 03/02/20 0854    ondansetron injection 4 mg  4 mg Intravenous Q12H PRN Jay Sherman MD        sodium chloride 0.9% flush 10 mL  10 mL Intravenous PRN Cherry Ocampo MD           Past Medical History:   Diagnosis Date    CKD (chronic kidney disease), stage IV     due to hypertension    ESRD on dialysis     Hypertension     Secondary hyperparathyroidism     Vitamin D deficiency      Review of Systems   Constitutional:  Negative for activity change and fever.   Eyes:  Negative for visual disturbance.   Respiratory:  Negative for cough and shortness of breath.    Cardiovascular:  Negative for chest pain and leg swelling.   Gastrointestinal:  Negative for abdominal pain, constipation, diarrhea and nausea.   Genitourinary:  Positive for decreased urine volume. Negative for difficulty urinating, frequency and hematuria.        Still urinating    Musculoskeletal:  Negative for arthralgias and myalgias.   Skin:  Negative for wound.   Neurological:  Negative for weakness.   Psychiatric/Behavioral:  Negative for sleep disturbance.      Objective:   body mass index is 26.87 kg/m².  BP (!)  "152/82 (BP Location: Right arm, Patient Position: Sitting)   Pulse 93   Temp 97.7 °F (36.5 °C) (Temporal)   Resp 18   Ht 5' 11" (1.803 m)   Wt 87.4 kg (192 lb 10.9 oz)   SpO2 97%   BMI 26.87 kg/m²     Physical Exam  Vitals and nursing note reviewed.   Constitutional:       Appearance: Normal appearance.   Cardiovascular:      Rate and Rhythm: Normal rate and regular rhythm.      Heart sounds: Normal heart sounds.   Pulmonary:      Effort: Pulmonary effort is normal.      Breath sounds: Normal breath sounds.   Abdominal:      General: There is no distension.   Musculoskeletal:         General: Normal range of motion.      Comments: LUE AV fistula + thrill    Skin:     General: Skin is warm and dry.   Neurological:      Mental Status: He is alert.         Labs:  PSA, Screen (ng/mL)   Date Value   12/12/2024 1.8       Lab Results   Component Value Date    WBC 6.43 01/15/2021    HGB 11 (L) 12/02/2024    HCT 38.2 (L) 01/15/2021     01/15/2021    K 3.8 01/15/2021    CL 95 01/15/2021    CO2 31 (H) 01/15/2021    BUN 55 (H) 01/15/2021    CREATININE 8.5 (H) 01/15/2021    CALCIUM 9.1 01/15/2021    PHOS 4.7 (H) 05/06/2020    MG 1.6 03/30/2020    ALBUMIN 4.4 01/15/2021    AST 26 01/15/2021    ALT 26 01/15/2021    UTPCR 0.61 (H) 05/06/2020     (H) 11/11/2024       Lab Results   Component Value Date    BILIRUBINUA Negative 05/06/2020    PROTEINUA 2+ (A) 05/06/2020    NITRITE n 06/25/2021    RBCUA 0 05/06/2020    WBCUA 1 05/06/2020     Lab Results   Component Value Date    CPRA 24 07/22/2024    XG5GJSP A1 07/22/2024    CIABCLM WEAK, B67 07/22/2024    CIIAB Negative 05/15/2023    ABCMT WEAK DQA1*05:05, DQ9 07/22/2024       Labs were reviewed with the patient.    Pre-transplant Workup:   Reviewed with the patient.    Assessment:     1. Patient on waiting list for kidney transplant    2. ESRD on hemodialysis    3. Primary hypertension    4. Secondary hyperparathyroidism    5. BMI 26.0-26.9,adult      Plan: "     Transplant Candidacy:   Mr. Owens is a suitable kidney transplant candidate.  Meets center eligibility for accepting HCV+ donor offer - Yes.  Patient educated on HCV+ donors. Misael is willing  to accept HCV+ donor offer -  Yes   Patient is a candidate for KDPI > 85 kidney donor offer - Yes.  He remains in overall stable health, and will remain active on the transplant list.    Patient advised that it is recommended that all transplant candidates, and their close contacts and household members receive Covid vaccination.    Laura Platt NP       Follow-up:   In addition to the tests noted in the plan, Mr. Owens will continue to have reevaluation as per the standing pre-kidney transplant protocol:  Monthly blood for PRA  Annual return to clinic, except HIV positive, > 65 years of age, or pancreas transplant candidates who will be scheduled to see transplant every 6 months while in pre-transplant phase  Annual re-testing: CXR, EKG, yearly mammograms for women over 40 and PSA for males over 40, cardiology follow-up as recommended by initial cardiology pre-transplant evaluation  Renal ultrasound every 2 years  Baseline colonoscopy after age 50 and repeated as recommended    UNOS Patient Status  Functional Status: 60% - Requires occasional assistance but is able to care for needs  Physical Capacity: No Limitations

## 2024-12-12 NOTE — PROGRESS NOTES
AA YEARLY PATIENT EDUCATION NOTE    Mr. Misael Owens was seen in pre-kidney transplant clinic for yearly (or six months) evaluation for kidney, kidney/pancreas or pancreas only transplant.  The patient attended a web based education session that discussed/reviewed the following aspects of transplantation: UNOS waitlist management/multiple listings, types of organs offered (KDPI < 85%, KDPI > 85%, PHS increased risk, DCD, HCV+), financial aspects, surgical procedures, dietary instruction pre- and post-transplant, health maintenance pre- and post-transplant, post-transplant hospitalization and outpatient follow-up, potential to participate in a research protocol, and medication management and side effects. A question and answer session was provided after the presentation.    The patient was seen by all members of the multi-disciplinary team to include: Nephrologist/PA, , , Pharmacist and Dietician (if applicable).    The Patient was educated on OPTN policy change regarding race based eGFR. For Black or  Americans, this eGFR could have shown that their kidneys were working better than they were.    Because of this change, we are looking at everyones record and assessing waiting time for people who are eligible. We will be reviewing your medical records and will notify you if you are eligible. We also encouraged patient to provide span 20 labs that are not in our electronic medical records.    The patient reviewed and signed all consents for evaluation which were witnessed and sent to scanning into the [facility] chart.    The patient was given an education book and plan for further evaluation based on his individual assessment.      The patient was encouraged to call with any questions or concerns.

## 2024-12-12 NOTE — PROGRESS NOTES
Transplant Recipient Adult Psychosocial Assessment Update (Last assessment completed on 10/31/2023)    SW completed assessment update with patient and sister Gertrude Owens  [address]  [place], [address]    Telephone Information:   Mobile [phone]   Home  [phone] (home)  Work  [phone] (work)  E-mail  [email or web address]    Sex: male  YOB: 1970  Age: 54 y.o.    Encounter Date: 12/12/2024  [place] Citizen: yes  Primary Language: English   Needed: no    Emergency Contact:  Name: Gertrude Owens  Relationship: sister  Address: [address] [place]  Phone Numbers: [phone] (home)    Family/Social Support:   Number of dependents/: pt reports 2 minor dependents: Tea 9 and Faustina 10, who are cared for by their mother. Pt has limited contact with minors.  Marital history: pt reports no marital history or current relationship   Other family dynamics: Pt resides with sister Gertrude who is very supportive. Pt also reports having 3 adult children, who are involved in pt's life. Pt reports mother as alive and well.   Household Composition:  Name: Misael Owens  Age: 54  Relationship: patient  Does person drive? yes    Name: Gertrude Owens  Age: 52  Relationship: sister  Does person drive? yes    Do you and your caregivers have access to reliable transportation? yes  PRIMARY CAREGIVER: Gertrude Owens, pt's  mother, will be primary caregiver phone number [phone] (mobile).      provided in-depth information to patient and caregiver regarding pre- and post-transplant caregiver role.  strongly encourages patient and caregiver to have concrete plan regarding post-transplant care giving, including back-up caregiver(s) to ensure care giving needs are met as needed.    Patient and Caregiver states understanding all aspects of caregiver role/commitment and is able/willing/committed to being caregiver to the fullest extent necessary.    Patient and  Caregiver verbalizes understanding of the education provided today and caregiver responsibilities.         remains available. Patient and Caregiver agree to contact  in a timely manner if concerns arise.      Able to take time off work without financial concerns: yes.     Additional Significant Others who will Assist with Transplant:  Pt reports plans to speak with adult children regarding secondary needs.      Living Will: no  Healthcare Power of : no pt reports trusting sister Gertrude and adult children with medical decisions as needed.   Advance Directives on file: <<no information> per medical record.  Verbally reviewed LW/HCPA information.   provided patient with copy of LW/HCPA documents and provided education on completion of forms.    Living Donors: No. Education and resource information given to patient.    Highest Education Level: High School (9-12) or GED  Reading Ability: 12th grade  Reports difficulty with: seeing small print. Pt utilizes reading glasses as needed.   Learns Best By:  A combination of verbal, written, and hands-on instruction.      Status: no  VA Benefits: no     Working for Income: No  If no, reason not working: Demands of Treatment  Patient is disabled.  Prior to disability, patient  was employed as a plant contractor and plans to return to work once health improves..     Spouse/Significant Other Employment: Pt's friend Kim works from home.    Disabled: yes: date disability began: November 2021, due to: ESRD.    Monthly Income:  SS Disability: $1227    Pt also reports having [employer] food card and receives $75 per month.    Able to afford all costs now and if transplanted, including medications: yes    Patient and Caregiver verbalizes understanding of personal responsibilities related to transplant costs and the importance of having a financial plan to ensure that patients transplant costs are fully covered.        provided fundraising information/education. Patient verbalizes understanding.   remains available.    Insurance:  Payer/Plan Subscr  Sex Relation Sub. Ins. ID Effective Group Num   1. HU[employer] MANAGE* HARRISON KENT 1970 Male Self [id number]                                   [address] [address]     Primary Insurance (for UNOS reporting): Public Insurance - Medicare & Choice - Humana Dual Complete Medicare  Secondary Insurance (for UNOS reporting): Public Insurance - Medicaid currently transferring to Medicaid [place]  Patient and Caregiver verbalizes clear understanding that patient may experience difficulty obtaining and/or be denied insurance coverage post-surgery. This includes and is not limited to disability insurance, life insurance, health insurance, burial insurance, long term care insurance, and other insurances.      Patient and Caregiver also reports understanding that future health concerns related to or unrelated to transplantation may not be covered by patient's insurance.  Resources and information provided and reviewed.     Patient and Caregiver provides verbal permission to release any necessary information to outside resources for patient care and discharge planning.  Resources and information provided are reviewed.      Dialysis Adherence: Patient reports high adherence with dialysis treatments since starting on 2020.  Pt reports receiving hemodialysis in-center and maintains adequate adherence to HD treatment regimen. Dialysis adherence form completed and returned by patient's dialysis unit can be found under 'Media' section of EMR.  Compliance reported as satisfactory previously.      Infusion Service: patient utilizing? no  Home Health: patient utilizing? no  DME: yes bpc and scale   Pulmonary/Cardiac Rehab: pt denies    ADLS:  Pt reports pt is independent with all ADLS. Pt reports he does not drive often due to joint pain. Pt utilizes sister for transport.    Adherence: Pt/caregiver reports pt has exhibited good adherence to medication regimen, appointment schedule, and medical recommendations in the past.    Per History Section:  Past Medical History:   Diagnosis Date    CKD (chronic kidney disease), stage IV     due to hypertension    ESRD on dialysis     Hypertension     Secondary hyperparathyroidism     Vitamin D deficiency      Social History     Tobacco Use    Smoking status: Never    Smokeless tobacco: Never   Substance Use Topics    Alcohol use: No     Social History     Substance and Sexual Activity   Drug Use No     Social History     Substance and Sexual Activity   Sexual Activity Not on file       Per Today's Psychosocial:  Tobacco: none, patient denies any use.  Alcohol: none, patient denies any use.  Illicit Drugs/Non-prescribed Medications: none, patient denies any use.    Patient and Caregiver states clear understanding of the potential impact of substance use as it relates to transplant candidacy and is aware of possible random substance screening.  Substance abstinence/cessation counseling, education and resources provided and reviewed.     Arrests/DWI/Treatment/Rehab: patient denies    Psychiatric History:    Mental Health:  pt denies mental health concerns    Psychiatrist/Counselor: Pt denies currently or in the past and reports willingness to meet with psychiatry if required by transplant.   Medications:  Pt denies utilizing mental health medications currently or in the past  Suicide/Homicide Issues: Pt denies current or past suicidal/homicidal ideation.  Safety at home: Pt denies any home safety concerns.      Knowledge: Patient and Caregiver states having clear understanding and realistic expectations regarding the potential risks and potential benefits of organ transplantation and organ donation and agrees to discuss with health care team members and support system members, as well as to utilize available resources and express questions and/or  concerns in order to further facilitate the pt informed decision-making.  Resources and information provided and reviewed.    Patient and Caregiver is aware of [facility]'s affiliation and/or partnership with agencies in home health care, LTAC, SNF, [facility], and other hospitals and clinics.    Understanding: Patient reports having a clear understanding of the many lifetime commitments involved with being a transplant recipient, including costs, compliance, medications, lab work, procedures, appointments, concrete and financial planning, preparedness, timely and appropriate communication of concerns, abstinence (ETOH, tobacco, illicit non-prescribed drugs), adherence to all health care team recommendations, support system and caregiver involvement, appropriate and timely resource utilization and follow-through, mental health counseling as needed/recommended, and patient and caregiver responsibilities.  Social Service Handbook, resources and detailed educational information provided and reviewed.  Educational information provided.    Patient and Caregiver also reports current and expected compliance with health care regime and states having a clear understanding of the importance of compliance.      Patient and Caregiver reports a clear understanding that risks and benefits may be involved with organ transplantation and with organ donation.       Patient and Caregiver also reports clear understanding that psychosocial risk factors may affect patient, and include but are not limited to feelings of depression, generalized anxiety, anxiety regarding dependence on others, post traumatic stress disorder, feelings of guilt and other emotional and/or mental concerns, and/or exacerbation of existing mental health concerns.  Detailed resources provided and discussed.      Patient and Caregiver agrees to access appropriate resources in a timely manner as needed and/or as recommended, and to communicate concerns appropriately.  Patient  and Caregiver also reports a clear understanding of treatment options available.      Patient and Caregiver received education in a group setting.   reviewed education, provided additional information, and answered questions.    Feelings or Concerns: Pt expresses motivation to continue pursuing transplant and denies any transplant related concerns at this time.    Coping: Identify Patient & Caregiver Strategies to [place]:   1. Currently & Pre-transplant - spending time with children and friends    2. At the time of surgery - visits from friends/children   3. During post-Transplant & Recovery Period - prayerfully having kids visit him     Goals: Pt plans to return to work and has a good relationship with previous employer.     Interview Behavior: Patient and Caregiver presents as alert and oriented x 4, pleasant, well groomed, recall good, concentration/judgement good, average intelligence, calm, communicative, cooperative, asking and answering questions appropriately, and pt was wearing sunglasses throughout the interview.       Transplant Social Work - Candidacy  Assessment/Plan:     Psychosocial Suitability: Based on psychosocial risk factors, patient presents as  low risk  candidate for kidney transplant at this time due to established caregiver plan, supportive family system, no reported history of substance abuse and/or mental health concerns, adequate insurance coverage and personal finances to cover transplant cost.    Recommendations/Additional Comments: SW recommends that pt conduct fundraising to assist pt with pay for cost of medications, food, gas, and other transplant related needs. SW recommends that pt remain aware of potential mental health concerns and contact the team if any concerns arise. SW recommends that pt remain abstinent from tobacco, ETOH, and drug use. SW supports pt's continued dialysis adherence. SW remains available to answer any questions or concerns that arise as the pt  moves through the transplant process.     Hemanth Portillo, MSW, LMSW

## 2024-12-16 ENCOUNTER — TELEPHONE (OUTPATIENT)
Dept: TRANSPLANT | Facility: CLINIC | Age: 54
End: 2024-12-16
Payer: MEDICARE

## 2024-12-16 NOTE — TELEPHONE ENCOUNTER
MA notes per Adherence form     FOR THE PAST THREE MONTHS:    0-AMA's  1-No-shows 12/9/24  inclement weather    No concerns with care giving, transportation, or mental health    Scanned in pt's media    Fernanda Clemons  Abdominal Transplant MA

## 2025-01-19 ENCOUNTER — TELEPHONE (OUTPATIENT)
Dept: TRANSPLANT | Facility: CLINIC | Age: 55
End: 2025-01-19
Payer: MEDICARE

## 2025-01-19 NOTE — TELEPHONE ENCOUNTER
ON-CALL NOTE    UNOS# [id number]      1/18/2025 21:40  Notified by Epi Pollock, , that Misael Owens is eligible for kidney offer. An attempt to call patient. No answer. Left VM.     23:02 Patient called back.   Protocol script read to patient regarding PHS risk and HCV+ donor offer . Patient verbalized understanding, all questions answered, patient declines kidney  organ offer.   Epi Pollock was notified of patient's decision.

## 2025-02-12 PROCEDURE — 86832 HLA CLASS I HIGH DEFIN QUAL: CPT | Mod: TXP | Performed by: NURSE PRACTITIONER

## 2025-02-12 PROCEDURE — 86833 HLA CLASS II HIGH DEFIN QUAL: CPT | Mod: TXP | Performed by: NURSE PRACTITIONER

## 2025-02-12 PROCEDURE — 86977 RBC SERUM PRETX INCUBJ/INHIB: CPT | Mod: 59 | Performed by: NURSE PRACTITIONER

## 2025-02-14 ENCOUNTER — TELEPHONE (OUTPATIENT)
Dept: TRANSPLANT | Facility: CLINIC | Age: 55
End: 2025-02-14
Payer: MEDICARE

## 2025-02-14 NOTE — TELEPHONE ENCOUNTER
ON-CALL / UNABLE TO FIND NOTE    UNOS# [id number]    Notified by Kaushik Mae, , of  donor kidney offer for Misael Owens, MRN: [id number].  Multiple attempts have been made to contact the patient with no response received.  Was able to reach Kim Lynch (listed as significant other) who states that she will make some calls to see if she can get someone to have him call us back.  Call back information was provided.  No response from patient.    Dr. Redding notified of unsuccessful attempts to contact patient.  Coordinator instructed to wait until morning to attempt further contact.  Procurement notified.      Notified pre-transplant office regarding inability to contact patient.    25  0700:  Case passed on to Christine Shirley RN.

## 2025-02-15 ENCOUNTER — NURSE TRIAGE (OUTPATIENT)
Dept: ADMINISTRATIVE | Facility: CLINIC | Age: 55
End: 2025-02-15
Payer: MEDICARE

## 2025-02-15 NOTE — TELEPHONE ENCOUNTER
KRISSY pt.  Kidney transplant wait list  Reports that transplant team called him yesterday         Denies current symptoms  Reports that he spoke with someone yesterday wno advsied pt to call back Monday    Advised pt per dispo to call PCP when the office is open.   Also sent urgent message to transplant team to reach out to patient on Monday.       Reason for Disposition   [1] Caller requesting NON-URGENT health information AND [2] PCP's office is the best resource    Protocols used: Information Only Call - No Triage-A-

## 2025-02-18 ENCOUNTER — LAB VISIT (OUTPATIENT)
Dept: LAB | Facility: HOSPITAL | Age: 55
End: 2025-02-18
Payer: MEDICARE

## 2025-02-18 ENCOUNTER — TELEPHONE (OUTPATIENT)
Dept: TRANSPLANT | Facility: CLINIC | Age: 55
End: 2025-02-18
Payer: MEDICARE

## 2025-02-18 DIAGNOSIS — Z76.82 ORGAN TRANSPLANT CANDIDATE: Primary | ICD-10-CM

## 2025-02-18 DIAGNOSIS — Z76.82 PRE-KIDNEY TRANSPLANT, LISTED: ICD-10-CM

## 2025-02-18 NOTE — TELEPHONE ENCOUNTER
Left message for patient to discuss that he missed out on organ offer on 2/14 due to coordinator unable to reach him by phone. Need to review on call process and verify his phone numbers.

## 2025-02-26 LAB — HPRA INTERPRETATION: NORMAL

## 2025-03-02 ENCOUNTER — DOCUMENTATION ONLY (OUTPATIENT)
Dept: TRANSPLANT | Facility: CLINIC | Age: 55
End: 2025-03-02
Payer: MEDICARE

## 2025-03-02 NOTE — PROGRESS NOTES
ON-CALL NOTE    UNOS# [id number]    Notified by Kaushik Mae, , that Misael Owens is eligible for kidney back up offer.  Spoke with patient and identified no acute medical issues with telephone assessment. Protocol script read to patient regarding N/A, standard donor offer. Patient verbalized understanding, all questions answered, patient accepts organ offer. Notified by Kaushik Mae that virtual crossmatch is  weak DSA A1, 1,559 .  Current sample of blood is available from date 2/12/25 for crossmatch.  Patient reports no sensitizing event since last blood sample for PRA received. Will notify [facility] to perform a retrospective  crossmatch per guideline if admitted.    Patient was asked if they have had a positive COVID-19 test or if they have any signs or symptoms. Informed patient that they will be tested for COVID-19 upon arrival to the hospital, unless have a previous positive result. If tested and result is positive, the transplant will not be able to occur, they will be inactivated on the wait list for 21 days per protocol and required to quarantine.     Patient notified of plan to continue all normal activities and states understanding.  Dialysis unit notified will be notified if admitted.

## 2025-03-03 ENCOUNTER — TELEPHONE (OUTPATIENT)
Dept: TRANSPLANT | Facility: CLINIC | Age: 55
End: 2025-03-03
Payer: MEDICARE

## 2025-03-03 NOTE — TELEPHONE ENCOUNTER
On Call Note  UNOS# [id number]    3/3 @ 0700 - Received case from Jacquelin SWIFT And Christine WAGNER    1530 - Notified by Antonio TREVINO, , that donor OR has been set for 3/4 @ 1100 and pt is still b/u at this time. Called and updated pt.    3/4 @ 0700 - Cased passed to Amy TREVINO

## 2025-03-04 ENCOUNTER — DOCUMENTATION ONLY (OUTPATIENT)
Dept: TRANSPLANT | Facility: CLINIC | Age: 55
End: 2025-03-04
Payer: MEDICARE

## 2025-03-05 ENCOUNTER — ANESTHESIA EVENT (OUTPATIENT)
Dept: SURGERY | Facility: HOSPITAL | Age: 55
End: 2025-03-05
Payer: MEDICARE

## 2025-03-05 ENCOUNTER — TELEPHONE (OUTPATIENT)
Dept: TRANSPLANT | Facility: CLINIC | Age: 55
End: 2025-03-05
Payer: MEDICARE

## 2025-03-05 ENCOUNTER — ANESTHESIA (OUTPATIENT)
Dept: SURGERY | Facility: HOSPITAL | Age: 55
End: 2025-03-05
Payer: MEDICARE

## 2025-03-05 ENCOUNTER — HOSPITAL ENCOUNTER (INPATIENT)
Facility: HOSPITAL | Age: 55
LOS: 2 days | Discharge: HOME | DRG: 652 | End: 2025-03-07
Attending: TRANSPLANT SURGERY | Admitting: TRANSPLANT SURGERY
Payer: MEDICARE

## 2025-03-05 DIAGNOSIS — D63.8 ANEMIA OF CHRONIC DISEASE: ICD-10-CM

## 2025-03-05 DIAGNOSIS — E87.20 METABOLIC ACIDOSIS: ICD-10-CM

## 2025-03-05 DIAGNOSIS — Z94.0 KIDNEY REPLACED BY TRANSPLANT: ICD-10-CM

## 2025-03-05 DIAGNOSIS — N18.6 BENIGN HYPERTENSION WITH ESRD (END-STAGE RENAL DISEASE): ICD-10-CM

## 2025-03-05 DIAGNOSIS — Z76.82 AWAITING ORGAN TRANSPLANT STATUS: Primary | ICD-10-CM

## 2025-03-05 DIAGNOSIS — N18.6 ESRD ON HEMODIALYSIS: Primary | ICD-10-CM

## 2025-03-05 DIAGNOSIS — Z01.818 PRE-OP EVALUATION: ICD-10-CM

## 2025-03-05 DIAGNOSIS — N18.6 ESRD ON DIALYSIS: Chronic | ICD-10-CM

## 2025-03-05 DIAGNOSIS — N18.6 ESRD (END STAGE RENAL DISEASE): ICD-10-CM

## 2025-03-05 DIAGNOSIS — I12.0 BENIGN HYPERTENSION WITH ESRD (END-STAGE RENAL DISEASE): ICD-10-CM

## 2025-03-05 DIAGNOSIS — Z79.60 LONG-TERM USE OF IMMUNOSUPPRESSANT MEDICATION: ICD-10-CM

## 2025-03-05 DIAGNOSIS — Z99.2 ESRD ON DIALYSIS: Chronic | ICD-10-CM

## 2025-03-05 DIAGNOSIS — Z94.0 S/P KIDNEY TRANSPLANT: Primary | ICD-10-CM

## 2025-03-05 DIAGNOSIS — R73.9 STEROID-INDUCED HYPERGLYCEMIA: ICD-10-CM

## 2025-03-05 DIAGNOSIS — Z99.2 ESRD ON HEMODIALYSIS: Primary | ICD-10-CM

## 2025-03-05 DIAGNOSIS — Z29.89 PROPHYLACTIC IMMUNOTHERAPY: ICD-10-CM

## 2025-03-05 DIAGNOSIS — Z91.89 AT RISK FOR OPPORTUNISTIC INFECTIONS: ICD-10-CM

## 2025-03-05 DIAGNOSIS — T38.0X5A STEROID-INDUCED HYPERGLYCEMIA: ICD-10-CM

## 2025-03-05 LAB
ABO + RH BLD: NORMAL
ALBUMIN SERPL BCP-MCNC: 3.3 G/DL (ref 3.5–5.2)
ALBUMIN SERPL BCP-MCNC: 3.7 G/DL (ref 3.5–5.2)
ALBUMIN SERPL BCP-MCNC: 4 G/DL (ref 3.5–5.2)
ALP SERPL-CCNC: 65 U/L (ref 40–150)
ALT SERPL W/O P-5'-P-CCNC: 9 U/L (ref 10–44)
ANION GAP SERPL CALC-SCNC: 12 MMOL/L (ref 8–16)
ANION GAP SERPL CALC-SCNC: 13 MMOL/L (ref 8–16)
ANION GAP SERPL CALC-SCNC: 16 MMOL/L (ref 8–16)
AST SERPL-CCNC: 15 U/L (ref 10–40)
BASOPHILS # BLD AUTO: 0 K/UL (ref 0–0.2)
BASOPHILS # BLD AUTO: 0.07 K/UL (ref 0–0.2)
BASOPHILS NFR BLD: 0 % (ref 0–1.9)
BASOPHILS NFR BLD: 1.2 % (ref 0–1.9)
BILIRUB SERPL-MCNC: 0.7 MG/DL (ref 0.1–1)
BLD GP AB SCN CELLS X3 SERPL QL: NORMAL
BUN SERPL-MCNC: 43 MG/DL (ref 6–20)
BUN SERPL-MCNC: 44 MG/DL (ref 6–20)
BUN SERPL-MCNC: 45 MG/DL (ref 6–20)
CALCIUM SERPL-MCNC: 8.1 MG/DL (ref 8.7–10.5)
CALCIUM SERPL-MCNC: 8.6 MG/DL (ref 8.7–10.5)
CALCIUM SERPL-MCNC: 9.3 MG/DL (ref 8.7–10.5)
CHLORIDE SERPL-SCNC: 104 MMOL/L (ref 95–110)
CHLORIDE SERPL-SCNC: 107 MMOL/L (ref 95–110)
CHLORIDE SERPL-SCNC: 107 MMOL/L (ref 95–110)
CO2 SERPL-SCNC: 19 MMOL/L (ref 23–29)
CO2 SERPL-SCNC: 21 MMOL/L (ref 23–29)
CO2 SERPL-SCNC: 22 MMOL/L (ref 23–29)
CREAT SERPL-MCNC: 10.6 MG/DL (ref 0.5–1.4)
CREAT SERPL-MCNC: 10.7 MG/DL (ref 0.5–1.4)
CREAT SERPL-MCNC: 10.9 MG/DL (ref 0.5–1.4)
DIFFERENTIAL METHOD BLD: ABNORMAL
DIFFERENTIAL METHOD BLD: ABNORMAL
EOSINOPHIL # BLD AUTO: 0 K/UL (ref 0–0.5)
EOSINOPHIL # BLD AUTO: 0.3 K/UL (ref 0–0.5)
EOSINOPHIL NFR BLD: 0.1 % (ref 0–8)
EOSINOPHIL NFR BLD: 4.4 % (ref 0–8)
ERYTHROCYTE [DISTWIDTH] IN BLOOD BY AUTOMATED COUNT: 14.1 % (ref 11.5–14.5)
ERYTHROCYTE [DISTWIDTH] IN BLOOD BY AUTOMATED COUNT: 14.5 % (ref 11.5–14.5)
EST. GFR  (NO RACE VARIABLE): 5.1 ML/MIN/1.73 M^2
EST. GFR  (NO RACE VARIABLE): 5.2 ML/MIN/1.73 M^2
EST. GFR  (NO RACE VARIABLE): 5.2 ML/MIN/1.73 M^2
GLUCOSE SERPL-MCNC: 112 MG/DL (ref 70–110)
GLUCOSE SERPL-MCNC: 128 MG/DL (ref 70–110)
GLUCOSE SERPL-MCNC: 85 MG/DL (ref 70–110)
GLUCOSE SERPL-MCNC: 94 MG/DL (ref 70–110)
HBV CORE AB SERPL QL IA: NORMAL
HBV CORE IGM SERPL QL IA: NORMAL
HBV SURFACE AG SERPL QL IA: NORMAL
HCO3 UR-SCNC: 19.8 MMOL/L (ref 24–28)
HCT VFR BLD AUTO: 30.4 % (ref 40–54)
HCT VFR BLD AUTO: 30.9 % (ref 40–54)
HCT VFR BLD AUTO: 32.6 % (ref 40–54)
HCT VFR BLD CALC: 28 %PCV (ref 36–54)
HCV AB SERPL QL IA: NORMAL
HGB BLD-MCNC: 10.7 G/DL (ref 14–18)
HGB BLD-MCNC: 9.5 G/DL (ref 14–18)
HIV 1+2 AB+HIV1 P24 AG SERPL QL IA: NORMAL
IMM GRANULOCYTES # BLD AUTO: 0.03 K/UL (ref 0–0.04)
IMM GRANULOCYTES # BLD AUTO: 0.04 K/UL (ref 0–0.04)
IMM GRANULOCYTES NFR BLD AUTO: 0.5 % (ref 0–0.5)
IMM GRANULOCYTES NFR BLD AUTO: 0.5 % (ref 0–0.5)
INR PPP: 1.1 (ref 0.8–1.2)
LYMPHOCYTES # BLD AUTO: 0 K/UL (ref 1–4.8)
LYMPHOCYTES # BLD AUTO: 1.2 K/UL (ref 1–4.8)
LYMPHOCYTES NFR BLD: 0.1 % (ref 18–48)
LYMPHOCYTES NFR BLD: 20.4 % (ref 18–48)
MCH RBC QN AUTO: 29.2 PG (ref 27–31)
MCH RBC QN AUTO: 30.2 PG (ref 27–31)
MCHC RBC AUTO-ENTMCNC: 31.3 G/DL (ref 32–36)
MCHC RBC AUTO-ENTMCNC: 32.8 G/DL (ref 32–36)
MCV RBC AUTO: 92 FL (ref 82–98)
MCV RBC AUTO: 94 FL (ref 82–98)
MONOCYTES # BLD AUTO: 0.2 K/UL (ref 0.3–1)
MONOCYTES # BLD AUTO: 0.4 K/UL (ref 0.3–1)
MONOCYTES NFR BLD: 2 % (ref 4–15)
MONOCYTES NFR BLD: 6.4 % (ref 4–15)
NEUTROPHILS # BLD AUTO: 3.8 K/UL (ref 1.8–7.7)
NEUTROPHILS # BLD AUTO: 8.4 K/UL (ref 1.8–7.7)
NEUTROPHILS NFR BLD: 67.1 % (ref 38–73)
NEUTROPHILS NFR BLD: 97.3 % (ref 38–73)
NRBC BLD-RTO: 0 /100 WBC
NRBC BLD-RTO: 0 /100 WBC
OHS QRS DURATION: 94 MS
OHS QTC CALCULATION: 464 MS
PCO2 BLDA: 32.5 MMHG (ref 35–45)
PH SMN: 7.39 [PH] (ref 7.35–7.45)
PHOSPHATE SERPL-MCNC: 4 MG/DL (ref 2.7–4.5)
PHOSPHATE SERPL-MCNC: 4.4 MG/DL (ref 2.7–4.5)
PHOSPHATE SERPL-MCNC: 4.8 MG/DL (ref 2.7–4.5)
PLATELET # BLD AUTO: 163 K/UL (ref 150–450)
PLATELET # BLD AUTO: 228 K/UL (ref 150–450)
PMV BLD AUTO: 9.3 FL (ref 9.2–12.9)
PMV BLD AUTO: 9.5 FL (ref 9.2–12.9)
PO2 BLDA: 179 MMHG (ref 80–100)
POC BE: -5 MMOL/L
POC IONIZED CALCIUM: 1.06 MMOL/L (ref 1.06–1.42)
POC SATURATED O2: 100 % (ref 95–100)
POC TCO2: 21 MMOL/L (ref 23–27)
POCT GLUCOSE: 109 MG/DL (ref 70–110)
POTASSIUM BLD-SCNC: 3.6 MMOL/L (ref 3.5–5.1)
POTASSIUM SERPL-SCNC: 3.8 MMOL/L (ref 3.5–5.1)
POTASSIUM SERPL-SCNC: 4.1 MMOL/L (ref 3.5–5.1)
POTASSIUM SERPL-SCNC: 4.4 MMOL/L (ref 3.5–5.1)
PROT SERPL-MCNC: 8.1 G/DL (ref 6–8.4)
PROTHROMBIN TIME: 11.9 SEC (ref 9–12.5)
RBC # BLD AUTO: 3.25 M/UL (ref 4.6–6.2)
RBC # BLD AUTO: 3.54 M/UL (ref 4.6–6.2)
SAMPLE: ABNORMAL
SODIUM BLD-SCNC: 138 MMOL/L (ref 136–145)
SODIUM SERPL-SCNC: 139 MMOL/L (ref 136–145)
SODIUM SERPL-SCNC: 140 MMOL/L (ref 136–145)
SODIUM SERPL-SCNC: 142 MMOL/L (ref 136–145)
SPECIMEN OUTDATE: NORMAL
WBC # BLD AUTO: 5.64 K/UL (ref 3.9–12.7)
WBC # BLD AUTO: 8.66 K/UL (ref 3.9–12.7)

## 2025-03-05 PROCEDURE — 86850 RBC ANTIBODY SCREEN: CPT | Performed by: PHYSICIAN ASSISTANT

## 2025-03-05 PROCEDURE — 37000008 HC ANESTHESIA 1ST 15 MINUTES: Performed by: TRANSPLANT SURGERY

## 2025-03-05 PROCEDURE — 86803 HEPATITIS C AB TEST: CPT | Performed by: PHYSICIAN ASSISTANT

## 2025-03-05 PROCEDURE — 63600175 PHARM REV CODE 636 W HCPCS: Performed by: TRANSPLANT SURGERY

## 2025-03-05 PROCEDURE — 99222 1ST HOSP IP/OBS MODERATE 55: CPT | Mod: NTX,,, | Performed by: NURSE PRACTITIONER

## 2025-03-05 PROCEDURE — 63600175 PHARM REV CODE 636 W HCPCS: Mod: JZ,TB,NTX | Performed by: PHYSICIAN ASSISTANT

## 2025-03-05 PROCEDURE — 37000009 HC ANESTHESIA EA ADD 15 MINS: Performed by: TRANSPLANT SURGERY

## 2025-03-05 PROCEDURE — 25000003 PHARM REV CODE 250: Performed by: STUDENT IN AN ORGANIZED HEALTH CARE EDUCATION/TRAINING PROGRAM

## 2025-03-05 PROCEDURE — 20600001 HC STEP DOWN PRIVATE ROOM

## 2025-03-05 PROCEDURE — 94761 N-INVAS EAR/PLS OXIMETRY MLT: CPT

## 2025-03-05 PROCEDURE — 36000930 HC OR TIME LEV VII 1ST 15 MIN: Performed by: TRANSPLANT SURGERY

## 2025-03-05 PROCEDURE — S5010 5% DEXTROSE AND 0.45% SALINE: HCPCS

## 2025-03-05 PROCEDURE — 86706 HEP B SURFACE ANTIBODY: CPT | Performed by: PHYSICIAN ASSISTANT

## 2025-03-05 PROCEDURE — C2617 STENT, NON-COR, TEM W/O DEL: HCPCS | Performed by: TRANSPLANT SURGERY

## 2025-03-05 PROCEDURE — 85610 PROTHROMBIN TIME: CPT | Performed by: PHYSICIAN ASSISTANT

## 2025-03-05 PROCEDURE — 93010 ELECTROCARDIOGRAM REPORT: CPT | Mod: NTX,,, | Performed by: STUDENT IN AN ORGANIZED HEALTH CARE EDUCATION/TRAINING PROGRAM

## 2025-03-05 PROCEDURE — 63600175 PHARM REV CODE 636 W HCPCS: Performed by: STUDENT IN AN ORGANIZED HEALTH CARE EDUCATION/TRAINING PROGRAM

## 2025-03-05 PROCEDURE — 86704 HEP B CORE ANTIBODY TOTAL: CPT | Performed by: PHYSICIAN ASSISTANT

## 2025-03-05 PROCEDURE — 0TY00Z0 TRANSPLANTATION OF RIGHT KIDNEY, ALLOGENEIC, OPEN APPROACH: ICD-10-PCS | Performed by: TRANSPLANT SURGERY

## 2025-03-05 PROCEDURE — 36415 COLL VENOUS BLD VENIPUNCTURE: CPT | Performed by: PHYSICIAN ASSISTANT

## 2025-03-05 PROCEDURE — 27201423 OPTIME MED/SURG SUP & DEVICES STERILE SUPPLY: Performed by: TRANSPLANT SURGERY

## 2025-03-05 PROCEDURE — 63600175 PHARM REV CODE 636 W HCPCS: Mod: NTX | Performed by: PHYSICIAN ASSISTANT

## 2025-03-05 PROCEDURE — 27000221 HC OXYGEN, UP TO 24 HOURS

## 2025-03-05 PROCEDURE — 25000003 PHARM REV CODE 250: Mod: NTX | Performed by: PHYSICIAN ASSISTANT

## 2025-03-05 PROCEDURE — 71000039 HC RECOVERY, EACH ADD'L HOUR: Performed by: TRANSPLANT SURGERY

## 2025-03-05 PROCEDURE — 85014 HEMATOCRIT: CPT

## 2025-03-05 PROCEDURE — 71000015 HC POSTOP RECOV 1ST HR: Performed by: TRANSPLANT SURGERY

## 2025-03-05 PROCEDURE — 63600175 PHARM REV CODE 636 W HCPCS

## 2025-03-05 PROCEDURE — 36000931 HC OR TIME LEV VII EA ADD 15 MIN: Performed by: TRANSPLANT SURGERY

## 2025-03-05 PROCEDURE — 81200001 HC KIDNEY ACQUISITION - CADAVER

## 2025-03-05 PROCEDURE — 27000207 HC ISOLATION

## 2025-03-05 PROCEDURE — 63600175 PHARM REV CODE 636 W HCPCS: Mod: NTX | Performed by: STUDENT IN AN ORGANIZED HEALTH CARE EDUCATION/TRAINING PROGRAM

## 2025-03-05 PROCEDURE — 87389 HIV-1 AG W/HIV-1&-2 AB AG IA: CPT | Performed by: PHYSICIAN ASSISTANT

## 2025-03-05 PROCEDURE — 80069 RENAL FUNCTION PANEL: CPT | Performed by: PHYSICIAN ASSISTANT

## 2025-03-05 PROCEDURE — 81300002 HC KIDNEY TRANSPORT, GROUND 4-5 HOURS

## 2025-03-05 PROCEDURE — 93005 ELECTROCARDIOGRAM TRACING: CPT | Mod: NTX

## 2025-03-05 PROCEDURE — 25000003 PHARM REV CODE 250

## 2025-03-05 PROCEDURE — 99223 1ST HOSP IP/OBS HIGH 75: CPT | Mod: 57,AI,NTX, | Performed by: PHYSICIAN ASSISTANT

## 2025-03-05 PROCEDURE — 86705 HEP B CORE ANTIBODY IGM: CPT | Performed by: PHYSICIAN ASSISTANT

## 2025-03-05 PROCEDURE — 85025 COMPLETE CBC W/AUTO DIFF WBC: CPT | Mod: 91 | Performed by: PHYSICIAN ASSISTANT

## 2025-03-05 PROCEDURE — 80053 COMPREHEN METABOLIC PANEL: CPT | Performed by: PHYSICIAN ASSISTANT

## 2025-03-05 PROCEDURE — 71000033 HC RECOVERY, INTIAL HOUR: Performed by: TRANSPLANT SURGERY

## 2025-03-05 PROCEDURE — 87522 HEPATITIS C REVRS TRNSCRPJ: CPT | Performed by: PHYSICIAN ASSISTANT

## 2025-03-05 PROCEDURE — 80069 RENAL FUNCTION PANEL: CPT | Mod: 91

## 2025-03-05 PROCEDURE — 63600175 PHARM REV CODE 636 W HCPCS: Mod: JZ,TB

## 2025-03-05 PROCEDURE — 84100 ASSAY OF PHOSPHORUS: CPT | Performed by: PHYSICIAN ASSISTANT

## 2025-03-05 PROCEDURE — 87340 HEPATITIS B SURFACE AG IA: CPT | Performed by: PHYSICIAN ASSISTANT

## 2025-03-05 PROCEDURE — 82962 GLUCOSE BLOOD TEST: CPT | Performed by: TRANSPLANT SURGERY

## 2025-03-05 PROCEDURE — 36620 INSERTION CATHETER ARTERY: CPT | Mod: 59,,, | Performed by: ANESTHESIOLOGY

## 2025-03-05 PROCEDURE — 85025 COMPLETE CBC W/AUTO DIFF WBC: CPT | Performed by: PHYSICIAN ASSISTANT

## 2025-03-05 PROCEDURE — 99900035 HC TECH TIME PER 15 MIN (STAT)

## 2025-03-05 DEVICE — SOF-FLEX DOUBLE PIGTAIL URETERAL STENT SET
Type: IMPLANTABLE DEVICE | Site: URETER | Status: FUNCTIONAL
Brand: SOF-FLEX

## 2025-03-05 RX ORDER — METHYLPREDNISOLONE SOD SUCC 125 MG
250 VIAL (EA) INJECTION ONCE
Status: COMPLETED | OUTPATIENT
Start: 2025-03-06 | End: 2025-03-06

## 2025-03-05 RX ORDER — SODIUM CHLORIDE 0.9 % (FLUSH) 0.9 %
10 SYRINGE (ML) INJECTION
Status: DISCONTINUED | OUTPATIENT
Start: 2025-03-05 | End: 2025-03-05 | Stop reason: HOSPADM

## 2025-03-05 RX ORDER — HEPARIN SOD,PORCINE/0.9 % NACL 1000/500ML
INTRAVENOUS SOLUTION INTRAVENOUS
Status: DISCONTINUED | OUTPATIENT
Start: 2025-03-05 | End: 2025-03-05 | Stop reason: HOSPADM

## 2025-03-05 RX ORDER — ACETAMINOPHEN 10 MG/ML
INJECTION, SOLUTION INTRAVENOUS
Status: DISCONTINUED | OUTPATIENT
Start: 2025-03-05 | End: 2025-03-05

## 2025-03-05 RX ORDER — MYCOPHENOLATE MOFETIL 250 MG/1
1000 CAPSULE ORAL 2 TIMES DAILY
Status: DISCONTINUED | OUTPATIENT
Start: 2025-03-05 | End: 2025-03-07 | Stop reason: HOSPADM

## 2025-03-05 RX ORDER — CEFAZOLIN 2 G/1
2 INJECTION, POWDER, FOR SOLUTION INTRAMUSCULAR; INTRAVENOUS
Status: DISCONTINUED | OUTPATIENT
Start: 2025-03-05 | End: 2025-03-05

## 2025-03-05 RX ORDER — SODIUM CHLORIDE 0.9 % (FLUSH) 0.9 %
10 SYRINGE (ML) INJECTION
Status: DISCONTINUED | OUTPATIENT
Start: 2025-03-05 | End: 2025-03-07 | Stop reason: HOSPADM

## 2025-03-05 RX ORDER — ACETAMINOPHEN 650 MG/20.3ML
650 LIQUID ORAL ONCE
Status: DISCONTINUED | OUTPATIENT
Start: 2025-03-05 | End: 2025-03-05 | Stop reason: HOSPADM

## 2025-03-05 RX ORDER — MUPIROCIN 20 MG/G
OINTMENT TOPICAL
Status: COMPLETED
Start: 2025-03-05 | End: 2025-03-05

## 2025-03-05 RX ORDER — FAMOTIDINE 20 MG/1
20 TABLET, FILM COATED ORAL NIGHTLY
Status: DISCONTINUED | OUTPATIENT
Start: 2025-03-05 | End: 2025-03-07 | Stop reason: HOSPADM

## 2025-03-05 RX ORDER — GLUCAGON 1 MG
1 KIT INJECTION
Status: DISCONTINUED | OUTPATIENT
Start: 2025-03-05 | End: 2025-03-05 | Stop reason: HOSPADM

## 2025-03-05 RX ORDER — FENTANYL CITRATE 50 UG/ML
INJECTION, SOLUTION INTRAMUSCULAR; INTRAVENOUS
Status: DISCONTINUED | OUTPATIENT
Start: 2025-03-05 | End: 2025-03-05

## 2025-03-05 RX ORDER — TACROLIMUS 1 MG/1
3 CAPSULE ORAL 2 TIMES DAILY
Status: DISCONTINUED | OUTPATIENT
Start: 2025-03-05 | End: 2025-03-07

## 2025-03-05 RX ORDER — INSULIN ASPART 100 [IU]/ML
0-5 INJECTION, SOLUTION INTRAVENOUS; SUBCUTANEOUS
Status: DISCONTINUED | OUTPATIENT
Start: 2025-03-05 | End: 2025-03-06

## 2025-03-05 RX ORDER — IBUPROFEN 200 MG
16 TABLET ORAL
Status: DISCONTINUED | OUTPATIENT
Start: 2025-03-05 | End: 2025-03-07 | Stop reason: HOSPADM

## 2025-03-05 RX ORDER — BUPIVACAINE HYDROCHLORIDE 2.5 MG/ML
INJECTION, SOLUTION EPIDURAL; INFILTRATION; INTRACAUDAL; PERINEURAL
Status: DISCONTINUED | OUTPATIENT
Start: 2025-03-05 | End: 2025-03-05 | Stop reason: HOSPADM

## 2025-03-05 RX ORDER — PROCHLORPERAZINE EDISYLATE 5 MG/ML
5 INJECTION INTRAMUSCULAR; INTRAVENOUS EVERY 30 MIN PRN
Status: DISCONTINUED | OUTPATIENT
Start: 2025-03-05 | End: 2025-03-05 | Stop reason: HOSPADM

## 2025-03-05 RX ORDER — GLUCAGON 1 MG
1 KIT INJECTION
Status: DISCONTINUED | OUTPATIENT
Start: 2025-03-05 | End: 2025-03-07 | Stop reason: HOSPADM

## 2025-03-05 RX ORDER — ACETAMINOPHEN 325 MG/1
650 TABLET ORAL EVERY 8 HOURS
Status: DISCONTINUED | OUTPATIENT
Start: 2025-03-05 | End: 2025-03-07 | Stop reason: HOSPADM

## 2025-03-05 RX ORDER — MUPIROCIN 20 MG/G
OINTMENT TOPICAL
Status: DISCONTINUED | OUTPATIENT
Start: 2025-03-05 | End: 2025-03-05

## 2025-03-05 RX ORDER — PREDNISONE 20 MG/1
20 TABLET ORAL DAILY
Status: DISCONTINUED | OUTPATIENT
Start: 2025-03-07 | End: 2025-03-07 | Stop reason: HOSPADM

## 2025-03-05 RX ORDER — DEXTROSE MONOHYDRATE AND SODIUM CHLORIDE 5; .45 G/100ML; G/100ML
INJECTION, SOLUTION INTRAVENOUS CONTINUOUS
Status: DISCONTINUED | OUTPATIENT
Start: 2025-03-05 | End: 2025-03-06

## 2025-03-05 RX ORDER — CEFAZOLIN 2 G/1
2 INJECTION, POWDER, FOR SOLUTION INTRAMUSCULAR; INTRAVENOUS
Status: COMPLETED | OUTPATIENT
Start: 2025-03-05 | End: 2025-03-06

## 2025-03-05 RX ORDER — MUPIROCIN 20 MG/G
1 OINTMENT TOPICAL 2 TIMES DAILY
Status: DISCONTINUED | OUTPATIENT
Start: 2025-03-05 | End: 2025-03-07 | Stop reason: HOSPADM

## 2025-03-05 RX ORDER — DIPHENHYDRAMINE HCL 25 MG
25 CAPSULE ORAL
Status: COMPLETED | OUTPATIENT
Start: 2025-03-06 | End: 2025-03-06

## 2025-03-05 RX ORDER — HYDROMORPHONE HYDROCHLORIDE 1 MG/ML
0.2 INJECTION, SOLUTION INTRAMUSCULAR; INTRAVENOUS; SUBCUTANEOUS EVERY 5 MIN PRN
Refills: 0 | Status: DISCONTINUED | OUTPATIENT
Start: 2025-03-05 | End: 2025-03-05 | Stop reason: HOSPADM

## 2025-03-05 RX ORDER — FUROSEMIDE 10 MG/ML
INJECTION INTRAMUSCULAR; INTRAVENOUS
Status: DISCONTINUED | OUTPATIENT
Start: 2025-03-05 | End: 2025-03-05

## 2025-03-05 RX ORDER — PHENYLEPHRINE HYDROCHLORIDE 10 MG/ML
INJECTION INTRAVENOUS
Status: DISCONTINUED | OUTPATIENT
Start: 2025-03-05 | End: 2025-03-05

## 2025-03-05 RX ORDER — VALGANCICLOVIR 450 MG/1
450 TABLET, FILM COATED ORAL EVERY MORNING
Status: DISCONTINUED | OUTPATIENT
Start: 2025-03-15 | End: 2025-03-07 | Stop reason: HOSPADM

## 2025-03-05 RX ORDER — LIDOCAINE HYDROCHLORIDE 20 MG/ML
INJECTION INTRAVENOUS
Status: DISCONTINUED | OUTPATIENT
Start: 2025-03-05 | End: 2025-03-05

## 2025-03-05 RX ORDER — DOCUSATE SODIUM 100 MG/1
100 CAPSULE, LIQUID FILLED ORAL 3 TIMES DAILY
Status: DISCONTINUED | OUTPATIENT
Start: 2025-03-05 | End: 2025-03-07 | Stop reason: HOSPADM

## 2025-03-05 RX ORDER — BISACODYL 5 MG
10 TABLET, DELAYED RELEASE (ENTERIC COATED) ORAL NIGHTLY
Status: DISCONTINUED | OUTPATIENT
Start: 2025-03-05 | End: 2025-03-07 | Stop reason: HOSPADM

## 2025-03-05 RX ORDER — OXYCODONE HYDROCHLORIDE 5 MG/1
5 TABLET ORAL EVERY 6 HOURS PRN
Refills: 0 | Status: DISCONTINUED | OUTPATIENT
Start: 2025-03-05 | End: 2025-03-07 | Stop reason: HOSPADM

## 2025-03-05 RX ORDER — PROPOFOL 10 MG/ML
VIAL (ML) INTRAVENOUS
Status: DISCONTINUED | OUTPATIENT
Start: 2025-03-05 | End: 2025-03-05

## 2025-03-05 RX ORDER — EPINEPHRINE 1 MG/ML
1 INJECTION, SOLUTION, CONCENTRATE INTRAVENOUS ONCE AS NEEDED
Status: DISCONTINUED | OUTPATIENT
Start: 2025-03-05 | End: 2025-03-07 | Stop reason: HOSPADM

## 2025-03-05 RX ORDER — CEFAZOLIN SODIUM 1 G/3ML
INJECTION, POWDER, FOR SOLUTION INTRAMUSCULAR; INTRAVENOUS
Status: DISCONTINUED | OUTPATIENT
Start: 2025-03-05 | End: 2025-03-05 | Stop reason: HOSPADM

## 2025-03-05 RX ORDER — GLUCAGON 1 MG
1 KIT INJECTION CONTINUOUS PRN
Status: DISCONTINUED | OUTPATIENT
Start: 2025-03-05 | End: 2025-03-07 | Stop reason: HOSPADM

## 2025-03-05 RX ORDER — IBUPROFEN 200 MG
24 TABLET ORAL
Status: DISCONTINUED | OUTPATIENT
Start: 2025-03-05 | End: 2025-03-07 | Stop reason: HOSPADM

## 2025-03-05 RX ORDER — OXYCODONE HYDROCHLORIDE 5 MG/1
5 TABLET ORAL
Refills: 0 | Status: DISCONTINUED | OUTPATIENT
Start: 2025-03-05 | End: 2025-03-05 | Stop reason: HOSPADM

## 2025-03-05 RX ORDER — KETAMINE HCL IN 0.9 % NACL 50 MG/5 ML
SYRINGE (ML) INTRAVENOUS
Status: DISCONTINUED | OUTPATIENT
Start: 2025-03-05 | End: 2025-03-05

## 2025-03-05 RX ORDER — HEPARIN SODIUM 5000 [USP'U]/ML
5000 INJECTION, SOLUTION INTRAVENOUS; SUBCUTANEOUS ONCE
Status: COMPLETED | OUTPATIENT
Start: 2025-03-05 | End: 2025-03-05

## 2025-03-05 RX ORDER — DIPHENHYDRAMINE HCL 25 MG
50 CAPSULE ORAL ONCE AS NEEDED
Status: DISCONTINUED | OUTPATIENT
Start: 2025-03-05 | End: 2025-03-07 | Stop reason: HOSPADM

## 2025-03-05 RX ORDER — DIPHENHYDRAMINE HYDROCHLORIDE 50 MG/ML
50 INJECTION, SOLUTION INTRAMUSCULAR; INTRAVENOUS ONCE
Status: COMPLETED | OUTPATIENT
Start: 2025-03-05 | End: 2025-03-05

## 2025-03-05 RX ORDER — MANNITOL 250 MG/ML
INJECTION, SOLUTION INTRAVENOUS
Status: DISCONTINUED | OUTPATIENT
Start: 2025-03-05 | End: 2025-03-05

## 2025-03-05 RX ORDER — INSULIN ASPART 100 [IU]/ML
0-10 INJECTION, SOLUTION INTRAVENOUS; SUBCUTANEOUS
Status: DISCONTINUED | OUTPATIENT
Start: 2025-03-05 | End: 2025-03-07 | Stop reason: HOSPADM

## 2025-03-05 RX ORDER — ACETAMINOPHEN 325 MG/1
650 TABLET ORAL
Status: COMPLETED | OUTPATIENT
Start: 2025-03-06 | End: 2025-03-06

## 2025-03-05 RX ORDER — PREGABALIN 75 MG/1
75 CAPSULE ORAL
Status: COMPLETED | OUTPATIENT
Start: 2025-03-05 | End: 2025-03-05

## 2025-03-05 RX ORDER — MIDAZOLAM HYDROCHLORIDE 1 MG/ML
INJECTION INTRAMUSCULAR; INTRAVENOUS
Status: DISCONTINUED | OUTPATIENT
Start: 2025-03-05 | End: 2025-03-05

## 2025-03-05 RX ORDER — SODIUM CHLORIDE 9 MG/ML
INJECTION, SOLUTION INTRAVENOUS CONTINUOUS
Status: DISCONTINUED | OUTPATIENT
Start: 2025-03-05 | End: 2025-03-06

## 2025-03-05 RX ORDER — ONDANSETRON HYDROCHLORIDE 2 MG/ML
4 INJECTION, SOLUTION INTRAVENOUS EVERY 6 HOURS PRN
Status: DISCONTINUED | OUTPATIENT
Start: 2025-03-05 | End: 2025-03-07 | Stop reason: HOSPADM

## 2025-03-05 RX ORDER — HEPARIN SODIUM 5000 [USP'U]/ML
5000 INJECTION, SOLUTION INTRAVENOUS; SUBCUTANEOUS EVERY 8 HOURS
Status: DISCONTINUED | OUTPATIENT
Start: 2025-03-05 | End: 2025-03-07 | Stop reason: HOSPADM

## 2025-03-05 RX ORDER — SULFAMETHOXAZOLE AND TRIMETHOPRIM 400; 80 MG/1; MG/1
1 TABLET ORAL EVERY MORNING
Status: DISCONTINUED | OUTPATIENT
Start: 2025-03-15 | End: 2025-03-07 | Stop reason: HOSPADM

## 2025-03-05 RX ORDER — ROCURONIUM BROMIDE 10 MG/ML
INJECTION, SOLUTION INTRAVENOUS
Status: DISCONTINUED | OUTPATIENT
Start: 2025-03-05 | End: 2025-03-05

## 2025-03-05 RX ADMIN — SUGAMMADEX 200 MG: 100 INJECTION, SOLUTION INTRAVENOUS at 05:03

## 2025-03-05 RX ADMIN — OXYCODONE 5 MG: 5 TABLET ORAL at 06:03

## 2025-03-05 RX ADMIN — ACETAMINOPHEN 650 MG: 10 INJECTION INTRAVENOUS at 03:03

## 2025-03-05 RX ADMIN — MUPIROCIN 1 G: 20 OINTMENT TOPICAL at 09:03

## 2025-03-05 RX ADMIN — DIPHENHYDRAMINE HYDROCHLORIDE 50 MG: 50 INJECTION, SOLUTION INTRAMUSCULAR; INTRAVENOUS at 03:03

## 2025-03-05 RX ADMIN — HEPARIN SODIUM 5000 UNITS: 5000 INJECTION INTRAVENOUS; SUBCUTANEOUS at 09:03

## 2025-03-05 RX ADMIN — HYDROMORPHONE HYDROCHLORIDE 0.2 MG: 1 INJECTION, SOLUTION INTRAMUSCULAR; INTRAVENOUS; SUBCUTANEOUS at 06:03

## 2025-03-05 RX ADMIN — MYCOPHENOLATE MOFETIL 1000 MG: 250 CAPSULE ORAL at 09:03

## 2025-03-05 RX ADMIN — PHENYLEPHRINE HYDROCHLORIDE 100 MCG: 10 INJECTION INTRAVENOUS at 02:03

## 2025-03-05 RX ADMIN — ACETAMINOPHEN 650 MG: 325 TABLET ORAL at 09:03

## 2025-03-05 RX ADMIN — Medication 10 MG: at 03:03

## 2025-03-05 RX ADMIN — HYDROMORPHONE HYDROCHLORIDE 0.2 MG: 1 INJECTION, SOLUTION INTRAMUSCULAR; INTRAVENOUS; SUBCUTANEOUS at 05:03

## 2025-03-05 RX ADMIN — PREGABALIN 75 MG: 75 CAPSULE ORAL at 01:03

## 2025-03-05 RX ADMIN — TACROLIMUS 3 MG: 1 CAPSULE ORAL at 06:03

## 2025-03-05 RX ADMIN — FUROSEMIDE 100 MG: 10 INJECTION, SOLUTION INTRAMUSCULAR; INTRAVENOUS at 04:03

## 2025-03-05 RX ADMIN — ANTI-THYMOCYTE GLOBULIN (RABBIT) 175 MG: 5 INJECTION, POWDER, LYOPHILIZED, FOR SOLUTION INTRAVENOUS at 03:03

## 2025-03-05 RX ADMIN — CEFAZOLIN 2 G: 2 INJECTION, POWDER, FOR SOLUTION INTRAMUSCULAR; INTRAVENOUS at 09:03

## 2025-03-05 RX ADMIN — MIDAZOLAM HYDROCHLORIDE 2 MG: 1 INJECTION, SOLUTION INTRAMUSCULAR; INTRAVENOUS at 02:03

## 2025-03-05 RX ADMIN — SODIUM CHLORIDE 200 ML: 9 INJECTION, SOLUTION INTRAVENOUS at 05:03

## 2025-03-05 RX ADMIN — FENTANYL CITRATE 50 MCG: 50 INJECTION, SOLUTION INTRAMUSCULAR; INTRAVENOUS at 02:03

## 2025-03-05 RX ADMIN — MANNITOL 25 G: 12.5 INJECTION, SOLUTION INTRAVENOUS at 04:03

## 2025-03-05 RX ADMIN — METHYLPREDNISOLONE SODIUM SUCCINATE 500 MG: 500 INJECTION INTRAMUSCULAR; INTRAVENOUS at 03:03

## 2025-03-05 RX ADMIN — PHENYLEPHRINE HYDROCHLORIDE 100 MCG: 10 INJECTION INTRAVENOUS at 03:03

## 2025-03-05 RX ADMIN — ONDANSETRON 4 MG: 2 INJECTION, SOLUTION INTRAMUSCULAR; INTRAVENOUS at 04:03

## 2025-03-05 RX ADMIN — PROPOFOL 200 MG: 10 INJECTION, EMULSION INTRAVENOUS at 02:03

## 2025-03-05 RX ADMIN — CEFAZOLIN 2 G: 330 INJECTION, POWDER, FOR SOLUTION INTRAMUSCULAR; INTRAVENOUS at 03:03

## 2025-03-05 RX ADMIN — DEXTROSE AND SODIUM CHLORIDE: 5; 450 INJECTION, SOLUTION INTRAVENOUS at 05:03

## 2025-03-05 RX ADMIN — SODIUM CHLORIDE: 0.9 INJECTION, SOLUTION INTRAVENOUS at 02:03

## 2025-03-05 RX ADMIN — Medication 20 MG: at 03:03

## 2025-03-05 RX ADMIN — LIDOCAINE HYDROCHLORIDE 100 MG: 20 INJECTION INTRAVENOUS at 02:03

## 2025-03-05 RX ADMIN — FENTANYL CITRATE 50 MCG: 50 INJECTION, SOLUTION INTRAMUSCULAR; INTRAVENOUS at 05:03

## 2025-03-05 RX ADMIN — Medication 10 MG: at 04:03

## 2025-03-05 RX ADMIN — PHENYLEPHRINE HYDROCHLORIDE 0.2 MCG/KG/MIN: 10 INJECTION INTRAVENOUS at 02:03

## 2025-03-05 RX ADMIN — ROCURONIUM BROMIDE 20 MG: 10 INJECTION, SOLUTION INTRAVENOUS at 03:03

## 2025-03-05 RX ADMIN — ROCURONIUM BROMIDE 50 MG: 10 INJECTION, SOLUTION INTRAVENOUS at 02:03

## 2025-03-05 RX ADMIN — HEPARIN SODIUM 5000 UNITS: 5000 INJECTION INTRAVENOUS; SUBCUTANEOUS at 12:03

## 2025-03-05 RX ADMIN — ROCURONIUM BROMIDE 10 MG: 10 INJECTION, SOLUTION INTRAVENOUS at 04:03

## 2025-03-05 NOTE — H&P
[facility] Transplant Stepdown  Kidney Transplant  H&P      Subjective:     Chief Complaint/Reason for Admission: Kidney Transplant    History of Present Illness:  Mr. Owens is a 54 y.o. year old Black or  male with ESRD secondary to HTN presenting as primary for kidney transplant. He has been on the wait list for a kidney transplant at [facility] [place] since 4/1/2020. Patient is currently on hemodialysis started on 4/1/2020. Patient is dialyzing on MWF schedule. Patient reports that he is tolerating dialysis well. EDW 83.5 kg. He has a LUE AV fistula. Patient denies any recent hospitalizations or ED visits. Dr James will be the primary surgeon. Pre op labs and imaging will be reviewed prior to surgery.     Dialysis History: Mr. Douglas with ESRD, requiring chronic dialysis who is on hemodialysis started on 4/1/2020. Patient is dialyzing on MWF schedule.  Patient reports that he is tolerating dialysis well.  Date of Last Dialysis: 3/3/25    Native urine output per day: ~1-1.5 cups     Previous Transplant: no    PTA Medications   Medication Sig    acetaminophen (TYLENOL) 325 MG tablet Take 325 mg by mouth every 6 (six) hours as needed for Pain. Takes 1-2 tablets prn    calcitRIOL (ROCALTROL) 0.5 MCG Cap Take 1 capsule (0.5 mcg total) by mouth once daily.    ergocalciferol (ERGOCALCIFEROL) 50,000 unit Cap Take 1 capsule (50,000 Units total) by mouth every 7 days.    ergocalciferol (ERGOCALCIFEROL) 50,000 unit Cap Take 1 capsule (50,000 Units total) by mouth every 7 days.    furosemide (LASIX) 40 MG tablet Take 1 tablet (40 mg total) by mouth once daily.    furosemide (LASIX) 40 MG tablet Take 1 tablet (40 mg total) by mouth once daily.    labetaloL (NORMODYNE) 200 MG tablet Take 1 tablet (200 mg total) by mouth every 12 (twelve) hours.    labetaloL (NORMODYNE) 200 MG tablet Take 1 tablet (200 mg total) by mouth every 12 (twelve) hours.    RENVELA 800 mg Tab Take 1 tablet (800 mg total) by mouth 4 (four) times  daily.       Review of patient's allergies indicates:   Allergen Reactions    Lisinopril Swelling     Facial swelling       Past Medical History:   Diagnosis Date    CKD (chronic kidney disease), stage IV     due to hypertension    ESRD on dialysis     Hypertension     Secondary hyperparathyroidism     Vitamin D deficiency      Past Surgical History:   Procedure Laterality Date    AV FISTULA PLACEMENT Left 1/7/2020    Procedure: CREATION, AV FISTULA, brachiobasillic;  Surgeon: Derrek Grant MD;  Location: [facility] OR 2ND FLR;  Service: Peripheral Vascular;  Laterality: Left;    COLONOSCOPY N/A 10/26/2020    Procedure: COLONOSCOPY;  Surgeon: Zeferino Doyle MD;  Location: [facility] ENDO (4TH FLR);  Service: Endoscopy;  Laterality: N/A;  dialysis Pt - Labs   Rapid    FISTULOGRAM Left 2/4/2020    Procedure: Fistulogram;  Surgeon: Derrek Grant MD;  Location: [facility] OR 2ND FLR;  Service: Peripheral Vascular;  Laterality: Left;  Fluro: 2.7 min  mGy: 23.74  contrast : 17ml    INSERTION OF DIALYSIS CATHETER  3/31/2020    Procedure: INSERTION, CATHETER, DIALYSIS;  Surgeon: Lm Espinosa MD;  Location: [facility];  Service: General;;    INSERTION OF DIALYSIS CATHETER      RENAL BIOPSY      TRANSPOSITION OF BASILIC VEIN Left 3/2/2020    Procedure: TRANSPOSITION, VEIN, BASILIC;  Surgeon: Derrek Grant MD;  Location: [facility] OR 2ND FLR;  Service: Peripheral Vascular;  Laterality: Left;    TRANSRECTAL BIOPSY OF PROSTATE WITH ULTRASOUND GUIDANCE  10/19/2020    TRANSRECTAL BIOPSY OF PROSTATE WITH ULTRASOUND GUIDANCE N/A 10/19/2020    Procedure: BIOPSY, PROSTATE, RECTAL APPROACH, WITH US GUIDANCE;  Surgeon: David Portillo MD;  Location: [facility];  Service: Urology;  Laterality: N/A;     Family History       Problem Relation (Age of Onset)    Cancer Father    Diabetes Mother    Hypertension Mother    Lung cancer Maternal Uncle          Tobacco Use    Smoking status: Never    Smokeless tobacco: Never   Substance and  "Sexual Activity    Alcohol use: No    Drug use: No    Sexual activity: Not on file        Review of Systems   Constitutional:  Negative for activity change, chills and fever.   HENT: Negative.     Eyes: Negative.    Respiratory:  Negative for shortness of breath.    Cardiovascular:  Negative for leg swelling.   Gastrointestinal:  Negative for abdominal distention, abdominal pain, nausea and vomiting.   Endocrine: Negative.    Genitourinary:  Positive for decreased urine volume. Negative for difficulty urinating and hematuria.   Musculoskeletal: Negative.    Skin:  Negative for wound.   Neurological:  Negative for weakness.   Psychiatric/Behavioral:  Negative for behavioral problems and confusion. The patient is not nervous/anxious.      Objective:     Vital Signs (Most Recent):  Temp: 98.6 °F (37 °C) (03/05/25 0953)  Pulse: 102 (03/05/25 0953)  Resp: 18 (03/05/25 0953)  BP: 134/83 (03/05/25 0953)  SpO2: 95 % (03/05/25 0953)  Height: 5' 11" (180.3 cm)  Weight: 83.5 kg (184 lb 1.4 oz)  Body mass index is 25.67 kg/m².      Physical Exam  Vitals and nursing note reviewed.   Constitutional:       Appearance: Normal appearance.   Cardiovascular:      Rate and Rhythm: Normal rate.      Pulses: Normal pulses.   Pulmonary:      Effort: Pulmonary effort is normal.   Abdominal:      General: Bowel sounds are normal. There is no distension.      Palpations: Abdomen is soft.      Tenderness: There is no abdominal tenderness.   Musculoskeletal:         General: Normal range of motion.      Cervical back: Normal range of motion.      Right lower leg: No edema.      Left lower leg: No edema.   Skin:     General: Skin is warm and dry.   Neurological:      Mental Status: He is alert and oriented to person, place, and time. Mental status is at baseline.      Motor: No weakness.   Psychiatric:         Behavior: Behavior normal.         Thought Content: Thought content normal.          Pre op labs and imaging pending.   Assessment/Plan: "     Renal/  * ESRD on dialysis  - Admit for kidney transplant with Dr James  - OR time pending  - Pre op labs and imaging to be reviewed prior to surgery       The patient presents for kidney transplant.  There are no apparent contraindications to proceeding with the planned transplant.  The patient understands that the transplant could potentially be cancelled pending detailed assessment of the donor organ.  He will receive Thymoglobulin induction.  A complete discussion of the transplant procedure, including risks, complications, and alternatives, as well as any donor-specific risk factors requiring specific disclosure, will be carried out by the responsible staff surgeon prior to the procedure.     Discharge Planning: Not a candidate for d/c at this time.     Medical decision making for this encounter includes review of pertinent labs and diagnostic studies, assessment and planning, discussions with consulting providers, discussion with patient/family, and participation in multidisciplinary rounds. Time spent caring for patient: 60 minutes    Allie Douglas PA-C  Kidney Transplant  [facility] - Transplant Stepdown

## 2025-03-05 NOTE — PLAN OF CARE
55 year-old male admitted 3/5/25 for kidney txp. Pt has hx of HTN, ESRD, HD MWF, last HD 3/3  -AAOx4, ambulates independently  -20 G Rt FA  -Lt AV fistula +/+  -ramires in place  -D5 1/2 NS & NS infusing  -I=O's hourly  -Accuchecks AC/HS  -awaiting pt arrival from PACU  -PRN Oxy 5 mg Q6

## 2025-03-05 NOTE — SUBJECTIVE & OBJECTIVE
Interval HPI:   Overnight events: No acute events overnight. Patient in room NOM 2ND FLR Periop Pool*. Blood glucose stable. BG at goal on current insulin regimen (SSI ). Steroid use- Methylprednisolone  500 mg (pending induction dose). * Day of Surgery *  Renal function- Abnormal - on HD Creatinine 10.7   Vasopressors-  None       Endocrine will continue to follow and manage insulin orders inpatient.         Diet NPO Except for: Sips with Medication     Eating:   NPO  Nausea: No  Hypoglycemia and intervention: No  Fever: No  TPN and/or TF: No    PMH, PSH, FH, SH updated and reviewed     ROS:  Review of Systems   Constitutional:  Negative for unexpected weight change.   Eyes:  Negative for visual disturbance.   Respiratory:  Negative for cough.    Cardiovascular:  Negative for chest pain.   Gastrointestinal:  Negative for nausea and vomiting.   Endocrine: Negative for polydipsia and polyuria.   Musculoskeletal:  Negative for back pain.   Skin:  Negative for rash.   Neurological:  Negative for syncope.   Psychiatric/Behavioral:  Negative for agitation and dysphoric mood.        Current Medications and/or Treatments Impacting Glycemic Control  Immunotherapy:    Immunosuppressants           Stop Route Frequency     antithymocyte globulin (rabbit) 175 mg, hydrocortisone sodium succinate (SOLU-CORTEF) 20 mg in 0.9% NaCl 500 mL (FOR PERIPHERAL LINE ADMINISTRATION ONLY)         -- IV Once          Steroids:   Hormones (From admission, onward)      Start     Stop Route Frequency Ordered    03/05/25 1100  antithymocyte globulin (rabbit) 175 mg, hydrocortisone sodium succinate (SOLU-CORTEF) 20 mg in 0.9% NaCl 500 mL (FOR PERIPHERAL LINE ADMINISTRATION ONLY)         -- IV Once 03/05/25 0956    03/05/25 1000  methylPREDNISolone sodium succinate (SOLU-MEDROL) 500 mg in 0.9% NaCl 100 mL IVPB  (IP TXP INTRA-OP THYMO - PERIPHERAL THYMO PRECHECKED KIDNEY)         -- IV Once 03/05/25 0952          Pressors:    Autonomic Drugs (From  admission, onward)      None          Hyperglycemia/Diabetes Medications:   Antihyperglycemics (From admission, onward)      None             PHYSICAL EXAMINATION:  Vitals:    03/05/25 1331   BP:    Pulse: 91   Resp:    Temp:      Body mass index is 25.67 kg/m².     Physical Exam  Constitutional:       Appearance: He is well-developed.   HENT:      Head: Normocephalic.   Eyes:      Conjunctiva/sclera: Conjunctivae normal.   Pulmonary:      Effort: Pulmonary effort is normal.   Musculoskeletal:         General: Normal range of motion.   Skin:     General: Skin is warm.      Findings: No rash.   Neurological:      Mental Status: He is alert and oriented to person, place, and time.

## 2025-03-05 NOTE — ANESTHESIA PROCEDURE NOTES
Arterial    Diagnosis: ESRD    Patient location during procedure: done in OR    Staffing  Authorizing Provider: Davida Muro MD  Performing Provider: Maxx Morales MD    Staffing  Performed by: Maxx Morales MD  Authorized by: Davida Muro MD    Anesthesiologist was present at the time of the procedure.    Preanesthetic Checklist  Completed: patient identified, IV checked, site marked, risks and benefits discussed, surgical consent, monitors and equipment checked, pre-op evaluation, timeout performed and anesthesia consent givenArterial  Skin Prep: chlorhexidine gluconate  Local Infiltration: none  Orientation: right  Location: radial    Catheter Size: 20 G  Catheter placement by Ultrasound guidance. Heme positive aspiration all ports.   Vessel Caliber: medium, patent, compressibility normal  Needle advanced into vessel with real time Ultrasound guidance.Insertion Attempts: 1  Assessment  Dressing: secured with tape and tegaderm  Patient: Tolerated well

## 2025-03-05 NOTE — ASSESSMENT & PLAN NOTE
Titrate insulin slowly to avoid hypoglycemia as the risk of hypoglycemia increases with decreased creatinine clearance.  Estimated Creatinine Clearance: 8.3 mL/min (A) (based on SCr of 10.7 mg/dL (H)).  Pending kidney transplant  Glucocorticoids markedly increase glucose levels. Expect the steroid taper will help glucose control.

## 2025-03-05 NOTE — PROGRESS NOTES
Pt arrived to TSU accompanied by sister. Team notified of pt's arrival. Anes consulted. Vitals/assessment complete. Pt acclimated to room and call light.

## 2025-03-05 NOTE — TELEPHONE ENCOUNTER
ON-CALL NOTE    UNOS# [id number]    Patient notified of plan for final admission and states understanding.  Patient will be made NPO at 6.52 am. Patient plans to arrive at the hospital for 9-9:30 AM on 03/05/25. Patient verbalized understanding and all questions answered. All interested parties notified of patient's arrival. Dialysis unit notified-spoke with PRIYANKA Ly.

## 2025-03-05 NOTE — ANESTHESIA PROCEDURE NOTES
Intubation    Date/Time: 3/5/2025 2:31 PM    Performed by: Maxx Morales MD  Authorized by: Davida Muro MD    Intubation:     Induction:  Intravenous    Intubated:  Postinduction    Mask Ventilation:  Easy with oral airway    Attempts:  1    Attempted By:  Resident anesthesiologist    Method of Intubation:  Direct    Blade:  Lyle 3    Laryngeal View Grade: Grade I - full view of cords      Difficult Airway Encountered?: No      Complications:  None    Airway Device:  Oral endotracheal tube    Airway Device Size:  7.5    Style/Cuff Inflation:  Cuffed (inflated to minimal occlusive pressure)    Tube secured:  22    Secured at:  The lips    Placement Verified By:  Capnometry and Revisualization with laryngoscopy    Complicating Factors:  None    Findings Post-Intubation:  BS equal bilateral

## 2025-03-05 NOTE — HPI
Mr. Owens is a 54 y.o. year old Black or  male with ESRD secondary to HTN presenting as primary for kidney transplant. He has been on the wait list for a kidney transplant at [facility] since 4/1/2020. Patient is currently on hemodialysis started on 4/1/2020. Patient is dialyzing on MWF schedule. Patient reports that he is tolerating dialysis well. EDW 83.5 kg. He has a LUE AV fistula. Patient denies any recent hospitalizations or ED visits. Dr James will be the primary surgeon. Pre op labs and imaging will be reviewed prior to surgery.

## 2025-03-05 NOTE — NURSING
On Call Note  [facility] # [id number]    03/04/25 @ 0800 -Received case from Naa Hutchins RN    1803: Donor OR has been pushed back to 1900 and patient remains backup at this time. Phoned patient, several attempts, no answer.     1928: Phoned patient to provide update, no answer.

## 2025-03-05 NOTE — PLAN OF CARE
Patient arrived to unit from hospital floor via wheelchair. Pre procedure completed. Perioperative period discussed, all questions and concerns addressed.

## 2025-03-05 NOTE — ASSESSMENT & PLAN NOTE
- Admit for kidney transplant with Dr James  - OR time pending  - Pre op labs and imaging to be reviewed prior to surgery

## 2025-03-05 NOTE — ANESTHESIA PREPROCEDURE EVALUATION
03/05/2025  Misael Owens is a 55 y.o., male.    Procedure: TRANSPLANT, KIDNEY (Abdomen)   Anesthesia type: General   Diagnosis: ESRD on hemodialysis [N18.6, Z99.2]       Pre-operative evaluation for Procedure(s) (LRB):  TRANSPLANT, KIDNEY (N/A)      Encounter Diagnoses   Name Primary?    ESRD (end stage renal disease)     Pre-op evaluation     ESRD on dialysis Yes       Review of patient's allergies indicates:   Allergen Reactions    Lisinopril Swelling     Facial swelling       Medications Prior to Admission   Medication Sig Dispense Refill Last Dose/Taking    acetaminophen (TYLENOL) 325 MG tablet Take 325 mg by mouth every 6 (six) hours as needed for Pain. Takes 1-2 tablets prn       calcitRIOL (ROCALTROL) 0.5 MCG Cap Take 1 capsule (0.5 mcg total) by mouth once daily. 30 capsule 3     ergocalciferol (ERGOCALCIFEROL) 50,000 unit Cap Take 1 capsule (50,000 Units total) by mouth every 7 days. 12 capsule 3     ergocalciferol (ERGOCALCIFEROL) 50,000 unit Cap Take 1 capsule (50,000 Units total) by mouth every 7 days. 12 capsule 3     furosemide (LASIX) 40 MG tablet Take 1 tablet (40 mg total) by mouth once daily. 90 tablet 3     furosemide (LASIX) 40 MG tablet Take 1 tablet (40 mg total) by mouth once daily. 90 tablet 3     labetaloL (NORMODYNE) 200 MG tablet Take 1 tablet (200 mg total) by mouth every 12 (twelve) hours. 60 tablet 4     labetaloL (NORMODYNE) 200 MG tablet Take 1 tablet (200 mg total) by mouth every 12 (twelve) hours. 60 tablet 4     RENVELA 800 mg Tab Take 1 tablet (800 mg total) by mouth 4 (four) times daily. 120 tablet 3             Current Facility-Administered Medications on File Prior to Encounter   Medication Dose Route Frequency Provider Last Rate Last Admin    ceFAZolin injection 2 g  2 g Intravenous On Call Procedure Cherry Ocampo MD        lidocaine (PF) 10 mg/ml (1%) injection 10  mg  1 mL Intradermal Once Jay Sherman MD        mupirocin 2 % ointment   Nasal On Call Procedure Cherry Ocampo MD   Given at 03/02/20 0854    ondansetron injection 4 mg  4 mg Intravenous Q12H PRN Jay Sherman MD        sodium chloride 0.9% flush 10 mL  10 mL Intravenous PRN Cherry Ocampo MD         Current Outpatient Medications on File Prior to Encounter   Medication Sig Dispense Refill    acetaminophen (TYLENOL) 325 MG tablet Take 325 mg by mouth every 6 (six) hours as needed for Pain. Takes 1-2 tablets prn      calcitRIOL (ROCALTROL) 0.5 MCG Cap Take 1 capsule (0.5 mcg total) by mouth once daily. 30 capsule 3    ergocalciferol (ERGOCALCIFEROL) 50,000 unit Cap Take 1 capsule (50,000 Units total) by mouth every 7 days. 12 capsule 3    ergocalciferol (ERGOCALCIFEROL) 50,000 unit Cap Take 1 capsule (50,000 Units total) by mouth every 7 days. 12 capsule 3    furosemide (LASIX) 40 MG tablet Take 1 tablet (40 mg total) by mouth once daily. 90 tablet 3    furosemide (LASIX) 40 MG tablet Take 1 tablet (40 mg total) by mouth once daily. 90 tablet 3    labetaloL (NORMODYNE) 200 MG tablet Take 1 tablet (200 mg total) by mouth every 12 (twelve) hours. 60 tablet 4    labetaloL (NORMODYNE) 200 MG tablet Take 1 tablet (200 mg total) by mouth every 12 (twelve) hours. 60 tablet 4    RENVELA 800 mg Tab Take 1 tablet (800 mg total) by mouth 4 (four) times daily. 120 tablet 3       Past Medical History:  No date: CKD (chronic kidney disease), stage IV      Comment:  due to hypertension  No date: ESRD on dialysis  No date: Hypertension  No date: Secondary hyperparathyroidism  No date: Vitamin D deficiency    Past Surgical History:   Procedure Laterality Date    AV FISTULA PLACEMENT Left 1/7/2020    Procedure: CREATION, AV FISTULA, brachiobasillic;  Surgeon: Derrek Grant MD;  Location: [facility] OR [address] [place] [address];  Service: Peripheral Vascular;  Laterality: Left;    COLONOSCOPY N/A 10/26/2020    Procedure: COLONOSCOPY;   "Surgeon: Zeferino Doyle MD;  Location: [facility] ENDO (4TH FLR);  Service: Endoscopy;  Laterality: N/A;  dialysis Pt - Labs   Rapid    FISTULOGRAM Left 2/4/2020    Procedure: Fistulogram;  Surgeon: Derrek Grant MD;  Location: [facility] OR 2ND FLR;  Service: Peripheral Vascular;  Laterality: Left;  Fluro: 2.7 min  mGy: 23.74  contrast : 17ml    INSERTION OF DIALYSIS CATHETER  3/31/2020    Procedure: INSERTION, CATHETER, DIALYSIS;  Surgeon: Lm Espinosa MD;  Location: [facility];  Service: General;;    INSERTION OF DIALYSIS CATHETER      RENAL BIOPSY      TRANSPOSITION OF BASILIC VEIN Left 3/2/2020    Procedure: TRANSPOSITION, VEIN, BASILIC;  Surgeon: Derrek Grant MD;  Location: [facility] OR 2ND FLR;  Service: Peripheral Vascular;  Laterality: Left;    TRANSRECTAL BIOPSY OF PROSTATE WITH ULTRASOUND GUIDANCE  10/19/2020    TRANSRECTAL BIOPSY OF PROSTATE WITH ULTRASOUND GUIDANCE N/A 10/19/2020    Procedure: BIOPSY, PROSTATE, RECTAL APPROACH, WITH US GUIDANCE;  Surgeon: David Portillo MD;  Location: [facility];  Service: Urology;  Laterality: N/A;       Social History     Tobacco Use   Smoking Status Never   Smokeless Tobacco Never       Social History     Substance and Sexual Activity   Alcohol Use No       Physical Activity: Not on file       No birth history on file.  No results for input(s): "HCT" in the last 72 hours.  No results for input(s): "PLT" in the last 72 hours.  No results for input(s): "K" in the last 72 hours.  No results for input(s): "CREATININE" in the last 72 hours.  No results for input(s): "GLU" in the last 72 hours.  No results for input(s): "PT" in the last 72 hours.  Vitals:    03/05/25 0953   BP: 134/83   BP Location: Right arm   Patient Position: Sitting   Pulse: 102   Resp: 18   Temp: 37 °C (98.6 °F)   TempSrc: Oral   SpO2: 95%   Weight: 83.5 kg (184 lb 1.4 oz)   Height: 5' 11" (1.803 m)                       Pre-op Assessment          Review of Systems  Anesthesia Hx:  No problems with " previous Anesthesia                Hematology/Oncology:  Hematology Normal   Oncology Normal                                   Cardiovascular:     Denies Pacemaker. Hypertension, well controlled   Denies MI.     Denies CABG/stent.    Denies Angina.         Walks 2 1/2 miles most days,                            Pulmonary:  Pulmonary Normal   Denies COPD.  Denies Asthma.   Denies Shortness of breath.   Denies Sleep Apnea.                Renal/:  Chronic Renal Disease, ESRD, Dialysis   M,W,FR,  last was Monday, feels in good fluid balance now.  LUE fistula             Hepatic/GI:      Denies Liver Disease.               Neurological:  Denies TIA.  Denies CVA.    Denies Seizures.                                Endocrine:  Denies Diabetes.               Physical Exam  General: Well nourished, Cooperative, Alert and Oriented    Airway:  Mallampati: III   Mouth Opening: Normal  TM Distance: Normal  Tongue: Normal  Neck ROM: Normal ROM        Anesthesia Plan  Type of Anesthesia, risks & benefits discussed:    Anesthesia Type: Gen ETT  Intra-op Monitoring Plan: Standard ASA Monitors and Art Line  Post Op Pain Control Plan: multimodal analgesia and IV/PO Opioids PRN  Induction:  IV  Informed Consent: Informed consent signed with the Patient and all parties understand the risks and agree with anesthesia plan.  All questions answered.   ASA Score: 4  Day of Surgery Review of History & Physical: H&P Update referred to the surgeon/provider.    Ready For Surgery From Anesthesia Perspective.     .  12/24 spect    Normal myocardial perfusion scan. There is no evidence of myocardial ischemia or infarction.    There is a fixed perfusion abnormality in the inferolateral wall due to artifact from nearby bowel.    The gated perfusion images showed an ejection fraction of 65% at rest. The gated perfusion images showed an ejection fraction of 68% post stress. Normal ejection fraction is greater than 47%.    There is normal wall motion  at rest and post-stress.    LV cavity size is normal at rest and normal at post-stress.    The ECG portion of the study is negative for ischemia.    The patient reported no chest pain during the stress test.    There were no arrhythmias during stress.    When compared to a prior study from 8/18/2022, there are no significant changes.      12/2024    Left Ventricle: The left ventricle is normal in size. Normal wall thickness. Normal wall motion. There is normal systolic function with a visually estimated ejection fraction of 60 - 65%. There is indeterminate diastolic function.    Right Ventricle: Normal right ventricular cavity size. Right ventricle wall motion  is normal. Systolic function is normal.    Left Atrium: Left atrium is severely dilated.    Right Atrium: Right atrium is dilated.    Aortic Valve: There is mild aortic valve sclerosis.    Mitral Valve: There is mild regurgitation.    Tricuspid Valve: There is mild regurgitation.    Pulmonic Valve: There is mild regurgitation.    Pulmonary Artery: No pulmonary hypertension. The estimated pulmonary artery systolic pressure is 21 mmHg.    IVC/SVC: Normal venous pressure at 3 mmHg.    Pericardium: There is no pericardial effusion.        EKG-PENDING

## 2025-03-05 NOTE — CONSULTS
[facility] - Surgery ([facility])  Endocrinology  Diabetes Consult Note    Consult Requested by: Allie James MD   Reason for admit: ESRD on dialysis    HISTORY OF PRESENT ILLNESS:  Reason for Consult: Management of Hyperglycemia     Surgical Procedure and Date: pending kidney transplant on 03/05/2025    Patient is not diabetic and does not currently take any oral/injectable antidiabetic/hypoglycemic medications.   Hemoglobin A1C   Date Value Ref Range Status   12/18/2024 5.1 4.8 - 5.9 % Final   11/15/2019 5.2 4.0 - 5.6 % Final     Comment:     ADA Screening Guidelines:  5.7-6.4%  Consistent with prediabetes  >or=6.5%  Consistent with diabetes  High levels of fetal hemoglobin interfere with the HbA1C  assay. Heterozygous hemoglobin variants (HbS, HgC, etc)do  not significantly interfere with this assay.   However, presence of multiple variants may affect accuracy.            HPI: Mr. Owens is a 54 y.o. year old Black or  male with ESRD secondary to HTN presenting as primary for kidney transplant. Endocrine consulted to manage hyperglycemia in the context of high-dose steroids.           Interval HPI:   Overnight events: No acute events overnight. Patient in room [address] Periop Pool*. Blood glucose stable. BG at goal on current insulin regimen (SSI ). Steroid use- Methylprednisolone  500 mg (pending induction dose). * Day of Surgery *  Renal function- Abnormal - on HD Creatinine 10.7   Vasopressors-  None       Endocrine will continue to follow and manage insulin orders inpatient.         Diet NPO Except for: Sips with Medication     Eating:   NPO  Nausea: No  Hypoglycemia and intervention: No  Fever: No  TPN and/or TF: No    PMH, PSH, FH, SH updated and reviewed     ROS:  Review of Systems   Constitutional:  Negative for unexpected weight change.   Eyes:  Negative for visual disturbance.   Respiratory:  Negative for cough.    Cardiovascular:  Negative for chest pain.   Gastrointestinal:   Negative for nausea and vomiting.   Endocrine: Negative for polydipsia and polyuria.   Musculoskeletal:  Negative for back pain.   Skin:  Negative for rash.   Neurological:  Negative for syncope.   Psychiatric/Behavioral:  Negative for agitation and dysphoric mood.        Current Medications and/or Treatments Impacting Glycemic Control  Immunotherapy:    Immunosuppressants           Stop Route Frequency     antithymocyte globulin (rabbit) 175 mg, hydrocortisone sodium succinate (SOLU-CORTEF) 20 mg in 0.9% NaCl 500 mL (FOR PERIPHERAL LINE ADMINISTRATION ONLY)         -- IV Once          Steroids:   Hormones (From admission, onward)      Start     Stop Route Frequency Ordered    03/05/25 1100  antithymocyte globulin (rabbit) 175 mg, hydrocortisone sodium succinate (SOLU-CORTEF) 20 mg in 0.9% NaCl 500 mL (FOR PERIPHERAL LINE ADMINISTRATION ONLY)         -- IV Once 03/05/25 0956    03/05/25 1000  methylPREDNISolone sodium succinate (SOLU-MEDROL) 500 mg in 0.9% NaCl 100 mL IVPB  (IP TXP INTRA-OP THYMO - PERIPHERAL THYMO PRECHECKED KIDNEY)         -- IV Once 03/05/25 0952          Pressors:    Autonomic Drugs (From admission, onward)      None          Hyperglycemia/Diabetes Medications:   Antihyperglycemics (From admission, onward)      None             PHYSICAL EXAMINATION:  Vitals:    03/05/25 1331   BP:    Pulse: 91   Resp:    Temp:      Body mass index is 25.67 kg/m².     Physical Exam  Constitutional:       Appearance: He is well-developed.   HENT:      Head: Normocephalic.   Eyes:      Conjunctiva/sclera: Conjunctivae normal.   Pulmonary:      Effort: Pulmonary effort is normal.   Musculoskeletal:         General: Normal range of motion.   Skin:     General: Skin is warm.      Findings: No rash.   Neurological:      Mental Status: He is alert and oriented to person, place, and time.            Labs Reviewed and Include   Recent Labs   Lab 03/05/25  1010   GLU 85   CALCIUM 9.3   ALBUMIN 4.0   PROT 8.1      K  "3.8   CO2 22*      BUN 43*   CREATININE 10.7*   ALKPHOS 65   ALT 9*   AST 15   BILITOT 0.7     Lab Results   Component Value Date    WBC 5.64 03/05/2025    HGB 10.7 (L) 03/05/2025    HCT 32.6 (L) 03/05/2025    MCV 92 03/05/2025     03/05/2025     No results for input(s): "TSH", "FREET4" in the last 168 hours.  Lab Results   Component Value Date    HGBA1C 5.1 12/18/2024       Nutritional status:   Body mass index is 25.67 kg/m².  Lab Results   Component Value Date    ALBUMIN 4.0 03/05/2025    ALBUMIN 4.4 01/15/2021    ALBUMIN 3.8 05/06/2020     No results found for: "PREALBUMIN"    Estimated Creatinine Clearance: 8.3 mL/min (A) (based on SCr of 10.7 mg/dL (H)).    Accu-Checks  No results for input(s): "POCTGLUCOSE" in the last 72 hours.     ASSESSMENT and PLAN    Cardiac/Vascular  Benign hypertension with ESRD (end-stage renal disease)  Uncontrolled HTN can worsen insulin resistance.         Renal/  * ESRD on dialysis  Titrate insulin slowly to avoid hypoglycemia as the risk of hypoglycemia increases with decreased creatinine clearance.  Estimated Creatinine Clearance: 8.3 mL/min (A) (based on SCr of 10.7 mg/dL (H)).  Pending kidney transplant  Glucocorticoids markedly increase glucose levels. Expect the steroid taper will help glucose control.         Endocrine  Steroid-induced hyperglycemia  Endocrinology consulted for BG management.   BG goal 140-180    - Novolog (Insulin Aspart) prn for BG excursions [facility] [place] (150/25)  - BG checks AC/HS  - Hypoglycemia protocol in place    ** Please notify Endocrine for any change and/or advance in diet**  ** Please call Endocrine for any BG related issues **    Discharge Planning:   TBD. Please notify endocrinology prior to discharge.            Plan discussed with patient, family, and RN at bedside.        Kingsley Solomon, DNP, FNP  Endocrinology  [facility] - Surgery (2nd Fl)  "

## 2025-03-05 NOTE — TRANSFER OF CARE
"Anesthesia Transfer of Care Note    Patient: Misael Owens    Procedure(s) Performed: Procedure(s) (LRB):  TRANSPLANT, KIDNEY (N/A)    Patient location: PACU    Anesthesia Type: general    Transport from OR: Transported from OR on 6-10 L/min O2 by face mask with adequate spontaneous ventilation    Post pain: adequate analgesia    Post assessment: no apparent anesthetic complications and tolerated procedure well    Post vital signs: stable    Level of consciousness: awake and alert    Nausea/Vomiting: no nausea/vomiting    Complications: none    Transfer of care protocol was followed      Last vitals: Visit Vitals  /76 (BP Location: Right arm, Patient Position: Sitting)   Pulse 91   Temp 36.7 °C (98.1 °F) (Temporal)   Resp 16   Ht 5' 11" (1.803 m)   Wt 83.5 kg (184 lb 1.4 oz)   SpO2 100%   BMI 25.67 kg/m²     "

## 2025-03-05 NOTE — ASSESSMENT & PLAN NOTE
Endocrinology consulted for BG management.   BG goal 140-180    - Novolog (Insulin Aspart) prn for BG excursions [facility] SSI (150/25)  - BG checks AC/HS  - Hypoglycemia protocol in place    ** Please notify Endocrine for any change and/or advance in diet**  ** Please call Endocrine for any BG related issues **    Discharge Planning:   TBD. Please notify endocrinology prior to discharge.

## 2025-03-05 NOTE — HPI
Reason for Consult: Management of Hyperglycemia     Surgical Procedure and Date: pending kidney transplant on 03/05/2025    Patient is not diabetic and does not currently take any oral/injectable antidiabetic/hypoglycemic medications.   Hemoglobin A1C   Date Value Ref Range Status   12/18/2024 5.1 4.8 - 5.9 % Final   11/15/2019 5.2 4.0 - 5.6 % Final     Comment:     ADA Screening Guidelines:  5.7-6.4%  Consistent with prediabetes  >or=6.5%  Consistent with diabetes  High levels of fetal hemoglobin interfere with the HbA1C  assay. Heterozygous hemoglobin variants (HbS, HgC, etc)do  not significantly interfere with this assay.   However, presence of multiple variants may affect accuracy.            HPI: Mr. Owens is a 54 y.o. year old Black or  male with ESRD secondary to HTN presenting as primary for kidney transplant. Endocrine consulted to manage hyperglycemia in the context of high-dose steroids.

## 2025-03-06 DIAGNOSIS — Z94.0 KIDNEY REPLACED BY TRANSPLANT: Primary | ICD-10-CM

## 2025-03-06 PROBLEM — Z91.89 AT RISK FOR OPPORTUNISTIC INFECTIONS: Status: ACTIVE | Noted: 2025-03-06

## 2025-03-06 PROBLEM — Z79.60 LONG-TERM USE OF IMMUNOSUPPRESSANT MEDICATION: Status: ACTIVE | Noted: 2025-03-06

## 2025-03-06 PROBLEM — Z29.89 PROPHYLACTIC IMMUNOTHERAPY: Status: ACTIVE | Noted: 2025-03-06

## 2025-03-06 LAB
ALBUMIN SERPL BCP-MCNC: 2.9 G/DL (ref 3.5–5.2)
ANION GAP SERPL CALC-SCNC: 10 MMOL/L (ref 8–16)
BASOPHILS # BLD AUTO: 0 K/UL (ref 0–0.2)
BASOPHILS # BLD AUTO: 0.01 K/UL (ref 0–0.2)
BASOPHILS NFR BLD: 0 % (ref 0–1.9)
BASOPHILS NFR BLD: 0.1 % (ref 0–1.9)
BUN SERPL-MCNC: 43 MG/DL (ref 6–20)
CALCIUM SERPL-MCNC: 7.5 MG/DL (ref 8.7–10.5)
CHLORIDE SERPL-SCNC: 106 MMOL/L (ref 95–110)
CO2 SERPL-SCNC: 19 MMOL/L (ref 23–29)
CREAT SERPL-MCNC: 8.1 MG/DL (ref 0.5–1.4)
DIFFERENTIAL METHOD BLD: ABNORMAL
DIFFERENTIAL METHOD BLD: ABNORMAL
EOSINOPHIL # BLD AUTO: 0 K/UL (ref 0–0.5)
EOSINOPHIL # BLD AUTO: 0 K/UL (ref 0–0.5)
EOSINOPHIL NFR BLD: 0 % (ref 0–8)
EOSINOPHIL NFR BLD: 0 % (ref 0–8)
ERYTHROCYTE [DISTWIDTH] IN BLOOD BY AUTOMATED COUNT: 14.3 % (ref 11.5–14.5)
ERYTHROCYTE [DISTWIDTH] IN BLOOD BY AUTOMATED COUNT: 14.6 % (ref 11.5–14.5)
EST. GFR  (NO RACE VARIABLE): 7.2 ML/MIN/1.73 M^2
FERRITIN SERPL-MCNC: 5191 NG/ML (ref 20–300)
GLUCOSE SERPL-MCNC: 338 MG/DL (ref 70–110)
HCT VFR BLD AUTO: 25 % (ref 40–54)
HCT VFR BLD AUTO: 26.7 % (ref 40–54)
HCV RNA SERPL QL NAA+PROBE: NOT DETECTED
HCV RNA SPEC NAA+PROBE-ACNC: NOT DETECTED IU/ML
HGB BLD-MCNC: 7.9 G/DL (ref 14–18)
HGB BLD-MCNC: 8.7 G/DL (ref 14–18)
IMM GRANULOCYTES # BLD AUTO: 0.04 K/UL (ref 0–0.04)
IMM GRANULOCYTES # BLD AUTO: 0.19 K/UL (ref 0–0.04)
IMM GRANULOCYTES NFR BLD AUTO: 0.4 % (ref 0–0.5)
IMM GRANULOCYTES NFR BLD AUTO: 1.6 % (ref 0–0.5)
IRON SERPL-MCNC: 12 UG/DL (ref 45–160)
LYMPHOCYTES # BLD AUTO: 0 K/UL (ref 1–4.8)
LYMPHOCYTES # BLD AUTO: 0 K/UL (ref 1–4.8)
LYMPHOCYTES NFR BLD: 0.2 % (ref 18–48)
LYMPHOCYTES NFR BLD: 0.2 % (ref 18–48)
MAGNESIUM SERPL-MCNC: 1.5 MG/DL (ref 1.6–2.6)
MCH RBC QN AUTO: 28.9 PG (ref 27–31)
MCH RBC QN AUTO: 29.9 PG (ref 27–31)
MCHC RBC AUTO-ENTMCNC: 31.6 G/DL (ref 32–36)
MCHC RBC AUTO-ENTMCNC: 32.6 G/DL (ref 32–36)
MCV RBC AUTO: 92 FL (ref 82–98)
MCV RBC AUTO: 92 FL (ref 82–98)
MONOCYTES # BLD AUTO: 0.2 K/UL (ref 0.3–1)
MONOCYTES # BLD AUTO: 0.3 K/UL (ref 0.3–1)
MONOCYTES NFR BLD: 1.8 % (ref 4–15)
MONOCYTES NFR BLD: 2.5 % (ref 4–15)
NEUTROPHILS # BLD AUTO: 10.6 K/UL (ref 1.8–7.7)
NEUTROPHILS # BLD AUTO: 11.5 K/UL (ref 1.8–7.7)
NEUTROPHILS NFR BLD: 95.6 % (ref 38–73)
NEUTROPHILS NFR BLD: 97.6 % (ref 38–73)
NRBC BLD-RTO: 0 /100 WBC
NRBC BLD-RTO: 0 /100 WBC
PHOSPHATE SERPL-MCNC: 4.3 MG/DL (ref 2.7–4.5)
PLATELET # BLD AUTO: 150 K/UL (ref 150–450)
PLATELET # BLD AUTO: 156 K/UL (ref 150–450)
PMV BLD AUTO: 9.4 FL (ref 9.2–12.9)
PMV BLD AUTO: 9.5 FL (ref 9.2–12.9)
POCT GLUCOSE: 120 MG/DL (ref 70–110)
POCT GLUCOSE: 126 MG/DL (ref 70–110)
POCT GLUCOSE: 153 MG/DL (ref 70–110)
POCT GLUCOSE: 161 MG/DL (ref 70–110)
POTASSIUM SERPL-SCNC: 3.7 MMOL/L (ref 3.5–5.1)
RBC # BLD AUTO: 2.73 M/UL (ref 4.6–6.2)
RBC # BLD AUTO: 2.91 M/UL (ref 4.6–6.2)
SATURATED IRON: 7 % (ref 20–50)
SODIUM SERPL-SCNC: 135 MMOL/L (ref 136–145)
TACROLIMUS BLD-MCNC: 3.7 NG/ML (ref 5–15)
TOTAL IRON BINDING CAPACITY: 182 UG/DL (ref 250–450)
TRANSFERRIN SERPL-MCNC: 123 MG/DL (ref 200–375)
WBC # BLD AUTO: 10.87 K/UL (ref 3.9–12.7)
WBC # BLD AUTO: 11.99 K/UL (ref 3.9–12.7)

## 2025-03-06 PROCEDURE — 36415 COLL VENOUS BLD VENIPUNCTURE: CPT

## 2025-03-06 PROCEDURE — 63600175 PHARM REV CODE 636 W HCPCS: Performed by: PHYSICIAN ASSISTANT

## 2025-03-06 PROCEDURE — 25000003 PHARM REV CODE 250

## 2025-03-06 PROCEDURE — 83540 ASSAY OF IRON: CPT

## 2025-03-06 PROCEDURE — 99233 SBSQ HOSP IP/OBS HIGH 50: CPT | Mod: 24,,, | Performed by: PHYSICIAN ASSISTANT

## 2025-03-06 PROCEDURE — 80197 ASSAY OF TACROLIMUS: CPT

## 2025-03-06 PROCEDURE — 25000003 PHARM REV CODE 250: Performed by: PHYSICIAN ASSISTANT

## 2025-03-06 PROCEDURE — 85025 COMPLETE CBC W/AUTO DIFF WBC: CPT | Performed by: TRANSPLANT SURGERY

## 2025-03-06 PROCEDURE — 99232 SBSQ HOSP IP/OBS MODERATE 35: CPT | Mod: ,,, | Performed by: NURSE PRACTITIONER

## 2025-03-06 PROCEDURE — 63600175 PHARM REV CODE 636 W HCPCS: Mod: JZ,TB

## 2025-03-06 PROCEDURE — 85025 COMPLETE CBC W/AUTO DIFF WBC: CPT | Mod: 91

## 2025-03-06 PROCEDURE — 36415 COLL VENOUS BLD VENIPUNCTURE: CPT | Performed by: TRANSPLANT SURGERY

## 2025-03-06 PROCEDURE — 80069 RENAL FUNCTION PANEL: CPT

## 2025-03-06 PROCEDURE — 27000207 HC ISOLATION

## 2025-03-06 PROCEDURE — 20600001 HC STEP DOWN PRIVATE ROOM

## 2025-03-06 PROCEDURE — 83735 ASSAY OF MAGNESIUM: CPT

## 2025-03-06 PROCEDURE — 82728 ASSAY OF FERRITIN: CPT

## 2025-03-06 PROCEDURE — 63600175 PHARM REV CODE 636 W HCPCS: Performed by: NURSE PRACTITIONER

## 2025-03-06 RX ORDER — PREDNISONE 5 MG/1
TABLET ORAL
Qty: 75 TABLET | Refills: 11 | Status: SHIPPED | OUTPATIENT
Start: 2025-03-07

## 2025-03-06 RX ORDER — MYCOPHENOLATE MOFETIL 250 MG/1
1000 CAPSULE ORAL 2 TIMES DAILY
Qty: 240 CAPSULE | Refills: 11 | Status: SHIPPED | OUTPATIENT
Start: 2025-03-06 | End: 2026-03-06

## 2025-03-06 RX ORDER — SULFAMETHOXAZOLE AND TRIMETHOPRIM 400; 80 MG/1; MG/1
1 TABLET ORAL DAILY
Qty: 30 TABLET | Refills: 5 | Status: SHIPPED | OUTPATIENT
Start: 2025-03-06 | End: 2025-03-07

## 2025-03-06 RX ORDER — SODIUM BICARBONATE 650 MG/1
650 TABLET ORAL 2 TIMES DAILY
Status: DISCONTINUED | OUTPATIENT
Start: 2025-03-06 | End: 2025-03-07 | Stop reason: HOSPADM

## 2025-03-06 RX ORDER — SODIUM CHLORIDE 9 MG/ML
INJECTION, SOLUTION INTRAVENOUS CONTINUOUS
Status: ACTIVE | OUTPATIENT
Start: 2025-03-06 | End: 2025-03-06

## 2025-03-06 RX ORDER — FAMOTIDINE 20 MG/1
20 TABLET, FILM COATED ORAL NIGHTLY
Qty: 30 TABLET | Refills: 0 | Status: SHIPPED | OUTPATIENT
Start: 2025-03-06 | End: 2025-04-06

## 2025-03-06 RX ORDER — VALGANCICLOVIR 450 MG/1
450 TABLET, FILM COATED ORAL EVERY MORNING
Qty: 30 TABLET | Refills: 2 | Status: SHIPPED | OUTPATIENT
Start: 2025-03-06 | End: 2025-03-07

## 2025-03-06 RX ORDER — MAGNESIUM SULFATE HEPTAHYDRATE 40 MG/ML
2 INJECTION, SOLUTION INTRAVENOUS ONCE
Status: COMPLETED | OUTPATIENT
Start: 2025-03-06 | End: 2025-03-06

## 2025-03-06 RX ORDER — TACROLIMUS 1 MG/1
6 CAPSULE ORAL EVERY 12 HOURS
Qty: 360 CAPSULE | Refills: 11 | Status: SHIPPED | OUTPATIENT
Start: 2025-03-06 | End: 2025-03-07

## 2025-03-06 RX ADMIN — MUPIROCIN 1 G: 20 OINTMENT TOPICAL at 08:03

## 2025-03-06 RX ADMIN — CEFAZOLIN 2 G: 2 INJECTION, POWDER, FOR SOLUTION INTRAMUSCULAR; INTRAVENOUS at 05:03

## 2025-03-06 RX ADMIN — MAGNESIUM SULFATE HEPTAHYDRATE 2 G: 40 INJECTION, SOLUTION INTRAVENOUS at 09:03

## 2025-03-06 RX ADMIN — METHYLPREDNISOLONE SODIUM SUCCINATE 250 MG: 125 INJECTION, POWDER, FOR SOLUTION INTRAMUSCULAR; INTRAVENOUS at 02:03

## 2025-03-06 RX ADMIN — DOCUSATE SODIUM 100 MG: 100 CAPSULE, LIQUID FILLED ORAL at 09:03

## 2025-03-06 RX ADMIN — OXYCODONE 5 MG: 5 TABLET ORAL at 01:03

## 2025-03-06 RX ADMIN — SODIUM CHLORIDE 500 ML: 9 INJECTION, SOLUTION INTRAVENOUS at 01:03

## 2025-03-06 RX ADMIN — MUPIROCIN 1 G: 20 OINTMENT TOPICAL at 09:03

## 2025-03-06 RX ADMIN — ACETAMINOPHEN 650 MG: 325 TABLET ORAL at 08:03

## 2025-03-06 RX ADMIN — DOCUSATE SODIUM 100 MG: 100 CAPSULE, LIQUID FILLED ORAL at 02:03

## 2025-03-06 RX ADMIN — TACROLIMUS 3 MG: 1 CAPSULE ORAL at 09:03

## 2025-03-06 RX ADMIN — SODIUM CHLORIDE: 9 INJECTION, SOLUTION INTRAVENOUS at 09:03

## 2025-03-06 RX ADMIN — BISACODYL 10 MG: 5 TABLET, COATED ORAL at 08:03

## 2025-03-06 RX ADMIN — MYCOPHENOLATE MOFETIL 1000 MG: 250 CAPSULE ORAL at 08:03

## 2025-03-06 RX ADMIN — ACETAMINOPHEN 650 MG: 325 TABLET ORAL at 05:03

## 2025-03-06 RX ADMIN — DOCUSATE SODIUM 100 MG: 100 CAPSULE, LIQUID FILLED ORAL at 08:03

## 2025-03-06 RX ADMIN — INSULIN ASPART 1 UNITS: 100 INJECTION, SOLUTION INTRAVENOUS; SUBCUTANEOUS at 08:03

## 2025-03-06 RX ADMIN — OXYCODONE 5 MG: 5 TABLET ORAL at 09:03

## 2025-03-06 RX ADMIN — FAMOTIDINE 20 MG: 20 TABLET, FILM COATED ORAL at 08:03

## 2025-03-06 RX ADMIN — ACETAMINOPHEN 650 MG: 325 TABLET ORAL at 02:03

## 2025-03-06 RX ADMIN — THERA TABS 1 TABLET: TAB at 09:03

## 2025-03-06 RX ADMIN — HEPARIN SODIUM 5000 UNITS: 5000 INJECTION INTRAVENOUS; SUBCUTANEOUS at 02:03

## 2025-03-06 RX ADMIN — SODIUM BICARBONATE 650 MG TABLET 650 MG: at 09:03

## 2025-03-06 RX ADMIN — SODIUM CHLORIDE: 9 INJECTION, SOLUTION INTRAVENOUS at 02:03

## 2025-03-06 RX ADMIN — HEPARIN SODIUM 5000 UNITS: 5000 INJECTION INTRAVENOUS; SUBCUTANEOUS at 08:03

## 2025-03-06 RX ADMIN — SODIUM BICARBONATE 650 MG TABLET 650 MG: at 08:03

## 2025-03-06 RX ADMIN — TACROLIMUS 3 MG: 1 CAPSULE ORAL at 06:03

## 2025-03-06 RX ADMIN — ANTI-THYMOCYTE GLOBULIN (RABBIT) 175 MG: 5 INJECTION, POWDER, LYOPHILIZED, FOR SOLUTION INTRAVENOUS at 02:03

## 2025-03-06 RX ADMIN — HEPARIN SODIUM 5000 UNITS: 5000 INJECTION INTRAVENOUS; SUBCUTANEOUS at 05:03

## 2025-03-06 RX ADMIN — MYCOPHENOLATE MOFETIL 1000 MG: 250 CAPSULE ORAL at 09:03

## 2025-03-06 RX ADMIN — DIPHENHYDRAMINE HYDROCHLORIDE 25 MG: 25 CAPSULE ORAL at 01:03

## 2025-03-06 NOTE — HOSPITAL COURSE
Mr. Owens is now s/p DBD Ktx on 3/5/25 2/2 HTN (CIT 17h 16 min, CMV +/+). Straightforward care. 2 day ramires. No drains. Out of the OR ~1700 3/5. Pathway advanced POD#1. IVF discontinued as of POD#1 evening.     Interval History: POD#2 s/p kidney transplant. Ramires removed this AM, & pt is voiding without difficulty. Cr trending down with good UOP. H/H stable. (+) flatus (-) BM. Continue bowel regimen and encourage ambulation. Tolerating diet. No longer on IVF. Encourage oral hydration. Plan for discharge tomorrow morning. WCTM.

## 2025-03-06 NOTE — PROGRESS NOTES
TRANSPLANT NOTE:      ORGAN:   RIGHT KIDNEY    Disease Etiology: Malignant Hypertension  Donor Type:   Donation after Brain Death    [facility] High Risk:   No    Donor CMV Status:     Donor HBcAB:   Positive    Donor HCV Status:   Negative    Peak cPRA % (within 1 year of transplant): 24      Misael Owens is a 55 y.o. male s/p    Donation after Brain Death   kidney transplant on 3/5/2025 (Kidney) for Malignant Hypertension.   This patient will receive anti-thymocyte globulin 4.5 mg/kg for induction.  This patients maintenance immunosuppression will include a steroid taper per protocol to 5mg daily, tacrolimus, and mycophenolate maintenance.  Opportunistic infection prophylaxis will include valganciclovir for 3 months (CMV D + , R + ) and sulfamethoxazole-trimethoprim for 6 months.  Patient is to begin self medications upon transfer to the [facility], and I plan to meet with this patient and his/her support person prior to discharge to review the medication section of the Kidney Transplant Education Manual.  I have reviewed the pre-op medications and have restarted those, as appropriate.

## 2025-03-06 NOTE — ASSESSMENT & PLAN NOTE
Endocrinology consulted for BG management.   BG goal 140-180    - Novolog (Insulin Aspart) prn for BG excursions [facility] (150/25)  - BG checks AC/HS  - Hypoglycemia protocol in place    ** Please notify Endocrine for any change and/or advance in diet**  ** Please call Endocrine for any BG related issues **    Discharge Planning:   TBD. Please notify endocrinology prior to discharge.

## 2025-03-06 NOTE — PLAN OF CARE
Recommendations  1. Continue Regular, low phosphorus diet   2. RD following     Goals: Meet % of EEN/EPN by RD f/u date  Nutrition Goal Status: goal met  Communication of RD Recs: POC

## 2025-03-06 NOTE — PLAN OF CARE
55 year-old male admitted 3/5/25 for kidney txp. Pt has hx of HTN, ESRD, HD MWF, last HD 3/3  -AAOx4, ambulates w/standby assist  -20 G Rt FA  -18 G Rt hand  -Lt AV fistula +/+  -ramires in place, ramires to be removed 3/7 @ 0600   -D5 1/2 NS & NS discontinued  -NS @ 75ml/hr  -Thymo 2/2 infusing  -pt passing flatulence, no BM as of yet  -Accuchecks AC/HS, slides @ 151  -CBC 8.7/26.7  -Mg 1.5/Mg IVPB (1)  -Cr 8.1  -pt ambulated in halls this shift, pt sitting up in chair, wheels locked, non-skid socks in place, call light within reach  -self meds to be taught  -PRN Oxy 5 mg Q6

## 2025-03-06 NOTE — PROGRESS NOTES
Patient admitted for Kidney transplant.  Transplant coordinator met with the patient on rounds to introduce self and explain the coordinator role.     The post-transplant teaching book was given.   Transplant Coordinator explained that she will follow the patient while in the hospital and assist with discharge.

## 2025-03-06 NOTE — PROGRESS NOTES
[facility]  Kidney Transplant  Progress Note      Reason for Follow-up: Reassessment of Kidney Transplant - 3/5/2025  (#1) recipient and management of immunosuppression.    ORGAN:   RIGHT KIDNEY    Donor Type:   Donation after Brain Death    Donor CMV Status: Positive  Donor HBcAB:Positive  Donor HCV Status:Negative  Donor HBV MINAL:   Donor HCV MINAL: Negative      Subjective:   History of Present Illness:  Mr. Owens is a 54 y.o. year old Black or  male with ESRD secondary to HTN presenting as primary for kidney transplant. He has been on the wait list for a kidney transplant at [facility] since 4/1/2020. Patient is currently on hemodialysis started on 4/1/2020. Patient is dialyzing on MWF schedule. Patient reports that he is tolerating dialysis well. EDW 83.5 kg. He has a LUE AV fistula. Patient denies any recent hospitalizations or ED visits. Dr James will be the primary surgeon. Pre op labs and imaging will be reviewed prior to surgery.     Mr. Owens is a 55 y.o. year old male who is status post Kidney Transplant - 3/5/2025  (#1).  His maintenance immunosuppression consists of:   Immunosuppressants (From admission, onward)      Start     Stop Route Frequency Ordered    03/06/25 0900  antithymocyte globulin (rabbit) 175 mg, hydrocortisone sodium succinate (SOLU-CORTEF) 20 mg in 0.9% NaCl 500 mL (FOR PERIPHERAL LINE ADMINISTRATION ONLY)  (IP TXP KIDNEY POST-OP THYMO WITH PERIPHERAL PRECHECKED IBW)         03/07/25 0859 IV Daily 03/05/25 1721    03/05/25 2200  mycophenolate capsule 1,000 mg  (IP TXP KIDNEY POST-OP THYMO WITH PERIPHERAL PRECHECKED IBW)         -- Oral 2 times daily 03/05/25 1721    03/05/25 1830  tacrolimus capsule 3 mg  (IP TXP KIDNEY POST-OP THYMO WITH PERIPHERAL PRECHECKED IBW)         -- Oral 2 times daily 03/05/25 1721            Hospital Course:  Mr. Owens is now s/p DBD Ktx on 3/5/25 2/2 HTN (CIT 17h 16 min, CMV +/+). Straightforward care. 2 day ramires. No drains.  Out of the OR ~1700 3/5.     Interval History: POD#1 s/p kidney transplant. Doing well overall. Cr trending down, making ~100 cc urine/hour. H/H decreased this AM, but repeat improved. HDS. Tolerating PO intake and motivated to move. Pain appropriately controlled. Pathway advanced; MIVF through tonight. Encourage oral hydration and OOBTC/ambulation. Keane to be removed tomorrow at 0600. WCTM.      Past Medical, Surgical, Family, and Social History:   Unchanged from H&P.    Scheduled Meds:   acetaminophen  650 mg Oral Q8H    acetaminophen  650 mg Oral Q24H    antithymocyte globulin (rabbit) 175 mg, hydrocortisone sodium succinate (SOLU-CORTEF) 20 mg in 0.9% NaCl 500 mL (FOR PERIPHERAL LINE ADMINISTRATION ONLY)  2.25 mg/kg (Ideal) Intravenous Daily    bisacodyL  10 mg Oral QHS    diphenhydrAMINE  25 mg Oral Q24H    docusate sodium  100 mg Oral TID    famotidine  20 mg Oral QHS    heparin (porcine)  5,000 Units Subcutaneous Q8H    methylPREDNISolone injection (PEDS and ADULTS)  250 mg Intravenous Once    multivitamin  1 tablet Oral Daily    mupirocin  1 g Nasal BID    mycophenolate  1,000 mg Oral BID    [START ON 3/7/2025] predniSONE  20 mg Oral Daily    sodium bicarbonate  650 mg Oral BID    [START ON 3/15/2025] sulfamethoxazole-trimethoprim 400-80mg  1 tablet Oral Daily AM    tacrolimus  3 mg Oral BID    [START ON 3/15/2025] valGANciclovir  450 mg Oral Daily AM     Continuous Infusions:   0.9% NaCl   Intravenous Continuous 75 mL/hr at 03/06/25 0954 New Bag at 03/06/25 0954    glucagon (human recombinant)  1 mg Intramuscular Continuous PRN         PRN Meds:  Current Facility-Administered Medications:     dextrose 50%, 12.5 g, Intravenous, PRN    dextrose 50%, 12.5 g, Intravenous, PRN    dextrose 50%, 25 g, Intravenous, PRN    diphenhydrAMINE, 50 mg, Oral, Once PRN    EPINEPHrine (PF), 1 mg, Subcutaneous, Once PRN    glucagon (human recombinant), 1 mg, Intramuscular, PRN    glucagon (human recombinant), 1 mg,  Intramuscular, Continuous PRN    glucose, 16 g, Oral, PRN    glucose, 16 g, Oral, PRN    glucose, 16 g, Oral, PRN    glucose, 24 g, Oral, PRN    glucose, 24 g, Oral, PRN    hydrocortisone sodium succinate, 100 mg, Intravenous, Once PRN    insulin aspart U-100, 0-10 Units, Subcutaneous, QID (AC + HS) PRN    ondansetron, 4 mg, Intravenous, Q6H PRN    oxyCODONE, 5 mg, Oral, Q6H PRN    sodium chloride 0.9%, 10 mL, Intravenous, PRN    Intake/Output - Last 3 Shifts         03/04 0700 03/05 0659 03/05 0700 03/06 0659 03/06 0700 03/07 0659    P.O.  480 360    I.V. (mL/kg)  100 (1.2)     IV Piggyback  1965     Total Intake(mL/kg)  2545 (30.5) 360 (4.3)    Urine (mL/kg/hr)  2035 225 (0.5)    Total Output  2035 225    Net  +510 +135                    Review of Systems   Constitutional:  Negative for chills and fever.   HENT:  Negative for facial swelling and trouble swallowing.    Eyes:  Negative for photophobia and redness.   Respiratory:  Negative for cough, shortness of breath, wheezing and stridor.    Cardiovascular:  Negative for chest pain, palpitations and leg swelling.   Gastrointestinal:  Positive for abdominal pain (incisional). Negative for abdominal distention, diarrhea, nausea and vomiting.   Genitourinary:  Negative for decreased urine volume.   Skin:  Positive for wound.   Allergic/Immunologic: Positive for immunocompromised state.   Neurological:  Negative for dizziness and light-headedness.   Psychiatric/Behavioral:  Negative for agitation, behavioral problems, confusion and decreased concentration.       Objective:     Vital Signs (Most Recent):  Temp: 98.4 °F (36.9 °C) (03/06/25 1143)  Pulse: 84 (03/06/25 0759)  Resp: 18 (03/06/25 0914)  BP: 101/61 (03/06/25 0759)  SpO2: 98 % (03/06/25 0759) Vital Signs (24h Range):  Temp:  [97 °F (36.1 °C)-98.5 °F (36.9 °C)] 98.4 °F (36.9 °C)  Pulse:  [78-99] 84  Resp:  [12-23] 18  SpO2:  [94 %-100 %] 98 %  BP: (101-162)/(61-89) 101/61     Weight: 83.5 kg (184 lb 1.4  "oz)  Height: 5' 11" (180.3 cm)  Body mass index is 25.67 kg/m².     Physical Exam  Vitals and nursing note reviewed.   Constitutional:       General: He is not in acute distress.  HENT:      Head: Normocephalic and atraumatic.   Eyes:      General: No scleral icterus.        Right eye: No discharge.         Left eye: No discharge.   Cardiovascular:      Rate and Rhythm: Normal rate and regular rhythm.   Pulmonary:      Effort: Pulmonary effort is normal. No respiratory distress.      Breath sounds: No wheezing or rales.   Abdominal:      General: There is no distension.      Palpations: Abdomen is soft.      Tenderness: There is abdominal tenderness (appropriate post op). There is no guarding.      Comments: Hypoactive BS  RLQ incision MARY with staples no s/s/i   Musculoskeletal:      Right lower leg: No edema.      Left lower leg: No edema.   Skin:     General: Skin is warm and dry.   Neurological:      General: No focal deficit present.      Mental Status: He is alert and oriented to person, place, and time.   Psychiatric:         Mood and Affect: Mood normal.         Behavior: Behavior normal.         Thought Content: Thought content normal.          Laboratory:  CBC:   Recent Labs   Lab 03/05/25 2256 03/06/25  0542 03/06/25  0837   WBC 8.66 10.87 11.99   RBC 3.25* 2.73* 2.91*   HGB 9.5* 7.9* 8.7*   HCT 30.4* 25.0* 26.7*    150 156   MCV 94 92 92   MCH 29.2 28.9 29.9   MCHC 31.3* 31.6* 32.6     CMP:   Recent Labs   Lab 03/05/25  1010 03/05/25  1725 03/05/25  2256 03/06/25  0542   GLU 85 112* 128* 338*   CALCIUM 9.3 8.6* 8.1* 7.5*   ALBUMIN 4.0 3.7 3.3* 2.9*   PROT 8.1  --   --   --     139 140 135*   K 3.8 4.1 4.4 3.7   CO2 22* 19* 21* 19*    104 107 106   BUN 43* 45* 44* 43*   CREATININE 10.7* 10.9* 10.6* 8.1*   ALKPHOS 65  --   --   --    ALT 9*  --   --   --    AST 15  --   --   --      Assessment/Plan:     * S/P kidney transplant  - S/p DBD Ktx on 3/5/25 2/2 HTN (CIT 17h 16 min, CMV +/+). " "Straightforward care. 2 day ramires. No drains. Out of the OR ~1700 3/5.   - Cr trending down, making ~100 cc urine/hour.   - H/H decreased POD#1, but repeat improved. HDS.   - Tolerating PO intake and motivated to move. Pain appropriately controlled.   - Pathway advanced; MIVF through tonight.  - Encourage oral hydration and OOBTC/ambulation.   - Ramires to be removed 3/7 at 0600.   - Plan for dc 3/7 or 3/8.    Prophylactic immunotherapy  - Continue Prograf. Monitor trough daily and adjust dose as needed to achieve therapeutic level.  - Continue Cellcept.  - Continue steroids.      At risk for opportunistic infections  - Bactrim for PCP ppx  - Valcyte for CMV ppx      Long-term use of immunosuppressant medication  - See "prophylactic immunotherapy."      Anemia of chronic disease  - Due to renal disease and surgery.  - H/H with slight downtrend post op, possibly dilutional.  - HDS.  - Cont to monitor with daily cbc.    Steroid-induced hyperglycemia  - Endocrine following, appreciate assistance.      Metabolic acidosis  - Continue oral bicarb.      Discharge Planning:  Not today.    Medical decision making for this encounter includes review of pertinent labs and diagnostic studies, assessment and planning, discussions with consulting providers, discussion with patient/family, and participation in multidisciplinary rounds. Time spent caring for patient: 60 minutes    Imelda Bass PA-C  Kidney Transplant  [facility] - Transplant Step[facility]  "

## 2025-03-06 NOTE — ASSESSMENT & PLAN NOTE
Titrate insulin slowly to avoid hypoglycemia as the risk of hypoglycemia increases with decreased creatinine clearance.  Estimated Creatinine Clearance: 11 mL/min (A) (based on SCr of 8.1 mg/dL (H)).  Pending kidney transplant  Glucocorticoids markedly increase glucose levels. Expect the steroid taper will help glucose control.

## 2025-03-06 NOTE — SUBJECTIVE & OBJECTIVE
"Interval HPI:   Overnight events: No acute events overnight. Patient in room 78229/58475 A. Blood glucose stable. BG at and above goal on current insulin regimen (SSI ). Steroid use- Methylprednisolone  250 mg. 1 Day Post-Op  Renal function- Abnormal - Creatinine 10.7   Vasopressors-  None       Endocrine will continue to follow and manage insulin orders inpatient.         Diet Adult Regular Low Phosphorus     Eatin%  Nausea: No  Hypoglycemia and intervention: No  Fever: No  TPN and/or TF: No    /61   Pulse 84   Temp 98.5 °F (36.9 °C) (Oral)   Resp 18   Ht 5' 11" (1.803 m)   Wt 83.5 kg (184 lb 1.4 oz)   SpO2 98%   BMI 25.67 kg/m²     Labs Reviewed and Include    Recent Labs   Lab 25  1010 25  1725 25  0542   GLU 85   < > 338*   CALCIUM 9.3   < > 7.5*   ALBUMIN 4.0   < > 2.9*   PROT 8.1  --   --       < > 135*   K 3.8   < > 3.7   CO2 22*   < > 19*      < > 106   BUN 43*   < > 43*   CREATININE 10.7*   < > 8.1*   ALKPHOS 65  --   --    ALT 9*  --   --    AST 15  --   --    BILITOT 0.7  --   --     < > = values in this interval not displayed.     Lab Results   Component Value Date    WBC 11.99 2025    HGB 8.7 (L) 2025    HCT 26.7 (L) 2025    MCV 92 2025     2025     No results for input(s): "TSH", "FREET4" in the last 168 hours.  Lab Results   Component Value Date    HGBA1C 5.1 2024       Nutritional status:   Body mass index is 25.67 kg/m².  Lab Results   Component Value Date    ALBUMIN 2.9 (L) 2025    ALBUMIN 3.3 (L) 2025    ALBUMIN 3.7 2025     No results found for: "PREALBUMIN"    Estimated Creatinine Clearance: 11 mL/min (A) (based on SCr of 8.1 mg/dL (H)).    Accu-Checks  Recent Labs     25  1732   POCTGLUCOSE 109       Current Medications and/or Treatments Impacting Glycemic Control  Immunotherapy:    Immunosuppressants           Stop Route Frequency     antithymocyte globulin (rabbit) 175 mg, " hydrocortisone sodium succinate (SOLU-CORTEF) 20 mg in 0.9% NaCl 500 mL (FOR PERIPHERAL LINE ADMINISTRATION ONLY)         03/07/25 0859 IV Daily     mycophenolate capsule 1,000 mg         -- Oral 2 times daily     tacrolimus capsule 3 mg         -- Oral 2 times daily          Steroids:   Hormones (From admission, onward)      Start     Stop Route Frequency Ordered    03/07/25 0900  predniSONE tablet 20 mg  (IP TXP KIDNEY POST-OP THYMO WITH PERIPHERAL PRECHECKED IBW)         -- Oral Daily 03/05/25 1721    03/06/25 0900  antithymocyte globulin (rabbit) 175 mg, hydrocortisone sodium succinate (SOLU-CORTEF) 20 mg in 0.9% NaCl 500 mL (FOR PERIPHERAL LINE ADMINISTRATION ONLY)  (IP TXP KIDNEY POST-OP THYMO WITH PERIPHERAL PRECHECKED IBW)         03/07/25 0859 IV Daily 03/05/25 1721    03/06/25 0800  methylPREDNISolone sodium succinate injection 250 mg  (IP TXP KIDNEY POST-OP THYMO WITH PERIPHERAL PRECHECKED IBW)         -- IV Once 03/05/25 1721    03/05/25 1821  hydrocortisone sodium succinate injection 100 mg  (IP TXP KIDNEY POST-OP THYMO WITH PERIPHERAL PRECHECKED IBW)         08/01/36 1021 IV Once as needed 03/05/25 1721          Pressors:    Autonomic Drugs (From admission, onward)      Start     Stop Route Frequency Ordered    03/05/25 1821  EPINEPHrine (PF) injection 1 mg  (IP TXP KIDNEY POST-OP THYMO WITH PERIPHERAL PRECHECKED IBW)         08/01/36 1021 SubQ Once as needed 03/05/25 1721          Hyperglycemia/Diabetes Medications:   Antihyperglycemics (From admission, onward)      Start     Stop Route Frequency Ordered    03/05/25 1537  insulin aspart U-100 pen 0-10 Units         -- SubQ Before meals & nightly PRN 03/05/25 1437

## 2025-03-06 NOTE — ASSESSMENT & PLAN NOTE
- Due to renal disease and surgery.  - H/H with slight downtrend post op, possibly dilutional.  - HDS.  - Cont to monitor with daily cbc.

## 2025-03-06 NOTE — PROGRESS NOTES
Admit Note     Met with patient to assess needs. Patient is a 55 y.o. single male, admitted for for kidney transplant.      Patient admitted from home on 3/5/2025 .  At this time, patient presents as alert and oriented x 4, pleasant, good eye contact, well groomed, recall good, concentration/judgement good, average intelligence, calm, communicative, cooperative, and asking and answering questions appropriately.  At this time, patients caregiver presents as  not in room .    Household/Family Systems     Patient resides with patient's sister, at home in [place], [place].  Support system includes siblings,adult children and extended family.  Patient does not have dependents that are need of being cared for.     Patients primary caregiver is Gertrude Owens, patients sister, phone number [phone].  Confirmed patients contact information is [phone] (home);   Telephone Information:   Mobile [phone]   .    During admission, patient's caregiver plans to stay in patient's room.  Confirmed patient and patients caregivers do have access to reliable transportation.    Cognitive Status/Learning     Patient reports reading ability as 12th grade and states patient does have difficulty with reading small print.  Patient reports patient learns best by multisensory.   Needed: No.   Highest education level: High School (9-12) or GED    Vocation/Disability   .  Working for Income: No  If no, reason not working: Disability    Adherence     Patient reports a high level of adherence to patients health care regimen.  Adherence counseling and education provided. Patient verbalizes understanding.    Substance Use    Patient reports the following substance usage.    Tobacco: none, patient denies any use.  Alcohol: none, patient denies any use.  Illicit Drugs/Non-prescribed Medications: none, patient denies any use.  Patient states clear understanding of the potential impact of substance use.  Substance  abstinence/cessation counseling, education and resources provided and reviewed.     Services Utilizing/ADLS    Infusion Service: Prior to admission, patient utilizing? no  Home Health: Prior to admission, patient utilizing? no  DME: Prior to admission, no  Pulmonary/Cardiac Rehab: Prior to admission, no  Dialysis:  Prior to admission, yes  Transplant Specialty Pharmacy:  Prior to admission, yes; [facility] .    Prior to admission, patient reports patient was independent with ADLS and was not driving.  Patient reports patient is not able to care for self at this time due to compromised medical condition (as documented in medical record) and physical weakness..  Patient indicates a willingness to care for self once medically cleared to do so.    Insurance/Medications    Insured by   Payer/Plan Subscr  Sex Relation Sub. Ins. ID Effective Group Num   1. [employer] MANAGE* HARRISON KENT 1970 Male Self [id number] [birth year] [id number]                                   [address] [address]      Primary Insurance (for UNOS reporting): Public Insurance - Medicare FFS (Fee For Service)  Secondary Insurance (for UNOS reporting): None    Patient reports patient is able to obtain and afford medications at this time and at time of discharge.    Living Will/Healthcare Power of     Patient states patient does not have a LW and/or HCPA.   provided education regarding LW and HCPA and the completion of forms. Pt trust sister Gertrude with medical decisions as needed     Coping/Mental Health    Patient is coping well with the aid of  family members and friends.  Patient denies mental health difficulties.     Discharge Planning    At time of discharge, patient plans to return to patient's home under the care of Gertrude.  Patients sister will transport patient.  Per rounds today, expected discharge date is possibly the weekend . Patient and patients caregiver  verbalize understanding and are involved in  treatment planning and discharge process.    Additional Concerns    Patient is being followed for needs, education, resources, information, emotional support, supportive counseling, and for supportive and skilled discharge plan of care.  providing ongoing psychosocial support, education, resources and d/c planning as needed.  SW remains available. Patient verbalizes understanding and agreement with information reviewed, social work availability, and how to access available resources as needed.

## 2025-03-06 NOTE — ASSESSMENT & PLAN NOTE
- S/p DBD Ktx on 3/5/25 2/2 HTN (CIT 17h 16 min, CMV +/+). Straightforward care. 2 day ramires. No drains. Out of the OR ~1700 3/5.   - Cr trending down, making ~100 cc urine/hour.   - H/H decreased POD#1, but repeat improved. HDS.   - Tolerating PO intake and motivated to move. Pain appropriately controlled.   - Pathway advanced; MIVF through tonight.  - Encourage oral hydration and OOBTC/ambulation.   - Ramires to be removed 3/7 at 0600.   - Plan for dc 3/7 or 3/8.

## 2025-03-06 NOTE — PROGRESS NOTES
"[facility]  Endocrinology  Progress Note    Admit Date: 3/5/2025     Reason for Consult: Management of Hyperglycemia     Surgical Procedure and Date: pending kidney transplant on 2025    Patient is not diabetic and does not currently take any oral/injectable antidiabetic/hypoglycemic medications.   Hemoglobin A1C   Date Value Ref Range Status   2024 5.1 4.8 - 5.9 % Final   11/15/2019 5.2 4.0 - 5.6 % Final     Comment:     ADA Screening Guidelines:  5.7-6.4%  Consistent with prediabetes  >or=6.5%  Consistent with diabetes  High levels of fetal hemoglobin interfere with the HbA1C  assay. Heterozygous hemoglobin variants (HbS, HgC, etc)do  not significantly interfere with this assay.   However, presence of multiple variants may affect accuracy.            HPI: Mr. Owens is a 54 y.o. year old Black or  male with ESRD secondary to HTN presenting as primary for kidney transplant. Endocrine consulted to manage hyperglycemia in the context of high-dose steroids.           Interval HPI:   Overnight events: No acute events overnight. Patient in room 09628/54749 A. Blood glucose stable. BG at and above goal on current insulin regimen (SSI ). Steroid use- Methylprednisolone  250 mg. 1 Day Post-Op  Renal function- Abnormal - Creatinine 10.7   Vasopressors-  None       Endocrine will continue to follow and manage insulin orders inpatient.         Diet Adult Regular Low Phosphorus     Eatin%  Nausea: No  Hypoglycemia and intervention: No  Fever: No  TPN and/or TF: No    /61   Pulse 84   Temp 98.5 °F (36.9 °C) (Oral)   Resp 18   Ht 5' 11" (1.803 m)   Wt 83.5 kg (184 lb 1.4 oz)   SpO2 98%   BMI 25.67 kg/m²     Labs Reviewed and Include    Recent Labs   Lab 25  1010 25  1725 25  0542   GLU 85   < > 338*   CALCIUM 9.3   < > 7.5*   ALBUMIN 4.0   < > 2.9*   PROT 8.1  --   --       < > 135*   K 3.8   < > 3.7   CO2 22*   < > 19*      < > 106 " "  BUN 43*   < > 43*   CREATININE 10.7*   < > 8.1*   ALKPHOS 65  --   --    ALT 9*  --   --    AST 15  --   --    BILITOT 0.7  --   --     < > = values in this interval not displayed.     Lab Results   Component Value Date    WBC 11.99 03/06/2025    HGB 8.7 (L) 03/06/2025    HCT 26.7 (L) 03/06/2025    MCV 92 03/06/2025     03/06/2025     No results for input(s): "TSH", "FREET4" in the last 168 hours.  Lab Results   Component Value Date    HGBA1C 5.1 12/18/2024       Nutritional status:   Body mass index is 25.67 kg/m².  Lab Results   Component Value Date    ALBUMIN 2.9 (L) 03/06/2025    ALBUMIN 3.3 (L) 03/05/2025    ALBUMIN 3.7 03/05/2025     No results found for: "PREALBUMIN"    Estimated Creatinine Clearance: 11 mL/min (A) (based on SCr of 8.1 mg/dL (H)).    Accu-Checks  Recent Labs     03/05/25  1732   POCTGLUCOSE 109       Current Medications and/or Treatments Impacting Glycemic Control  Immunotherapy:    Immunosuppressants           Stop Route Frequency     antithymocyte globulin (rabbit) 175 mg, hydrocortisone sodium succinate (SOLU-CORTEF) 20 mg in 0.9% NaCl 500 mL (FOR PERIPHERAL LINE ADMINISTRATION ONLY)         03/07/25 0859 IV Daily     mycophenolate capsule 1,000 mg         -- Oral 2 times daily     tacrolimus capsule 3 mg         -- Oral 2 times daily          Steroids:   Hormones (From admission, onward)      Start     Stop Route Frequency Ordered    03/07/25 0900  predniSONE tablet 20 mg  (IP TXP KIDNEY POST-OP THYMO WITH PERIPHERAL PRECHECKED IBW)         -- Oral Daily 03/05/25 1721    03/06/25 0900  antithymocyte globulin (rabbit) 175 mg, hydrocortisone sodium succinate (SOLU-CORTEF) 20 mg in 0.9% NaCl 500 mL (FOR PERIPHERAL LINE ADMINISTRATION ONLY)  (IP TXP KIDNEY POST-OP THYMO WITH PERIPHERAL PRECHECKED IBW)         03/07/25 0859 IV Daily 03/05/25 1721 03/06/25 0800  methylPREDNISolone sodium succinate injection 250 mg  (IP TXP KIDNEY POST-OP THYMO WITH PERIPHERAL PRECHECKED IBW)         " -- IV Once 03/05/25 1721    03/05/25 1821  hydrocortisone sodium succinate injection 100 mg  (IP TXP KIDNEY POST-OP THYMO WITH PERIPHERAL PRECHECKED IBW)         08/01/36 1021 IV Once as needed 03/05/25 1721          Pressors:    Autonomic Drugs (From admission, onward)      Start     Stop Route Frequency Ordered    03/05/25 1821  EPINEPHrine (PF) injection 1 mg  (IP TXP KIDNEY POST-OP THYMO WITH PERIPHERAL PRECHECKED IBW)         08/01/36 1021 SubQ Once as needed 03/05/25 1721          Hyperglycemia/Diabetes Medications:   Antihyperglycemics (From admission, onward)      Start     Stop Route Frequency Ordered    03/05/25 1537  insulin aspart U-100 pen 0-10 Units         -- SubQ Before meals & nightly PRN 03/05/25 1437            ASSESSMENT and PLAN    Cardiac/Vascular  Benign hypertension with ESRD (end-stage renal disease)  Uncontrolled HTN can worsen insulin resistance.         Renal/  * ESRD on dialysis  Titrate insulin slowly to avoid hypoglycemia as the risk of hypoglycemia increases with decreased creatinine clearance.  Estimated Creatinine Clearance: 11 mL/min (A) (based on SCr of 8.1 mg/dL (H)).  Pending kidney transplant  Glucocorticoids markedly increase glucose levels. Expect the steroid taper will help glucose control.         Endocrine  Steroid-induced hyperglycemia  Endocrinology consulted for BG management.   BG goal 140-180    - Novolog (Insulin Aspart) prn for BG excursions [facility] (150/25)  - BG checks AC/HS  - Hypoglycemia protocol in place    ** Please notify Endocrine for any change and/or advance in diet**  ** Please call Endocrine for any BG related issues **    Discharge Planning:   TBD. Please notify endocrinology prior to discharge.              Kingsley Solomon, DNP, FNP  Endocrinology  [facility]

## 2025-03-06 NOTE — NURSING TRANSFER
Nursing Transfer Note      3/5/2025   6:08 PM    Nurse giving handoff:Oskar LEDBETTER Rn  Nurse receiving handoff: Malena Kahn    Reason patient is being transferred: postop    Transfer To: [id number]    Transfer via stretcher    Transfer with n/a    Transported by pacu pct    Transfer Vital Signs:  Blood Pressure:see flowsheet  Heart Rate:  O2:  Temperature:  Respirations:    Telemetry: n/a  Order for Tele Monitor? No    Additional Lines: Keane Catheter    Medicines sent: IVF,D51/2, rabbit    Any special needs or follow-up needed:     Patient belongings transferred with patient: No    Chart send with patient: Yes    Notified: family via text    Patient reassessed at: 3/5/25  1  Upon arrival to floor: bed in lowest position

## 2025-03-06 NOTE — CONSULTS
"  [facility]  Adult Nutrition  Consult Note    SUMMARY     Recommendations  1. Continue Regular, low phosphorus diet   2. RD following    Goals: Meet % of EEN/EPN by RD f/u date  Nutrition Goal Status: progressing   Communication of RD Recs: POC    Nutrition Discharge Planning     Nutrition Discharge Planning: Post transplant nutrition education provided. Food safety/drug interactions emphasized. General healthy diet recommended. RD name/contact information, education material left.  No other needs identified. Caregiver present.      Assessment and Plan    Nutrition Problem  Increased nutrient needs (protein, calorie)    Related to (etiology):  Increased metabolic stress associated with transplant    Signs and Symptoms (as evidenced by):  S/p kidney transplantation and presence of surgical wound    Interventions(treatment strategy):  Collaboration of nutrition care w/ other providers    Nutrition Diagnosis Status:  New       Reason for Assessment    Reason For Assessment: consult  Diagnosis: other (see comments) (s/p kidney transplant)  General Information Comments: RD consulted for s/p kidney transplant. Spoke w/ pt at bedside, reports a fair apetite currently. and reports good appetite PTA. 100% meal consumption noted. Pt denies any difficulties w/ chewing/swallowing. Pt report "bubbling" of stomach. NFPE not warranted as pt is well nourished w/ no s/s of malnutrition at this time. RD following. LBM noted-3/5  Nutrition Related Social Determinants of Health: SDOH: None Identified      Nutrition/Diet History    Spiritual, Cultural Beliefs, Mosque Practices, Values that Affect Care: no  Food Allergies: NKFA    Anthropometrics    Height: 5' 11" (180.3 cm)  Height (inches): 71 in  Height Method: Stated  Weight: 83.5 kg (184 lb 1.4 oz)  Weight (lb): 184.09 lb  Weight Method: Standard Scale  Ideal Body Weight (IBW), Male: 172 lb  % Ideal Body Weight, Male (lb): 107.03 %  BMI (Calculated): " 25.7  BMI Grade: 25 - 29.9 - overweight       Lab/Procedures/Meds    Pertinent Labs Reviewed: reviewed  Pertinent Labs Comments: GFR 6.5  Pertinent Medications Reviewed: reviewed  Pertinent Medications Comments: tacrolimus    Estimated/Assessed Needs    Weight Used For Calorie Calculations: 83.5 kg (184 lb 1.4 oz)  Energy Calorie Requirements (kcal): 2087-2922  Energy Need Method: Kcal/kg (25-35 kcal/kg)  Protein Requirements:  g (0.8-1.5 g/kg)  Weight Used For Protein Calculations: 83.5 kg (184 lb 1.4 oz)        RDA Method (mL): 2087         Nutrition Prescription Ordered    Current Diet Order: Regular, low phosphorus    Evaluation of Received Nutrient/Fluid Intake    I/O: -  Energy Calories Required: meeting needs  Protein Required: meeting needs  Fluid Required: meeting needs  Total Fluid Intake (mL/kg): 1 ml or fluid per MD  Tolerance: tolerating  % Intake of Estimated Energy Needs: 100%  % Meal Intake: 100%    Nutrition Risk    Level of Risk/Frequency of Follow-up: low ((1-2x/week))       Monitor and Evaluation    Monitor and Evaluation: Energy intake, Food and beverage intake       Nutrition Follow-Up    RD Follow-up?: Yes

## 2025-03-06 NOTE — ANESTHESIA POSTPROCEDURE EVALUATION
Anesthesia Post Evaluation    Patient: Misael Owens    Procedure(s) Performed: Procedure(s) (LRB):  TRANSPLANT, KIDNEY (N/A)    Final Anesthesia Type: general      Patient location during evaluation: PACU  Patient participation: Yes- Able to Participate  Level of consciousness: awake and alert  Post-procedure vital signs: reviewed and stable  Pain management: adequate  Airway patency: patent    PONV status at discharge: No PONV  Anesthetic complications: no      Cardiovascular status: hemodynamically stable  Hydration status: euvolemic  Follow-up not needed.              Vitals Value Taken Time   /68 03/06/25 11:44   Temp 36.9 °C (98.4 °F) 03/06/25 11:43   Pulse 84 03/06/25 14:44   Resp 19 03/06/25 14:44   SpO2 100 % 03/06/25 14:44   Vitals shown include unfiled device data.      Event Time   Out of Recovery 18:30:00         Pain/Kian Score: Pain Rating Prior to Med Admin: 8 (3/6/2025  2:00 PM)  Pain Rating Post Med Admin: 8 (3/5/2025  6:15 PM)  Kian Score: 9 (3/5/2025  6:30 PM)

## 2025-03-06 NOTE — SUBJECTIVE & OBJECTIVE
Subjective:   History of Present Illness:  Mr. Owens is a 54 y.o. year old Black or  male with ESRD secondary to HTN presenting as primary for kidney transplant. He has been on the wait list for a kidney transplant at [facility] since 4/1/2020. Patient is currently on hemodialysis started on 4/1/2020. Patient is dialyzing on MWF schedule. Patient reports that he is tolerating dialysis well. EDW 83.5 kg. He has a LUE AV fistula. Patient denies any recent hospitalizations or ED visits. Dr James will be the primary surgeon. Pre op labs and imaging will be reviewed prior to surgery.     Mr. Owens is a 55 y.o. year old male who is status post Kidney Transplant - 3/5/2025  (#1).  His maintenance immunosuppression consists of:   Immunosuppressants (From admission, onward)      Start     Stop Route Frequency Ordered    03/06/25 0900  antithymocyte globulin (rabbit) 175 mg, hydrocortisone sodium succinate (SOLU-CORTEF) 20 mg in 0.9% NaCl 500 mL (FOR PERIPHERAL LINE ADMINISTRATION ONLY)  (IP TXP KIDNEY POST-OP THYMO WITH PERIPHERAL PRECHECKED IBW)         03/07/25 0859 IV Daily 03/05/25 1721    03/05/25 2200  mycophenolate capsule 1,000 mg  (IP TXP KIDNEY POST-OP THYMO WITH PERIPHERAL PRECHECKED IBW)         -- Oral 2 times daily 03/05/25 1721    03/05/25 1830  tacrolimus capsule 3 mg  (IP TXP KIDNEY POST-OP THYMO WITH PERIPHERAL PRECHECKED IBW)         -- Oral 2 times daily 03/05/25 1721            Hospital Course:  Mr. Owens is now s/p DBD Ktx on 3/5/25 2/2 HTN (CIT 17h 16 min, CMV +/+). Straightforward care. 2 day ramires. No drains. Out of the OR ~1700 3/5.     Interval History: POD#1 s/p kidney transplant. Doing well overall. Cr trending down, making ~100 cc urine/hour. H/H decreased this AM, but repeat improved. HDS. Tolerating PO intake and motivated to move. Pain appropriately controlled. Pathway advanced; MIVF through tonight. Encourage oral hydration and OOBTC/ambulation. Ramires to be removed tomorrow at  0600. [facility].      Past Medical, Surgical, Family, and Social History:   Unchanged from H&P.    Scheduled Meds:   acetaminophen  650 mg Oral Q8H    acetaminophen  650 mg Oral Q24H    antithymocyte globulin (rabbit) 175 mg, hydrocortisone sodium succinate (SOLU-CORTEF) 20 mg in 0.9% NaCl 500 mL (FOR PERIPHERAL LINE ADMINISTRATION ONLY)  2.25 mg/kg (Ideal) Intravenous Daily    bisacodyL  10 mg Oral QHS    diphenhydrAMINE  25 mg Oral Q24H    docusate sodium  100 mg Oral TID    famotidine  20 mg Oral QHS    heparin (porcine)  5,000 Units Subcutaneous Q8H    methylPREDNISolone injection (PEDS and ADULTS)  250 mg Intravenous Once    multivitamin  1 tablet Oral Daily    mupirocin  1 g Nasal BID    mycophenolate  1,000 mg Oral BID    [START ON 3/7/2025] predniSONE  20 mg Oral Daily    sodium bicarbonate  650 mg Oral BID    [START ON 3/15/2025] sulfamethoxazole-trimethoprim 400-80mg  1 tablet Oral Daily AM    tacrolimus  3 mg Oral BID    [START ON 3/15/2025] valGANciclovir  450 mg Oral Daily AM     Continuous Infusions:   0.9% NaCl   Intravenous Continuous 75 mL/hr at 03/06/25 0954 New Bag at 03/06/25 0954    glucagon (human recombinant)  1 mg Intramuscular Continuous PRN         PRN Meds:  Current Facility-Administered Medications:     dextrose 50%, 12.5 g, Intravenous, PRN    dextrose 50%, 12.5 g, Intravenous, PRN    dextrose 50%, 25 g, Intravenous, PRN    diphenhydrAMINE, 50 mg, Oral, Once PRN    EPINEPHrine (PF), 1 mg, Subcutaneous, Once PRN    glucagon (human recombinant), 1 mg, Intramuscular, PRN    glucagon (human recombinant), 1 mg, Intramuscular, Continuous PRN    glucose, 16 g, Oral, PRN    glucose, 16 g, Oral, PRN    glucose, 16 g, Oral, PRN    glucose, 24 g, Oral, PRN    glucose, 24 g, Oral, PRN    hydrocortisone sodium succinate, 100 mg, Intravenous, Once PRN    insulin aspart U-100, 0-10 Units, Subcutaneous, QID (AC + HS) PRN    ondansetron, 4 mg, Intravenous, Q6H PRN    oxyCODONE, 5 mg, Oral, Q6H PRN    sodium  "chloride 0.9%, 10 mL, Intravenous, PRN    Intake/Output - Last 3 Shifts         03/04 0700  03/05 0659 03/05 0700 03/06 0659 03/06 0700  03/07 0659    P.O.  480 360    I.V. (mL/kg)  100 (1.2)     IV Piggyback  1965     Total Intake(mL/kg)  2545 (30.5) 360 (4.3)    Urine (mL/kg/hr)  2035 225 (0.5)    Total Output  2035 225    Net  +510 +135                    Review of Systems   Constitutional:  Negative for chills and fever.   HENT:  Negative for facial swelling and trouble swallowing.    Eyes:  Negative for photophobia and redness.   Respiratory:  Negative for cough, shortness of breath, wheezing and stridor.    Cardiovascular:  Negative for chest pain, palpitations and leg swelling.   Gastrointestinal:  Positive for abdominal pain (incisional). Negative for abdominal distention, diarrhea, nausea and vomiting.   Genitourinary:  Negative for decreased urine volume.   Skin:  Positive for wound.   Allergic/Immunologic: Positive for immunocompromised state.   Neurological:  Negative for dizziness and light-headedness.   Psychiatric/Behavioral:  Negative for agitation, behavioral problems, confusion and decreased concentration.       Objective:     Vital Signs (Most Recent):  Temp: 98.4 °F (36.9 °C) (03/06/25 1143)  Pulse: 84 (03/06/25 0759)  Resp: 18 (03/06/25 0914)  BP: 101/61 (03/06/25 0759)  SpO2: 98 % (03/06/25 0759) Vital Signs (24h Range):  Temp:  [97 °F (36.1 °C)-98.5 °F (36.9 °C)] 98.4 °F (36.9 °C)  Pulse:  [78-99] 84  Resp:  [12-23] 18  SpO2:  [94 %-100 %] 98 %  BP: (101-162)/(61-89) 101/61     Weight: 83.5 kg (184 lb 1.4 oz)  Height: 5' 11" (180.3 cm)  Body mass index is 25.67 kg/m².     Physical Exam  Vitals and nursing note reviewed.   Constitutional:       General: He is not in acute distress.  HENT:      Head: Normocephalic and atraumatic.   Eyes:      General: No scleral icterus.        Right eye: No discharge.         Left eye: No discharge.   Cardiovascular:      Rate and Rhythm: Normal rate and " regular rhythm.   Pulmonary:      Effort: Pulmonary effort is normal. No respiratory distress.      Breath sounds: No wheezing or rales.   Abdominal:      General: There is no distension.      Palpations: Abdomen is soft.      Tenderness: There is abdominal tenderness (appropriate post op). There is no guarding.      Comments: Hypoactive BS  RLQ incision MARY with staples no s/s/i   Musculoskeletal:      Right lower leg: No edema.      Left lower leg: No edema.   Skin:     General: Skin is warm and dry.   Neurological:      General: No focal deficit present.      Mental Status: He is alert and oriented to person, place, and time.   Psychiatric:         Mood and Affect: Mood normal.         Behavior: Behavior normal.         Thought Content: Thought content normal.          Laboratory:  CBC:   Recent Labs   Lab 03/05/25  2256 03/06/25  0542 03/06/25  0837   WBC 8.66 10.87 11.99   RBC 3.25* 2.73* 2.91*   HGB 9.5* 7.9* 8.7*   HCT 30.4* 25.0* 26.7*    150 156   MCV 94 92 92   MCH 29.2 28.9 29.9   MCHC 31.3* 31.6* 32.6     CMP:   Recent Labs   Lab 03/05/25  1010 03/05/25  1725 03/05/25  2256 03/06/25  0542   GLU 85 112* 128* 338*   CALCIUM 9.3 8.6* 8.1* 7.5*   ALBUMIN 4.0 3.7 3.3* 2.9*   PROT 8.1  --   --   --     139 140 135*   K 3.8 4.1 4.4 3.7   CO2 22* 19* 21* 19*    104 107 106   BUN 43* 45* 44* 43*   CREATININE 10.7* 10.9* 10.6* 8.1*   ALKPHOS 65  --   --   --    ALT 9*  --   --   --    AST 15  --   --   --

## 2025-03-06 NOTE — PLAN OF CARE
-Patient [id number]  -Patient up in chair this morning one person assist no complaints of pain   -Patient received a  Kidney Transplant on 03/05/2025  -Patient's post surgery CBC= (H/H 9.5/30.4) (Cr.- 10.6)  -Patient 2200 BG-128 Patient slides at 151 no coverage needed   -Patient has an [place] CDI small dried spots, no active bleeding, abdomen soft/tender  -Continuous fluids, Patient has D5 1/2NS infusing at 50cc/Hr, patient also has 0.9% NS infusing   -Patient UOP (ramires) see flowsheet, UOP dropped to 50cc patient 0.9% NS was held NP notified patient received 500cc bolus, after the bolus was complete patient UOP increased to 150cc 0.9% NS was restarted at 100cc/hour  -Urine characteristics- clear (pink/yellow)  -Patient received PRN Oxy 5 for pain.

## 2025-03-07 VITALS
RESPIRATION RATE: 18 BRPM | DIASTOLIC BLOOD PRESSURE: 80 MMHG | TEMPERATURE: 99 F | OXYGEN SATURATION: 97 % | HEART RATE: 91 BPM | SYSTOLIC BLOOD PRESSURE: 137 MMHG | HEIGHT: 71 IN | WEIGHT: 184.06 LBS | BODY MASS INDEX: 25.77 KG/M2

## 2025-03-07 PROBLEM — N18.6 BENIGN HYPERTENSION WITH ESRD (END-STAGE RENAL DISEASE): Status: RESOLVED | Noted: 2020-03-30 | Resolved: 2025-03-07

## 2025-03-07 PROBLEM — I12.0 BENIGN HYPERTENSION WITH ESRD (END-STAGE RENAL DISEASE): Status: RESOLVED | Noted: 2020-03-30 | Resolved: 2025-03-07

## 2025-03-07 LAB
ALBUMIN SERPL BCP-MCNC: 3.1 G/DL (ref 3.5–5.2)
ANION GAP SERPL CALC-SCNC: 9 MMOL/L (ref 8–16)
BASOPHILS # BLD AUTO: 0 K/UL (ref 0–0.2)
BASOPHILS NFR BLD: 0 % (ref 0–1.9)
BUN SERPL-MCNC: 53 MG/DL (ref 6–20)
CALCIUM SERPL-MCNC: 8.8 MG/DL (ref 8.7–10.5)
CHLORIDE SERPL-SCNC: 107 MMOL/L (ref 95–110)
CLASS I ANTIBODY COMMENTS - LUMINEX: NORMAL
CLASS II ANTIBODY COMMENTS - LUMINEX: NORMAL
CO2 SERPL-SCNC: 20 MMOL/L (ref 23–29)
CPRA %: 0
CREAT SERPL-MCNC: 4.7 MG/DL (ref 0.5–1.4)
DIFFERENTIAL METHOD BLD: ABNORMAL
EOSINOPHIL # BLD AUTO: 0 K/UL (ref 0–0.5)
EOSINOPHIL NFR BLD: 0 % (ref 0–8)
ERYTHROCYTE [DISTWIDTH] IN BLOOD BY AUTOMATED COUNT: 14.3 % (ref 11.5–14.5)
EST. GFR  (NO RACE VARIABLE): 13.9 ML/MIN/1.73 M^2
GLUCOSE SERPL-MCNC: 119 MG/DL (ref 70–110)
HBV SURFACE AB SER QL IA: POSITIVE
HBV SURFACE AB SERPL IA-ACNC: 147 MIU/ML
HCT VFR BLD AUTO: 26.4 % (ref 40–54)
HGB BLD-MCNC: 8.7 G/DL (ref 14–18)
IMM GRANULOCYTES # BLD AUTO: 0.07 K/UL (ref 0–0.04)
IMM GRANULOCYTES NFR BLD AUTO: 0.6 % (ref 0–0.5)
LYMPHOCYTES # BLD AUTO: 0 K/UL (ref 1–4.8)
LYMPHOCYTES NFR BLD: 0.3 % (ref 18–48)
MAGNESIUM SERPL-MCNC: 2.2 MG/DL (ref 1.6–2.6)
MCH RBC QN AUTO: 29.1 PG (ref 27–31)
MCHC RBC AUTO-ENTMCNC: 33 G/DL (ref 32–36)
MCV RBC AUTO: 88 FL (ref 82–98)
MONOCYTES # BLD AUTO: 0.3 K/UL (ref 0.3–1)
MONOCYTES NFR BLD: 2.2 % (ref 4–15)
NEUTROPHILS # BLD AUTO: 11.5 K/UL (ref 1.8–7.7)
NEUTROPHILS NFR BLD: 96.9 % (ref 38–73)
NRBC BLD-RTO: 0 /100 WBC
PHOSPHATE SERPL-MCNC: 4.2 MG/DL (ref 2.7–4.5)
PLATELET # BLD AUTO: 151 K/UL (ref 150–450)
PMV BLD AUTO: 10.3 FL (ref 9.2–12.9)
POCT GLUCOSE: 113 MG/DL (ref 70–110)
POTASSIUM SERPL-SCNC: 3.9 MMOL/L (ref 3.5–5.1)
RBC # BLD AUTO: 2.99 M/UL (ref 4.6–6.2)
SERUM COLLECTION DT - LUMINEX CLASS I: NORMAL
SERUM COLLECTION DT - LUMINEX CLASS II: NORMAL
SODIUM SERPL-SCNC: 136 MMOL/L (ref 136–145)
SPCL1 TESTING DATE: NORMAL
SPCL2 TESTING DATE: NORMAL
SPLUA TESTING DATE: NORMAL
TACROLIMUS BLD-MCNC: 2.3 NG/ML (ref 5–15)
WBC # BLD AUTO: 11.85 K/UL (ref 3.9–12.7)

## 2025-03-07 PROCEDURE — 25000003 PHARM REV CODE 250: Performed by: PHYSICIAN ASSISTANT

## 2025-03-07 PROCEDURE — 80197 ASSAY OF TACROLIMUS: CPT

## 2025-03-07 PROCEDURE — 63600175 PHARM REV CODE 636 W HCPCS: Performed by: INTERNAL MEDICINE

## 2025-03-07 PROCEDURE — 99232 SBSQ HOSP IP/OBS MODERATE 35: CPT | Mod: ,,, | Performed by: NURSE PRACTITIONER

## 2025-03-07 PROCEDURE — 83735 ASSAY OF MAGNESIUM: CPT

## 2025-03-07 PROCEDURE — 1111F DSCHRG MED/CURRENT MED MERGE: CPT | Mod: CPTII,,, | Performed by: PHYSICIAN ASSISTANT

## 2025-03-07 PROCEDURE — 99233 SBSQ HOSP IP/OBS HIGH 50: CPT | Mod: 24,,, | Performed by: PHYSICIAN ASSISTANT

## 2025-03-07 PROCEDURE — 36415 COLL VENOUS BLD VENIPUNCTURE: CPT

## 2025-03-07 PROCEDURE — 25000003 PHARM REV CODE 250

## 2025-03-07 PROCEDURE — 80069 RENAL FUNCTION PANEL: CPT

## 2025-03-07 PROCEDURE — 99024 POSTOP FOLLOW-UP VISIT: CPT | Mod: ,,, | Performed by: PHYSICIAN ASSISTANT

## 2025-03-07 PROCEDURE — 85025 COMPLETE CBC W/AUTO DIFF WBC: CPT

## 2025-03-07 PROCEDURE — 63600175 PHARM REV CODE 636 W HCPCS

## 2025-03-07 RX ORDER — TACROLIMUS 1 MG/1
5 CAPSULE ORAL 2 TIMES DAILY
Status: DISCONTINUED | OUTPATIENT
Start: 2025-03-07 | End: 2025-03-07 | Stop reason: HOSPADM

## 2025-03-07 RX ORDER — SULFAMETHOXAZOLE AND TRIMETHOPRIM 400; 80 MG/1; MG/1
1 TABLET ORAL
Qty: 12 TABLET | Refills: 5 | Status: SHIPPED | OUTPATIENT
Start: 2025-03-07 | End: 2025-09-03

## 2025-03-07 RX ORDER — OXYCODONE HYDROCHLORIDE 5 MG/1
5 TABLET ORAL EVERY 6 HOURS PRN
Qty: 40 TABLET | Refills: 0 | Status: SHIPPED | OUTPATIENT
Start: 2025-03-07

## 2025-03-07 RX ORDER — VALGANCICLOVIR 450 MG/1
450 TABLET, FILM COATED ORAL
Qty: 12 TABLET | Refills: 2 | Status: SHIPPED | OUTPATIENT
Start: 2025-03-07 | End: 2025-06-05

## 2025-03-07 RX ORDER — TACROLIMUS 1 MG/1
5 CAPSULE ORAL EVERY 12 HOURS
Qty: 300 CAPSULE | Refills: 11 | Status: SHIPPED | OUTPATIENT
Start: 2025-03-07 | End: 2025-03-10 | Stop reason: DRUGHIGH

## 2025-03-07 RX ORDER — TACROLIMUS 1 MG/1
2 CAPSULE ORAL ONCE
Status: COMPLETED | OUTPATIENT
Start: 2025-03-07 | End: 2025-03-07

## 2025-03-07 RX ADMIN — THERA TABS 1 TABLET: TAB at 08:03

## 2025-03-07 RX ADMIN — PREDNISONE 20 MG: 20 TABLET ORAL at 08:03

## 2025-03-07 RX ADMIN — TACROLIMUS 5 MG: 1 CAPSULE ORAL at 05:03

## 2025-03-07 RX ADMIN — MUPIROCIN 1 G: 20 OINTMENT TOPICAL at 08:03

## 2025-03-07 RX ADMIN — DOCUSATE SODIUM 100 MG: 100 CAPSULE, LIQUID FILLED ORAL at 01:03

## 2025-03-07 RX ADMIN — HEPARIN SODIUM 5000 UNITS: 5000 INJECTION INTRAVENOUS; SUBCUTANEOUS at 05:03

## 2025-03-07 RX ADMIN — OXYCODONE 5 MG: 5 TABLET ORAL at 12:03

## 2025-03-07 RX ADMIN — ACETAMINOPHEN 650 MG: 325 TABLET ORAL at 01:03

## 2025-03-07 RX ADMIN — DOCUSATE SODIUM 100 MG: 100 CAPSULE, LIQUID FILLED ORAL at 08:03

## 2025-03-07 RX ADMIN — TACROLIMUS 3 MG: 1 CAPSULE ORAL at 08:03

## 2025-03-07 RX ADMIN — MYCOPHENOLATE MOFETIL 1000 MG: 250 CAPSULE ORAL at 08:03

## 2025-03-07 RX ADMIN — ACETAMINOPHEN 650 MG: 325 TABLET ORAL at 05:03

## 2025-03-07 RX ADMIN — TACROLIMUS 2 MG: 1 CAPSULE ORAL at 08:03

## 2025-03-07 RX ADMIN — SODIUM BICARBONATE 650 MG TABLET 650 MG: at 08:03

## 2025-03-07 NOTE — SUBJECTIVE & OBJECTIVE
Subjective:   History of Present Illness:  Mr. Owens is a 54 y.o. year old Black or  male with ESRD secondary to HTN presenting as primary for kidney transplant. He has been on the wait list for a kidney transplant at [facility] since 4/1/2020. Patient is currently on hemodialysis started on 4/1/2020. Patient is dialyzing on MWF schedule. Patient reports that he is tolerating dialysis well. EDW 83.5 kg. He has a LUE AV fistula. Patient denies any recent hospitalizations or ED visits. Dr James will be the primary surgeon. Pre op labs and imaging will be reviewed prior to surgery.     Mr. Owens is a 55 y.o. year old male who is status post Kidney Transplant - 3/5/2025  (#1).  His maintenance immunosuppression consists of:   Immunosuppressants (From admission, onward)      Start     Stop Route Frequency Ordered    03/07/25 1800  tacrolimus capsule 5 mg  (IP TXP KIDNEY POST-OP THYMO WITH PERIPHERAL PRECHECKED IBW)         -- Oral 2 times daily 03/07/25 0819    03/05/25 2200  mycophenolate capsule 1,000 mg  (IP TXP KIDNEY POST-OP THYMO WITH PERIPHERAL PRECHECKED IBW)         -- Oral 2 times daily 03/05/25 1721            Hospital Course:  Mr. Owens is now s/p DBD Ktx on 3/5/25 2/2 HTN (CIT 17h 16 min, CMV +/+). Straightforward care. 2 day ramires. No drains. Out of the OR ~1700 3/5. Pathway advanced POD#1. IVF discontinued as of POD#1 evening.     Interval History: POD#2 s/p kidney transplant. Ramires removed this AM, & pt is voiding without difficulty. Cr trending down with good UOP. H/H stable. (+) flatus (-) BM. Continue bowel regimen and encourage ambulation. Tolerating diet. No longer on IVF. Encourage oral hydration. Plan for discharge tomorrow morning. WCTM.        Past Medical, Surgical, Family, and Social History:   Unchanged from H&P.    Scheduled Meds:   acetaminophen  650 mg Oral Q8H    bisacodyL  10 mg Oral QHS    docusate sodium  100 mg Oral TID    famotidine  20 mg Oral QHS    heparin (porcine)   5,000 Units Subcutaneous Q8H    multivitamin  1 tablet Oral Daily    mupirocin  1 g Nasal BID    mycophenolate  1,000 mg Oral BID    predniSONE  20 mg Oral Daily    sodium bicarbonate  650 mg Oral BID    [START ON 3/15/2025] sulfamethoxazole-trimethoprim 400-80mg  1 tablet Oral Daily AM    tacrolimus  5 mg Oral BID    [START ON 3/15/2025] valGANciclovir  450 mg Oral Daily AM     Continuous Infusions:   glucagon (human recombinant)  1 mg Intramuscular Continuous PRN         PRN Meds:  Current Facility-Administered Medications:     dextrose 50%, 12.5 g, Intravenous, PRN    dextrose 50%, 12.5 g, Intravenous, PRN    dextrose 50%, 25 g, Intravenous, PRN    diphenhydrAMINE, 50 mg, Oral, Once PRN    EPINEPHrine (PF), 1 mg, Subcutaneous, Once PRN    glucagon (human recombinant), 1 mg, Intramuscular, PRN    glucagon (human recombinant), 1 mg, Intramuscular, Continuous PRN    glucose, 16 g, Oral, PRN    glucose, 16 g, Oral, PRN    glucose, 16 g, Oral, PRN    glucose, 24 g, Oral, PRN    glucose, 24 g, Oral, PRN    hydrocortisone sodium succinate, 100 mg, Intravenous, Once PRN    insulin aspart U-100, 0-10 Units, Subcutaneous, QID (AC + HS) PRN    ondansetron, 4 mg, Intravenous, Q6H PRN    oxyCODONE, 5 mg, Oral, Q6H PRN    sodium chloride 0.9%, 10 mL, Intravenous, PRN    Intake/Output - Last 3 Shifts         03/05 0700  03/06 0659 03/06 0700 03/07 0659 03/07 0700  03/08 0659    P.O. 480 2244     I.V. (mL/kg) 100 (1.2) 566.2 (6.8)     IV Piggyback 1965 442.1     Total Intake(mL/kg) 2545 (30.5) 3252.3 (38.9)     Urine (mL/kg/hr) 2035 1800 (0.9)     Total Output 2035 1800     Net +510 +1452.3                     Review of Systems   Constitutional:  Negative for chills and fever.   HENT:  Negative for facial swelling and trouble swallowing.    Eyes:  Negative for photophobia and redness.   Respiratory:  Negative for cough, shortness of breath, wheezing and stridor.    Cardiovascular:  Negative for chest pain, palpitations and leg  "swelling.   Gastrointestinal:  Positive for abdominal pain (incisional). Negative for abdominal distention, diarrhea, nausea and vomiting.   Genitourinary:  Negative for decreased urine volume and difficulty urinating.   Skin:  Positive for wound.   Allergic/Immunologic: Positive for immunocompromised state.   Neurological:  Negative for dizziness and light-headedness.   Psychiatric/Behavioral:  Negative for agitation, behavioral problems, confusion and decreased concentration.       Objective:     Vital Signs (Most Recent):  Temp: 98.7 °F (37.1 °C) (03/07/25 1133)  Pulse: 95 (03/07/25 1133)  Resp: 18 (03/07/25 1133)  BP: (!) 140/82 (03/07/25 1133)  SpO2: 97 % (03/07/25 1133) Vital Signs (24h Range):  Temp:  [98.3 °F (36.8 °C)-99 °F (37.2 °C)] 98.7 °F (37.1 °C)  Pulse:  [76-95] 95  Resp:  [15-25] 18  SpO2:  [95 %-100 %] 97 %  BP: (103-140)/(60-82) 140/82     Weight: 83.5 kg (184 lb 1.4 oz)  Height: 5' 11" (180.3 cm)  Body mass index is 25.67 kg/m².     Physical Exam  Vitals and nursing note reviewed.   Constitutional:       General: He is not in acute distress.  HENT:      Head: Normocephalic and atraumatic.   Eyes:      General: No scleral icterus.        Right eye: No discharge.         Left eye: No discharge.   Cardiovascular:      Rate and Rhythm: Normal rate and regular rhythm.   Pulmonary:      Effort: Pulmonary effort is normal. No respiratory distress.      Breath sounds: No wheezing or rales.   Abdominal:      General: Bowel sounds are normal. There is no distension.      Palpations: Abdomen is soft.      Tenderness: There is abdominal tenderness (appropriate post op). There is no guarding.      Comments: Hypoactive BS  RLQ incision MARY with staples no s/s/i   Musculoskeletal:      Right lower leg: No edema.      Left lower leg: No edema.   Skin:     General: Skin is warm and dry.   Neurological:      General: No focal deficit present.      Mental Status: He is alert and oriented to person, place, and time. "   Psychiatric:         Mood and Affect: Mood normal.         Behavior: Behavior normal.         Thought Content: Thought content normal.          Laboratory:  CBC:   Recent Labs   Lab 03/06/25  0542 03/06/25  0837 03/07/25  0543   WBC 10.87 11.99 11.85   RBC 2.73* 2.91* 2.99*   HGB 7.9* 8.7* 8.7*   HCT 25.0* 26.7* 26.4*    156 151   MCV 92 92 88   MCH 28.9 29.9 29.1   MCHC 31.6* 32.6 33.0     CMP:   Recent Labs   Lab 03/05/25  1010 03/05/25  1725 03/05/25  2256 03/06/25  0542 03/07/25  0543   GLU 85   < > 128* 338* 119*   CALCIUM 9.3   < > 8.1* 7.5* 8.8   ALBUMIN 4.0   < > 3.3* 2.9* 3.1*   PROT 8.1  --   --   --   --       < > 140 135* 136   K 3.8   < > 4.4 3.7 3.9   CO2 22*   < > 21* 19* 20*      < > 107 106 107   BUN 43*   < > 44* 43* 53*   CREATININE 10.7*   < > 10.6* 8.1* 4.7*   ALKPHOS 65  --   --   --   --    ALT 9*  --   --   --   --    AST 15  --   --   --   --     < > = values in this interval not displayed.

## 2025-03-07 NOTE — PLAN OF CARE
-AAOx4, ambulates w/standby assist  -Diya removed @ 0500   -Check flow sheet for intake/output   -D5 1/2 NS & NS and NS discontinued -Thymo 2/2 Complete  -Patient had no BM as of yet  -Accuchecks AC/HS, Bedtime (153) slides @ 151 (1 unit given)  -CBC 8.7/26.7  -Cr 8.1 yesterday trending down   -Patient ambulated in the room   -Self meds pulled 100% for AM and PM   -PRN Oxy 5 mg given overnight (pain resolved)

## 2025-03-07 NOTE — DISCHARGE SUMMARY
[facility] - Transplant Stepdown  Kidney Transplant  Discharge Summary    Patient Name: Misael Owens  MRN: [id number]  Admission Date: 3/5/2025  Hospital Length of Stay: 2 days  Discharge Date and Time:  03/07/2025 1:41 PM  Attending Physician: Allie James MD  Discharging Provider: Imelda Bass PA-C  Primary Care Provider: Greg Jernigan MD (Inactive)    HPI:   Mr. Owens is a 54 y.o. year old Black or  male with ESRD secondary to HTN presenting as primary for kidney transplant. He has been on the wait list for a kidney transplant at [facility] since 4/1/2020. Patient is currently on hemodialysis started on 4/1/2020. Patient is dialyzing on MWF schedule. Patient reports that he is tolerating dialysis well. EDW 83.5 kg. He has a LUE AV fistula. Patient denies any recent hospitalizations or ED visits. Dr James will be the primary surgeon. Pre op labs and imaging will be reviewed prior to surgery.   Procedure(s) (LRB):  TRANSPLANT, KIDNEY (N/A)     Hospital Course:      Mr. Owens is now s/p DBD Ktx on 3/5/25 2/2 HTN (CIT 17h 16 min, CMV +/+). Straightforward case. Out of the OR ~1700 3/5. Cr trending down with good UOP.  2 day ramires removed 3/7, and patient is voiding without issue. No drains. Pathway advanced POD#1. IVF discontinued as of POD#1 evening. Tolerating oral intake. (+) flatus (-) BM. Cont stool softeners at dc. H/H stable. Pain appropriately controlled. Ambulating. Patient is stable and ready for discharge at this time. Follow up to include labs and transplant clinic appointment on Monday, 3/10. Patient is in agreement with discharge from the hospital today and follow up plan.    Length of Stay was longer than expected due to: Discharge as expected    Goals of Care Treatment Preferences:  Code Status: Full Code      Final Active Diagnoses:    Diagnosis Date Noted POA    PRINCIPAL PROBLEM:  S/P kidney transplant [Z94.0] 03/06/2025 Not Applicable    Prophylactic immunotherapy [Z29.89] 03/06/2025  Not Applicable    Anemia of chronic disease [D63.8] 03/06/2025 Yes    Long-term use of immunosuppressant medication [Z79.60] 03/06/2025 Not Applicable    At risk for opportunistic infections [Z91.89] 03/06/2025 No    Steroid-induced hyperglycemia [R73.9, T38.0X5A] 03/05/2025 No    Metabolic acidosis [E87.20] 03/30/2020 Yes      Problems Resolved During this Admission:    Diagnosis Date Noted Date Resolved POA    Benign hypertension with ESRD (end-stage renal disease) [I12.0, N18.6] 03/30/2020 03/07/2025 Yes    ESRD on dialysis [N18.6, Z99.2]  03/06/2025 Not Applicable     Chronic     Treatments: See above.    Consults (From admission, onward)          Status Ordering Provider     Inpatient consult to Registered Dietitian/Nutritionist  Once        Provider:  (Not yet assigned)    Completed JONATHAN CARRILLO     Inpatient consult to Registered Dietitian/Nutritionist  Once        Provider:  (Not yet assigned)    Completed ABELARDO TERRY     Inpatient consult to Endocrinology  Once        Provider:  (Not yet assigned)    Completed ABELARDO TERRY            Pending Diagnostic Studies:       Procedure Component Value Units Date/Time    Hepatitis B Surface Antibody, Qual/Quant [[id number]] Collected: 03/05/25 1010    Order Status: Sent Lab Status: In process Updated: 03/05/25 1019    Specimen: Blood           Significant Diagnostic Studies: Labs: CMP   Recent Labs   Lab 03/05/25 2256 03/06/25  0542 03/07/25  0543    135* 136   K 4.4 3.7 3.9    106 107   CO2 21* 19* 20*   * 338* 119*   BUN 44* 43* 53*   CREATININE 10.6* 8.1* 4.7*   CALCIUM 8.1* 7.5* 8.8   ALBUMIN 3.3* 2.9* 3.1*   ANIONGAP 12 10 9   , CBC   Recent Labs   Lab 03/06/25  0542 03/06/25  0837 03/07/25  0543   WBC 10.87 11.99 11.85   HGB 7.9* 8.7* 8.7*   HCT 25.0* 26.7* 26.4*    156 151   , and INR   Lab Results   Component Value Date    INR 1.1 03/05/2025    INR 1.2 05/06/2020       Discharged Condition: stable    Disposition: Home or  Self CareDc locally    Follow Up: See above.    Patient Instructions:      Diet Adult Regular     Notify your health care provider if you experience any of the following:  increased confusion or weakness     Notify your health care provider if you experience any of the following:  persistent dizziness, light-headedness, or visual disturbances     Notify your health care provider if you experience any of the following:  worsening rash     Notify your health care provider if you experience any of the following:  severe persistent headache     Notify your health care provider if you experience any of the following:  difficulty breathing or increased cough     Notify your health care provider if you experience any of the following:  redness, tenderness, or signs of infection (pain, swelling, redness, odor or green/yellow discharge around incision site)     Notify your health care provider if you experience any of the following:  severe uncontrolled pain     Notify your health care provider if you experience any of the following:  persistent nausea and vomiting or diarrhea     Notify your health care provider if you experience any of the following:  temperature >100.4     Activity as tolerated     Medications:  Reconciled Home Medications:      Medication List        START taking these medications      famotidine 20 MG tablet  Commonly known as: PEPCID  Take 1 tablet (20 mg total) by mouth every evening.     multivitamin Tab  Take 1 tablet by mouth once daily.  Start taking on: March 8, 2025     mycophenolate 250 mg Cap  Commonly known as: CELLCEPT  Take 4 capsules (1,000 mg total) by mouth 2 (two) times daily.     oxyCODONE 5 MG immediate release tablet  Commonly known as: ROXICODONE  Take 1 tablet (5 mg total) by mouth every 6 (six) hours as needed for Pain.     predniSONE 5 MG tablet  Commonly known as: DELTASONE  Take by mouth everyday: 20 mg 3/7-3/13; 15 mg 3/14-3/20; 10 mg 3/21/2025-3/27/2025; 5 mg thereafter      sulfamethoxazole-trimethoprim 400-80mg 400-80 mg per tablet  Commonly known as: BACTRIM,SEPTRA  Take 1 tablet by mouth every Mon, Wed, Fri. STOP 9/2/25     tacrolimus 1 MG Cap  Commonly known as: PROGRAF  Take 5 capsules (5 mg total) by mouth every 12 (twelve) hours.     valGANciclovir 450 mg Tab  Commonly known as: VALCYTE  Take 1 tablet (450 mg total) by mouth every Mon, Wed, Fri. STOP 6/4/25            STOP taking these medications      acetaminophen 325 MG tablet  Commonly known as: TYLENOL     calcitRIOL 0.5 MCG Cap  Commonly known as: ROCALTROL     ergocalciferol 50,000 unit Cap  Commonly known as: ERGOCALCIFEROL     furosemide 40 MG tablet  Commonly known as: LASIX     labetaloL 200 MG tablet  Commonly known as: NORMODYNE     RENVELA 800 mg Tab  Generic drug: sevelamer carbonate            Time spent caring for patient (Greater than 1/2 spent in direct face-to-face contact): > 30 minutes    Imelda Bass PA-C  Kidney Transplant  [facility] - Trans[facility] Step[facility]

## 2025-03-07 NOTE — PROGRESS NOTES
Patient AAOx4. OOB independently. VSS. PIV removed. Pharmacy delivered home medications. Transplant team completed education. Discharge papers printed and discussed, pt has no further questions. Transport escorted him off unit via wheelchair at this time.

## 2025-03-07 NOTE — SUBJECTIVE & OBJECTIVE
"Interval HPI:   Overnight events: No acute events overnight. Patient in room 83464/52852 A. Blood glucose stable. BG at and above goal on current insulin regimen (SSI ). Steroid use- Prednisone 20 mg. 2 Days Post-Op  Renal function- Abnormal - Creatinine 4.7   Vasopressors-  None       Endocrine will continue to follow and manage insulin orders inpatient.         Diet Adult Regular     Eatin%  Nausea: No  Hypoglycemia and intervention: No  Fever: No  TPN and/or TF: No    /69 (Patient Position: Lying)   Pulse 83   Temp 98.3 °F (36.8 °C) (Oral)   Resp 18   Ht 5' 11" (1.803 m)   Wt 83.5 kg (184 lb 1.4 oz)   SpO2 95%   BMI 25.67 kg/m²     Labs Reviewed and Include    Recent Labs   Lab 25  0543   *   CALCIUM 8.8   ALBUMIN 3.1*      K 3.9   CO2 20*      BUN 53*   CREATININE 4.7*     Lab Results   Component Value Date    WBC 11.85 2025    HGB 8.7 (L) 2025    HCT 26.4 (L) 2025    MCV 88 2025     2025     No results for input(s): "TSH", "FREET4" in the last 168 hours.  Lab Results   Component Value Date    HGBA1C 5.1 2024       Nutritional status:   Body mass index is 25.67 kg/m².  Lab Results   Component Value Date    ALBUMIN 3.1 (L) 2025    ALBUMIN 2.9 (L) 2025    ALBUMIN 3.3 (L) 2025     No results found for: "PREALBUMIN"    Estimated Creatinine Clearance: 18.9 mL/min (A) (based on SCr of 4.7 mg/dL (H)).    Accu-Checks  Recent Labs     25  1732 25  0940 25  1244 25  1741 25  2047 25  0825   POCTGLUCOSE 109 161* 120* 126* 153* 113*       Current Medications and/or Treatments Impacting Glycemic Control  Immunotherapy:    Immunosuppressants           Stop Route Frequency     tacrolimus capsule 5 mg         -- Oral 2 times daily     mycophenolate capsule 1,000 mg         -- Oral 2 times daily          Steroids:   Hormones (From admission, onward)      Start     Stop Route Frequency " Ordered    03/07/25 0900  predniSONE tablet 20 mg  (IP TXP KIDNEY POST-OP THYMO WITH PERIPHERAL PRECHECKED IBW)         -- Oral Daily 03/05/25 1721    03/05/25 1821  hydrocortisone sodium succinate injection 100 mg  (IP TXP KIDNEY POST-OP THYMO WITH PERIPHERAL PRECHECKED IBW)         08/01/36 1021 IV Once as needed 03/05/25 1721          Pressors:    Autonomic Drugs (From admission, onward)      Start     Stop Route Frequency Ordered    03/05/25 1821  EPINEPHrine (PF) injection 1 mg  (IP TXP KIDNEY POST-OP THYMO WITH PERIPHERAL PRECHECKED IBW)         08/01/36 1021 SubQ Once as needed 03/05/25 1721          Hyperglycemia/Diabetes Medications:   Antihyperglycemics (From admission, onward)      Start     Stop Route Frequency Ordered    03/05/25 1537  insulin aspart U-100 pen 0-10 Units         -- SubQ Before meals & nightly PRN 03/05/25 1437

## 2025-03-07 NOTE — PROGRESS NOTES
DISCHARGE NOTE:    Misael Owens is a 55 y.o. male s/p   RIGHT KIDNEY     Donation after Brain Death   transplant on 3/5/2025 (Kidney) for ESRD secondary to Malignant Hypertension.      Past Medical History:   Diagnosis Date    CKD (chronic kidney disease), stage IV     due to hypertension    ESRD on dialysis     Hypertension     Secondary hyperparathyroidism     Vitamin D deficiency        Hospital Course: Uncomplicated, discharging POD 2.     Allergies:   Review of patient's allergies indicates:   Allergen Reactions    Lisinopril Swelling     Facial swelling       Patient Pharmacy: [facility]    Discharge Medications:     Medication List        START taking these medications      famotidine 20 MG tablet  Commonly known as: PEPCID  Take 1 tablet (20 mg total) by mouth every evening.     multivitamin Tab  Take 1 tablet by mouth once daily.  Start taking on: March 8, 2025     mycophenolate 250 mg Cap  Commonly known as: CELLCEPT  Take 4 capsules (1,000 mg total) by mouth 2 (two) times daily.     oxyCODONE 5 MG immediate release tablet  Commonly known as: ROXICODONE  Take 1 tablet (5 mg total) by mouth every 6 (six) hours as needed for Pain.     predniSONE 5 MG tablet  Commonly known as: DELTASONE  Take by mouth everyday: 20 mg 3/7-3/13; 15 mg 3/14-3/20; 10 mg 3/21/2025-3/27/2025; 5 mg thereafter     sulfamethoxazole-trimethoprim 400-80mg 400-80 mg per tablet  Commonly known as: BACTRIM,SEPTRA  Take 1 tablet by mouth every Mon, Wed, Fri. STOP 9/2/25     tacrolimus 1 MG Cap  Commonly known as: PROGRAF  Take 5 capsules (5 mg total) by mouth every 12 (twelve) hours.     valGANciclovir 450 mg Tab  Commonly known as: VALCYTE  Take 1 tablet (450 mg total) by mouth every Mon, Wed, Fri. STOP 6/4/25            STOP taking these medications      acetaminophen 325 MG tablet  Commonly known as: TYLENOL     calcitRIOL 0.5 MCG Cap  Commonly known as: ROCALTROL     ergocalciferol 50,000 unit Cap  Commonly known as: ERGOCALCIFEROL      furosemide 40 MG tablet  Commonly known as: LASIX     labetaloL 200 MG tablet  Commonly known as: NORMODYNE     RENVELA 800 mg Tab  Generic drug: sevelamer carbonate               Where to Get Your Medications        These medications were sent to [facility]  [address] [facility] [address]      Hours: Always Open Phone: [phone]   famotidine 20 MG tablet  mycophenolate 250 mg Cap  oxyCODONE 5 MG immediate release tablet  predniSONE 5 MG tablet  sulfamethoxazole-trimethoprim 400-80mg 400-80 mg per tablet  tacrolimus 1 MG Cap  valGANciclovir 450 mg Tab       You can get these medications from any pharmacy    You don't need a prescription for these medications  multivitamin Tab          Pharmacy Interventions/Recommendations:  1) Transplant Immunosuppression: Induction Thymo, and maintenance tac/mmf/pred taper    2) Opportunistic Infection prophylaxis: Bactrim x 6 mo; Valcyte x 3 mo    3) Osteoporosis Prevention measures (liver txp): n/a    4) Patient Counseling/Education: Demonstrated the use of the BP cuff, thermometer.    5) Follow-Up/Discharge Needs:    -adjust bactrim/valcyte has renal function improves    6) Patient Assistance Information: none    7) The following medications have been placed on HOLD and should be restarted in the outpatient setting (when appropriate): none    Misael and his caregiver verbalized their understanding and had the opportunity to ask questions.

## 2025-03-07 NOTE — ASSESSMENT & PLAN NOTE
Titrate insulin slowly to avoid hypoglycemia as the risk of hypoglycemia increases with decreased creatinine clearance.  Estimated Creatinine Clearance: 18.9 mL/min (A) (based on SCr of 4.7 mg/dL (H)).\  Optimize BG control to improve wound healing  Glucocorticoids markedly increase glucose levels. Expect the steroid taper will help glucose control. ]

## 2025-03-07 NOTE — PROGRESS NOTES
[facility]  Kidney Transplant  Progress Note      Reason for Follow-up: Reassessment of Kidney Transplant - 3/5/2025  (#1) recipient and management of immunosuppression.    ORGAN:   RIGHT KIDNEY    Donor Type:   Donation after Brain Death    Donor CMV Status: Positive  Donor HBcAB:Positive  Donor HCV Status:Negative  Donor HBV MINAL:   Donor HCV MINAL: Negative      Subjective:   History of Present Illness:  Mr. Owens is a 54 y.o. year old Black or  male with ESRD secondary to HTN presenting as primary for kidney transplant. He has been on the wait list for a kidney transplant at [facility] since 4/1/2020. Patient is currently on hemodialysis started on 4/1/2020. Patient is dialyzing on MWF schedule. Patient reports that he is tolerating dialysis well. EDW 83.5 kg. He has a LUE AV fistula. Patient denies any recent hospitalizations or ED visits. Dr James will be the primary surgeon. Pre op labs and imaging will be reviewed prior to surgery.     Mr. Owens is a 55 y.o. year old male who is status post Kidney Transplant - 3/5/2025  (#1).  His maintenance immunosuppression consists of:   Immunosuppressants (From admission, onward)      Start     Stop Route Frequency Ordered    03/07/25 1800  tacrolimus capsule 5 mg  (IP TXP KIDNEY POST-OP THYMO WITH PERIPHERAL PRECHECKED IBW)         -- Oral 2 times daily 03/07/25 0819    03/05/25 2200  mycophenolate capsule 1,000 mg  (IP TXP KIDNEY POST-OP THYMO WITH PERIPHERAL PRECHECKED IBW)         -- Oral 2 times daily 03/05/25 1721            Hospital Course:  Mr. Owens is now s/p DBD Ktx on 3/5/25 2/2 HTN (CIT 17h 16 min, CMV +/+). Straightforward care. 2 day ramires. No drains. Out of the OR ~1700 3/5. Pathway advanced POD#1. IVF discontinued as of POD#1 evening.     Interval History: POD#2 s/p kidney transplant. Ramires removed this AM, & pt is voiding without difficulty. Cr trending down with good UOP. H/H stable. (+) flatus (-) BM. Continue bowel  regimen and encourage ambulation. Tolerating diet. No longer on IVF. Encourage oral hydration. Plan for discharge tomorrow morning. WCTM.        Past Medical, Surgical, Family, and Social History:   Unchanged from H&P.    Scheduled Meds:   acetaminophen  650 mg Oral Q8H    bisacodyL  10 mg Oral QHS    docusate sodium  100 mg Oral TID    famotidine  20 mg Oral QHS    heparin (porcine)  5,000 Units Subcutaneous Q8H    multivitamin  1 tablet Oral Daily    mupirocin  1 g Nasal BID    mycophenolate  1,000 mg Oral BID    predniSONE  20 mg Oral Daily    sodium bicarbonate  650 mg Oral BID    [START ON 3/15/2025] sulfamethoxazole-trimethoprim 400-80mg  1 tablet Oral Daily AM    tacrolimus  5 mg Oral BID    [START ON 3/15/2025] valGANciclovir  450 mg Oral Daily AM     Continuous Infusions:   glucagon (human recombinant)  1 mg Intramuscular Continuous PRN         PRN Meds:  Current Facility-Administered Medications:     dextrose 50%, 12.5 g, Intravenous, PRN    dextrose 50%, 12.5 g, Intravenous, PRN    dextrose 50%, 25 g, Intravenous, PRN    diphenhydrAMINE, 50 mg, Oral, Once PRN    EPINEPHrine (PF), 1 mg, Subcutaneous, Once PRN    glucagon (human recombinant), 1 mg, Intramuscular, PRN    glucagon (human recombinant), 1 mg, Intramuscular, Continuous PRN    glucose, 16 g, Oral, PRN    glucose, 16 g, Oral, PRN    glucose, 16 g, Oral, PRN    glucose, 24 g, Oral, PRN    glucose, 24 g, Oral, PRN    hydrocortisone sodium succinate, 100 mg, Intravenous, Once PRN    insulin aspart U-100, 0-10 Units, Subcutaneous, QID (AC + HS) PRN    ondansetron, 4 mg, Intravenous, Q6H PRN    oxyCODONE, 5 mg, Oral, Q6H PRN    sodium chloride 0.9%, 10 mL, Intravenous, PRN    Intake/Output - Last 3 Shifts         03/05 0700  03/06 0659 03/06 0700 03/07 0659 03/07 0700  03/08 0659    P.O. 480 2244     I.V. (mL/kg) 100 (1.2) 566.2 (6.8)     IV Piggyback 1965 442.1     Total Intake(mL/kg) 2545 (30.5) 3252.3 (38.9)     Urine (mL/kg/hr) 2035 1800 (0.9)      "Total Output 2035 1800     Net +510 +1452.3                     Review of Systems   Constitutional:  Negative for chills and fever.   HENT:  Negative for facial swelling and trouble swallowing.    Eyes:  Negative for photophobia and redness.   Respiratory:  Negative for cough, shortness of breath, wheezing and stridor.    Cardiovascular:  Negative for chest pain, palpitations and leg swelling.   Gastrointestinal:  Positive for abdominal pain (incisional). Negative for abdominal distention, diarrhea, nausea and vomiting.   Genitourinary:  Negative for decreased urine volume and difficulty urinating.   Skin:  Positive for wound.   Allergic/Immunologic: Positive for immunocompromised state.   Neurological:  Negative for dizziness and light-headedness.   Psychiatric/Behavioral:  Negative for agitation, behavioral problems, confusion and decreased concentration.       Objective:     Vital Signs (Most Recent):  Temp: 98.7 °F (37.1 °C) (03/07/25 1133)  Pulse: 95 (03/07/25 1133)  Resp: 18 (03/07/25 1133)  BP: (!) 140/82 (03/07/25 1133)  SpO2: 97 % (03/07/25 1133) Vital Signs (24h Range):  Temp:  [98.3 °F (36.8 °C)-99 °F (37.2 °C)] 98.7 °F (37.1 °C)  Pulse:  [76-95] 95  Resp:  [15-25] 18  SpO2:  [95 %-100 %] 97 %  BP: (103-140)/(60-82) 140/82     Weight: 83.5 kg (184 lb 1.4 oz)  Height: 5' 11" (180.3 cm)  Body mass index is 25.67 kg/m².     Physical Exam  Vitals and nursing note reviewed.   Constitutional:       General: He is not in acute distress.  HENT:      Head: Normocephalic and atraumatic.   Eyes:      General: No scleral icterus.        Right eye: No discharge.         Left eye: No discharge.   Cardiovascular:      Rate and Rhythm: Normal rate and regular rhythm.   Pulmonary:      Effort: Pulmonary effort is normal. No respiratory distress.      Breath sounds: No wheezing or rales.   Abdominal:      General: Bowel sounds are normal. There is no distension.      Palpations: Abdomen is soft.      Tenderness: There is " abdominal tenderness (appropriate post op). There is no guarding.      Comments: Hypoactive BS  RLQ incision MARY with staples no s/s/i   Musculoskeletal:      Right lower leg: No edema.      Left lower leg: No edema.   Skin:     General: Skin is warm and dry.   Neurological:      General: No focal deficit present.      Mental Status: He is alert and oriented to person, place, and time.   Psychiatric:         Mood and Affect: Mood normal.         Behavior: Behavior normal.         Thought Content: Thought content normal.          Laboratory:  CBC:   Recent Labs   Lab 03/06/25  0542 03/06/25  0837 03/07/25  0543   WBC 10.87 11.99 11.85   RBC 2.73* 2.91* 2.99*   HGB 7.9* 8.7* 8.7*   HCT 25.0* 26.7* 26.4*    156 151   MCV 92 92 88   MCH 28.9 29.9 29.1   MCHC 31.6* 32.6 33.0     CMP:   Recent Labs   Lab 03/05/25  1010 03/05/25  1725 03/05/25  2256 03/06/25  0542 03/07/25  0543   GLU 85   < > 128* 338* 119*   CALCIUM 9.3   < > 8.1* 7.5* 8.8   ALBUMIN 4.0   < > 3.3* 2.9* 3.1*   PROT 8.1  --   --   --   --       < > 140 135* 136   K 3.8   < > 4.4 3.7 3.9   CO2 22*   < > 21* 19* 20*      < > 107 106 107   BUN 43*   < > 44* 43* 53*   CREATININE 10.7*   < > 10.6* 8.1* 4.7*   ALKPHOS 65  --   --   --   --    ALT 9*  --   --   --   --    AST 15  --   --   --   --     < > = values in this interval not displayed.       Assessment/Plan:     * S/P kidney transplant  - S/p DBD Ktx on 3/5/25 2/2 HTN (CIT 17h 16 min, CMV +/+). Straightforward care. 2 day ramires. No drains. Out of the OR ~1700 3/5.   - Cr trending down, making ~100 cc urine/hour.   - H/H decreased POD#1, but repeat improved and now stable. HDS.   - Tolerating PO intake and motivated to move. Pain appropriately controlled.   - Pathway advanced; MIVF dc'd POD#1 PM. Encourage oral hydration.  - (+) flatus (-) BM. Cont bowel regimen and encourage OOBTC/ambulation.   - Ramires removed 3/7; voiding without difficulty  - Plan for dc 3/8    Prophylactic  "immunotherapy  - Continue Prograf. Monitor trough daily and adjust dose as needed to achieve therapeutic level.  - Continue Cellcept.  - Continue steroids.      At risk for opportunistic infections  - Bactrim for PCP ppx  - Valcyte for CMV ppx      Long-term use of immunosuppressant medication  - See "prophylactic immunotherapy."      Anemia of chronic disease  - Due to renal disease and surgery.  - H/H stable.  - HDS.  - Cont to monitor with daily cbc.    Steroid-induced hyperglycemia  - Endocrine following, appreciate assistance.      Metabolic acidosis  - Continue oral bicarb.          Discharge Planning:  Dc tomorrow.    Medical decision making for this encounter includes review of pertinent labs and diagnostic studies, assessment and planning, discussions with consulting providers, discussion with patient/family, and participation in multidisciplinary rounds. Time spent caring for patient: 60 minutes    Imelda Bass PA-C  Kidney Transplant  [facility]  "

## 2025-03-07 NOTE — PROGRESS NOTES
"[facility] - Transplant Stepdown  Adult Nutrition  Progress Note    SUMMARY       Recommendations    1. Continue Regular, low phosphorus diet   2. RD following     Goals: Meet % of EEN/EPN by RD f/u date  Nutrition Goal Status: progressing   Communication of RD Recs: POC    Nutrition Discharge Planning    Nutrition Discharge Planning: Pt w/ no additional questions to post transplant guidelines.     Assessment and Plan    Nutrition Problem  Increased nutrient needs (protein, calorie)     Related to (etiology):  Increased metabolic stress associated with transplant     Signs and Symptoms (as evidenced by):  S/p kidney transplantation and presence of surgical wound     Interventions(treatment strategy):  Collaboration of nutrition care w/ other providers     Nutrition Diagnosis Status:  Continues      Reason for Assessment    Reason For Assessment: consult  Diagnosis: other (see comments) (s/p kidney transplant)  General Information Comments: RD consulted for s/p kidney transplant. Spoke w/ pt at bedside, reports a good appetite currenlty and reports good appetite PTA. Pt denies any difficulties w/ chewing/swallowing.  NFPE not warranted as pt is well nourished w/ no s/s of malnutrition at this time. RD following. LBM noted-3/5  Nutrition Related Social Determinants of Health: SDOH: None Identified      Nutrition/Diet History    Spiritual, Cultural Beliefs, Cheondoism Practices, Values that Affect Care: no  Food Allergies: NKFA    Anthropometrics    Height: 5' 11" (180.3 cm)  Height (inches): 71 in  Height Method: Stated  Weight: 83.5 kg (184 lb 1.4 oz)  Weight (lb): 184.09 lb  Weight Method: Standard Scale  Ideal Body Weight (IBW), Male: 172 lb  % Ideal Body Weight, Male (lb): 107.03 %  BMI (Calculated): 25.7  BMI Grade: 25 - 29.9 - overweight       Lab/Procedures/Meds    Pertinent Labs Reviewed: reviewed  Pertinent Labs Comments: GFR 6.5  Pertinent Medications Reviewed: reviewed  Pertinent Medications Comments: " tacrolimus    Estimated/Assessed Needs    Weight Used For Calorie Calculations: 83.5 kg (184 lb 1.4 oz)  Energy Calorie Requirements (kcal): 2087-2922  Energy Need Method: Kcal/kg (25-35 kcal/kg)  Protein Requirements:  g (0.8-1.5 g/kg)  Weight Used For Protein Calculations: 83.5 kg (184 lb 1.4 oz)        RDA Method (mL): 2087         Nutrition Prescription Ordered    Current Diet Order: Regular, low phosphorus    Evaluation of Received Nutrient/Fluid Intake    I/O: -  Energy Calories Required: meeting needs  Protein Required: meeting needs  Fluid Required: meeting needs  Total Fluid Intake (mL/kg): 1 ml or fluid per MD  Tolerance: tolerating  % Intake of Estimated Energy Needs: 75%  % Meal Intake: 75%    Nutrition Risk    Level of Risk/Frequency of Follow-up: low ((1-2x/week))     Monitor and Evaluation    Monitor and Evaluation: Energy intake, Food and beverage intake     Nutrition Follow-Up    RD Follow-up?: Yes

## 2025-03-07 NOTE — PROGRESS NOTES
Discharge Note:    ISREAL spoke with pt and sister Gertrude to discuss discharge plan and to assess needs. Pt reports coping adequately at this time and presents as alert and oriented x4 and states is in high spirits.  Pt scheduled to discharge Saturday to pt's home under the care of Gertrude with no needs at this time. SW reviewed discharge plan with pt. Pt aware of and involved in discharge plan. Pt's CASTRO to transport pt.  Pt denies having any concerns at this time as well. Pt verbalized understanding and agreement with information reviewed, social work availability, and how to assess available resources as needed. SW remains available.

## 2025-03-07 NOTE — PLAN OF CARE
Recommendations     1. Continue Regular, low phosphorus diet   2. RD following     Goals: Meet % of EEN/EPN by RD f/u date  Nutrition Goal Status: progressing   Communication of RD Recs: POC

## 2025-03-07 NOTE — ASSESSMENT & PLAN NOTE
- Endocrine following, appreciate assistance.     Chronic health problem, stable, continue atorvastatin 20 mg daily.  Recheck labs in 1 year.

## 2025-03-07 NOTE — ASSESSMENT & PLAN NOTE
- S/p DBD Ktx on 3/5/25 2/2 HTN (CIT 17h 16 min, CMV +/+). Straightforward care. 2 day ramires. No drains. Out of the OR ~1700 3/5.   - Cr trending down, making ~100 cc urine/hour.   - H/H decreased POD#1, but repeat improved and now stable. HDS.   - Tolerating PO intake and motivated to move. Pain appropriately controlled.   - Pathway advanced; MIVF dc'd POD#1 PM. Encourage oral hydration.  - (+) flatus (-) BM. Cont bowel regimen and encourage OOBTC/ambulation.   - Ramires removed 3/7; voiding without difficulty  - Plan for dc 3/8

## 2025-03-07 NOTE — PROGRESS NOTES
Transplant Coordinator  3/5-3/7/25    Pt admitted for a cadaveric kidney transplant.ESRD 2/2 HTN. KDPI 48%, Thymo induction, CMV ++    Good kidney function and UOP. Cr down from 10.7--->4.7 at time of d/c    RLQ incision MARY with staples  Keane removed prior to d/c    Recipient HBsAb+, Donor HBcAb+  No need for medication but protocol labs will be drawn.    Labs 3/10/25 and RTC to meet with coordinator/pharmD

## 2025-03-07 NOTE — PROGRESS NOTES
EDUCATION NOTE:    Met with Misael Owens and his caregivers to provide teaching re: immunosuppressant medications.  Reviewed medication section of the Kidney Transplant Education book that was provided.  Emphasized the importance of compliance, role of the blue medication card, concerns for drug interactions, and process of obtaining refills.  Counseled regarding Prograf, Cellcept , prednisone, including directions for use, monitoring, how to handle missed doses, and side effects.  He verbalized understanding and had the opportunity to ask questions.

## 2025-03-07 NOTE — ASSESSMENT & PLAN NOTE
Endocrinology consulted for BG management.   BG goal 140-180    - Novolog (Insulin Aspart) prn for BG excursions [facility] [place] (150/25)  - BG checks AC/HS  - Hypoglycemia protocol in place    ** Please notify Endocrine for any change and/or advance in diet**  ** Please call Endocrine for any BG related issues **    Discharge Planning:   TBD. Please notify endocrinology prior to discharge.

## 2025-03-07 NOTE — PROGRESS NOTES
Transplant Teaching Book given to patient, Misael Owens, on 3/6/2025  During the course of the hospital stay the patient received information regarding kidney transplant. Teaching and instruction were completed.  Areas that were discussed included: how to contact the Transplant Team, the importance of measuring intake of fluids and urine output, and monitoring vital signs such as blood pressure, temperature, and daily weights.  Parameters for which to report abnormal findings were given.  Appointment were provided along with the rational for the importance of lab work and clinic visits.  A written medication list was provided.  The importance of immunosuppressive medications, their common side effects, and treatment to prevent or minimize side effects has been reviewed.  Signs and symptoms of rejection and infection along with various treatments were reviewed.  The need to avoid infection was discussed.  Wound care and special consideration regarding activities of daily living were explained.  Written and verbal teaching of the above information was given.     Discussed with the patient and caregiver the importance of maintaining COVID-19 precautions; wearing a mask, good handwashing, and social distancing.  Also, to report any signs or symptoms (fever, difficulty breathing, loss of taste/smell, etc.), suspected exposure, or COVID testing, immediately to the transplant program.     Education was provided to the patient and his sister, Gertrude.

## 2025-03-07 NOTE — PROGRESS NOTES
"[facility]  Endocrinology  Progress Note    Admit Date: 3/5/2025     Reason for Consult: Management of Hyperglycemia     Surgical Procedure and Date: pending kidney transplant on 2025    Patient is not diabetic and does not currently take any oral/injectable antidiabetic/hypoglycemic medications.   Hemoglobin A1C   Date Value Ref Range Status   2024 5.1 4.8 - 5.9 % Final   11/15/2019 5.2 4.0 - 5.6 % Final     Comment:     ADA Screening Guidelines:  5.7-6.4%  Consistent with prediabetes  >or=6.5%  Consistent with diabetes  High levels of fetal hemoglobin interfere with the HbA1C  assay. Heterozygous hemoglobin variants (HbS, HgC, etc)do  not significantly interfere with this assay.   However, presence of multiple variants may affect accuracy.            HPI: Mr. Owens is a 54 y.o. year old Black or  male with ESRD secondary to HTN presenting as primary for kidney transplant. Endocrine consulted to manage hyperglycemia in the context of high-dose steroids.           Interval HPI:   Overnight events: No acute events overnight. Patient in room 44661/41855 A. Blood glucose stable. BG at and above goal on current insulin regimen (SSI ). Steroid use- Prednisone 20 mg. 2 Days Post-Op  Renal function- Abnormal - Creatinine 4.7   Vasopressors-  None       Endocrine will continue to follow and manage insulin orders inpatient.         Diet Adult Regular     Eatin%  Nausea: No  Hypoglycemia and intervention: No  Fever: No  TPN and/or TF: No    /69 (Patient Position: Lying)   Pulse 83   Temp 98.3 °F (36.8 °C) (Oral)   Resp 18   Ht 5' 11" (1.803 m)   Wt 83.5 kg (184 lb 1.4 oz)   SpO2 95%   BMI 25.67 kg/m²     Labs Reviewed and Include    Recent Labs   Lab 25  0543   *   CALCIUM 8.8   ALBUMIN 3.1*      K 3.9   CO2 20*      BUN 53*   CREATININE 4.7*     Lab Results   Component Value Date    WBC 11.85 2025    HGB 8.7 (L) 2025    " "HCT 26.4 (L) 03/07/2025    MCV 88 03/07/2025     03/07/2025     No results for input(s): "TSH", "FREET4" in the last 168 hours.  Lab Results   Component Value Date    HGBA1C 5.1 12/18/2024       Nutritional status:   Body mass index is 25.67 kg/m².  Lab Results   Component Value Date    ALBUMIN 3.1 (L) 03/07/2025    ALBUMIN 2.9 (L) 03/06/2025    ALBUMIN 3.3 (L) 03/05/2025     No results found for: "PREALBUMIN"    Estimated Creatinine Clearance: 18.9 mL/min (A) (based on SCr of 4.7 mg/dL (H)).    Accu-Checks  Recent Labs     03/05/25  1732 03/06/25  0940 03/06/25  1244 03/06/25  1741 03/06/25  2047 03/07/25  0825   POCTGLUCOSE 109 161* 120* 126* 153* 113*       Current Medications and/or Treatments Impacting Glycemic Control  Immunotherapy:    Immunosuppressants           Stop Route Frequency     tacrolimus capsule 5 mg         -- Oral 2 times daily     mycophenolate capsule 1,000 mg         -- Oral 2 times daily          Steroids:   Hormones (From admission, onward)      Start     Stop Route Frequency Ordered    03/07/25 0900  predniSONE tablet 20 mg  (IP TXP KIDNEY POST-OP THYMO WITH PERIPHERAL PRECHECKED IBW)         -- Oral Daily 03/05/25 1721    03/05/25 1821  hydrocortisone sodium succinate injection 100 mg  (IP TXP KIDNEY POST-OP THYMO WITH PERIPHERAL PRECHECKED IBW)         08/01/36 1021 IV Once as needed 03/05/25 1721          Pressors:    Autonomic Drugs (From admission, onward)      Start     Stop Route Frequency Ordered    03/05/25 1821  EPINEPHrine (PF) injection 1 mg  (IP TXP KIDNEY POST-OP THYMO WITH PERIPHERAL PRECHECKED IBW)         08/01/36 1021 SubQ Once as needed 03/05/25 1721          Hyperglycemia/Diabetes Medications:   Antihyperglycemics (From admission, onward)      Start     Stop Route Frequency Ordered    03/05/25 1537  insulin aspart U-100 pen 0-10 Units         -- SubQ Before meals & nightly PRN 03/05/25 1437            ASSESSMENT and PLAN    Cardiac/Vascular  Benign hypertension " with ESRD (end-stage renal disease)  Uncontrolled HTN can worsen insulin resistance.         Renal/  * S/P kidney transplant  Titrate insulin slowly to avoid hypoglycemia as the risk of hypoglycemia increases with decreased creatinine clearance.  Estimated Creatinine Clearance: 18.9 mL/min (A) (based on SCr of 4.7 mg/dL (H)).\  Optimize BG control to improve wound healing  Glucocorticoids markedly increase glucose levels. Expect the steroid taper will help glucose control. ]      Endocrine  Steroid-induced hyperglycemia  Endocrinology consulted for BG management.   BG goal 140-180    - Novolog (Insulin Aspart) prn for BG excursions [facility] [place] SSI (150/25)  - BG checks AC/HS  - Hypoglycemia protocol in place    ** Please notify Endocrine for any change and/or advance in diet**  ** Please call Endocrine for any BG related issues **    Discharge Planning:   TBD. Please notify endocrinology prior to discharge.             Kingsley Solomon, DNP, FNP  Endocrinology  [facility]

## 2025-03-07 NOTE — PROGRESS NOTES
"Updated Discharge Note:    SW met with pt to discuss discharge plan and to assess needs. Pt reports coping adequately at this time and presents as alert and oriented x4 and states is in high spirits. Pt stressed "Gertrude is not the boss. I am fine with leaving today and I feel good. The house is fine and my niece will come get me. I have a lot of options for help, I don't need her. She just wants to insert herself in my business. I can go today and I am okay with that. I thank ya'll for all you've done but it's okay for me to go". Pt scheduled to discharge today to pt's home under the care of family with no needs at this time.     SW reviewed discharge plan with pt. Pt aware of and involved in discharge plan. Pt's CASTRO and niece to provide transportation during recovery process. Pt denies having any concerns at this time as well. Pt verbalized understanding and agreement with information reviewed, social work availability, and how to assess available resources as needed. SW remains available.     "

## 2025-03-10 ENCOUNTER — CLINICAL SUPPORT (OUTPATIENT)
Dept: TRANSPLANT | Facility: CLINIC | Age: 55
End: 2025-03-10
Payer: MEDICARE

## 2025-03-10 ENCOUNTER — RESULTS FOLLOW-UP (OUTPATIENT)
Dept: TRANSPLANT | Facility: CLINIC | Age: 55
End: 2025-03-10
Payer: MEDICARE

## 2025-03-10 ENCOUNTER — LAB VISIT (OUTPATIENT)
Dept: LAB | Facility: HOSPITAL | Age: 55
End: 2025-03-10
Attending: INTERNAL MEDICINE
Payer: MEDICARE

## 2025-03-10 ENCOUNTER — TELEPHONE (OUTPATIENT)
Dept: TRANSPLANT | Facility: CLINIC | Age: 55
End: 2025-03-10
Payer: MEDICARE

## 2025-03-10 VITALS
HEART RATE: 95 BPM | WEIGHT: 192 LBS | OXYGEN SATURATION: 99 % | TEMPERATURE: 95 F | SYSTOLIC BLOOD PRESSURE: 154 MMHG | HEIGHT: 71 IN | DIASTOLIC BLOOD PRESSURE: 82 MMHG | BODY MASS INDEX: 26.88 KG/M2

## 2025-03-10 DIAGNOSIS — Z94.0 KIDNEY REPLACED BY TRANSPLANT: ICD-10-CM

## 2025-03-10 LAB
25(OH)D3+25(OH)D2 SERPL-MCNC: 24 NG/ML (ref 30–96)
ALBUMIN SERPL BCP-MCNC: 3.4 G/DL (ref 3.5–5.2)
ANION GAP SERPL CALC-SCNC: 5 MMOL/L (ref 8–16)
BASOPHILS # BLD AUTO: 0.04 K/UL (ref 0–0.2)
BASOPHILS NFR BLD: 0.7 % (ref 0–1.9)
BUN SERPL-MCNC: 29 MG/DL (ref 6–20)
CALCIUM SERPL-MCNC: 9.1 MG/DL (ref 8.7–10.5)
CHLORIDE SERPL-SCNC: 109 MMOL/L (ref 95–110)
CO2 SERPL-SCNC: 25 MMOL/L (ref 23–29)
CREAT SERPL-MCNC: 1.9 MG/DL (ref 0.5–1.4)
DIFFERENTIAL METHOD BLD: ABNORMAL
EOSINOPHIL # BLD AUTO: 0.5 K/UL (ref 0–0.5)
EOSINOPHIL NFR BLD: 9.1 % (ref 0–8)
ERYTHROCYTE [DISTWIDTH] IN BLOOD BY AUTOMATED COUNT: 14.2 % (ref 11.5–14.5)
EST. GFR  (NO RACE VARIABLE): 41.1 ML/MIN/1.73 M^2
GLUCOSE SERPL-MCNC: 91 MG/DL (ref 70–110)
HCT VFR BLD AUTO: 27.5 % (ref 40–54)
HGB BLD-MCNC: 8.8 G/DL (ref 14–18)
IMM GRANULOCYTES # BLD AUTO: 0.05 K/UL (ref 0–0.04)
IMM GRANULOCYTES NFR BLD AUTO: 0.9 % (ref 0–0.5)
LYMPHOCYTES # BLD AUTO: 0.1 K/UL (ref 1–4.8)
LYMPHOCYTES NFR BLD: 1.4 % (ref 18–48)
MAGNESIUM SERPL-MCNC: 2.3 MG/DL (ref 1.6–2.6)
MCH RBC QN AUTO: 29.7 PG (ref 27–31)
MCHC RBC AUTO-ENTMCNC: 32 G/DL (ref 32–36)
MCV RBC AUTO: 93 FL (ref 82–98)
MONOCYTES # BLD AUTO: 0.3 K/UL (ref 0.3–1)
MONOCYTES NFR BLD: 4.6 % (ref 4–15)
NEUTROPHILS # BLD AUTO: 4.7 K/UL (ref 1.8–7.7)
NEUTROPHILS NFR BLD: 83.3 % (ref 38–73)
NRBC BLD-RTO: 0 /100 WBC
PHOSPHATE SERPL-MCNC: 1.3 MG/DL (ref 2.7–4.5)
PLATELET # BLD AUTO: 169 K/UL (ref 150–450)
PMV BLD AUTO: 9.5 FL (ref 9.2–12.9)
POTASSIUM SERPL-SCNC: 3.7 MMOL/L (ref 3.5–5.1)
PTH-INTACT SERPL-MCNC: 451.1 PG/ML (ref 9–77)
RBC # BLD AUTO: 2.96 M/UL (ref 4.6–6.2)
SODIUM SERPL-SCNC: 139 MMOL/L (ref 136–145)
TACROLIMUS BLD-MCNC: 2.8 NG/ML (ref 5–15)
WBC # BLD AUTO: 5.61 K/UL (ref 3.9–12.7)

## 2025-03-10 PROCEDURE — 83735 ASSAY OF MAGNESIUM: CPT | Performed by: INTERNAL MEDICINE

## 2025-03-10 PROCEDURE — 99999 PR PBB SHADOW E&M-EST. PATIENT-LVL III: CPT | Mod: PBBFAC,,,

## 2025-03-10 PROCEDURE — 36415 COLL VENOUS BLD VENIPUNCTURE: CPT | Performed by: INTERNAL MEDICINE

## 2025-03-10 PROCEDURE — 85025 COMPLETE CBC W/AUTO DIFF WBC: CPT | Performed by: INTERNAL MEDICINE

## 2025-03-10 PROCEDURE — 83970 ASSAY OF PARATHORMONE: CPT | Performed by: INTERNAL MEDICINE

## 2025-03-10 PROCEDURE — 80069 RENAL FUNCTION PANEL: CPT | Performed by: INTERNAL MEDICINE

## 2025-03-10 PROCEDURE — 80197 ASSAY OF TACROLIMUS: CPT | Performed by: INTERNAL MEDICINE

## 2025-03-10 PROCEDURE — 82306 VITAMIN D 25 HYDROXY: CPT | Performed by: INTERNAL MEDICINE

## 2025-03-10 RX ORDER — TACROLIMUS 1 MG/1
7 CAPSULE ORAL EVERY 12 HOURS
Qty: 300 CAPSULE | Refills: 11 | Status: SHIPPED | OUTPATIENT
Start: 2025-03-10

## 2025-03-10 NOTE — TELEPHONE ENCOUNTER
Pt called to r/s appt today-explained to pt we do not typically r/s first visit appts post transplant. Pt stated he would try to come in between 10-11 pm. Stated he would go to labs tomorrow.

## 2025-03-10 NOTE — TELEPHONE ENCOUNTER
----- Message from Mitchell Valles MD sent at 3/10/2025  1:59 PM CDT -----  Increase to 7 mg PO bid and tell the patient to take 2 mg now  ----- Message -----  From: Joel, [facility] Interface  Sent: 3/10/2025  11:19 AM CDT  To: Mitchell Valles MD

## 2025-03-10 NOTE — PROGRESS NOTES
Clinic Note: First Return to Clinic Post-  Kidney Transplant    Misael Owens  is a 55 y.o. male  S/p   RIGHT KIDNEY   transplant on 3/5/2025 (Kidney) for Malignant Hypertension.      Discharge Course (Issues/Concerns): Patient reports doing well since discharge     Objective:   Vitals:    03/10/25 1116   BP: (!) 154/82   Pulse: 95   Temp: (!) 95 °F (35 °C)     Met with patient and his caregiver in the clinic to review current medication list.     Current Medications[1]    Pharmacy Interventions/Recommendations:     1) Graft Function & Immunosuppression Issues: patient has been putting out ~900 cc - renal function panel and tacrolimus level pending. Current immunos: Tac 5 mg bid, MMF 1000 mg BID, Pred taper    2) Opportunistic Infection prophylaxis:   PCP ppx: Bactrim until 9/2/25  CMV ppx: Valcyte until 6/4/25  Fungal ppx: none    3) Donor Serologies & Monitoring:     Donor CMV Status: Positive  Donor HCV Status: Negative  Donor HBcAb:    Donor HBV MINAL: Negative  Donor HCV MINAL: Negative      4) Pain Management & Bowel Regimen: pain has been well managed, patient take it scheduled for now. Patient reports normal bowel movements     5) Blood Pressure Management: blood pressure has been running 120s/80-90s, no medication needed at this time     6) Blood Sugar Management & Follow-up: Patient is not currently on anything for blood sugar management - renal function panel pending    7) Electrolyte Management: Not currently on any supplements - renal function panel and mag level pending    8) Osteoporosis Prevention Strategy (Liver Transplant): N/A    9) OTHER medication follow-up (patient assistance, held medications, etc): Patient plans to get multivitamin this week.     10) Reinforced medication education conducted in the hospital, including medication indications, dosing, administration, side effects, monitoring-- including timing of immunosuppressant levels.     Patient received their FIRST fill of medications from [facility].   Discussed the process for obtaining refills of medications, including verifying the dose and mailing address to have medications delivered.     Misael and his caregivers verbalized understanding and had the opportunity to ask questions.             [1]   Current Outpatient Medications   Medication Sig Dispense Refill    famotidine (PEPCID) 20 MG tablet Take 1 tablet (20 mg total) by mouth every evening. 30 tablet 0    multivitamin Tab Take 1 tablet by mouth once daily.      mycophenolate (CELLCEPT) 250 mg Cap Take 4 capsules (1,000 mg total) by mouth 2 (two) times daily. 240 capsule 11    oxyCODONE (ROXICODONE) 5 MG immediate release tablet Take 1 tablet (5 mg total) by mouth every 6 (six) hours as needed for Pain. 40 tablet 0    predniSONE (DELTASONE) 5 MG tablet Take by mouth everyday: 20 mg 3/7-3/13; 15 mg 3/14-3/20; 10 mg 3/21/2025-3/27/2025; 5 mg thereafter 75 tablet 11    sulfamethoxazole-trimethoprim 400-80mg (BACTRIM,SEPTRA) 400-80 mg per tablet Take 1 tablet by mouth every Mon, Wed, Fri. STOP 9/2/25 12 tablet 5    tacrolimus (PROGRAF) 1 MG Cap Take 5 capsules (5 mg total) by mouth every 12 (twelve) hours. 300 capsule 11    valGANciclovir (VALCYTE) 450 mg Tab Take 1 tablet (450 mg total) by mouth every Mon, Wed, Fri. STOP 6/4/25 12 tablet 2     No current facility-administered medications for this visit.     Facility-Administered Medications Ordered in Other Visits   Medication Dose Route Frequency Provider Last Rate Last Admin    lidocaine (PF) 10 mg/ml (1%) injection 10 mg  1 mL Intradermal Once Jay Sherman MD        mupirocin 2 % ointment   Nasal On Call Procedure Cherry Ocampo MD   Given at 03/02/20 0854    ondansetron injection 4 mg  4 mg Intravenous Q12H PRN Jay Sherman MD   4 mg at 03/05/25 1653    sodium chloride 0.9% flush 10 mL  10 mL Intravenous PRN Cherry Ocampo MD

## 2025-03-12 DIAGNOSIS — Z94.0 KIDNEY REPLACED BY TRANSPLANT: Primary | ICD-10-CM

## 2025-03-12 DIAGNOSIS — Z91.89 PERSONAL HISTORY OF POISONING, PRESENTING HAZARDS TO HEALTH: ICD-10-CM

## 2025-03-12 DIAGNOSIS — D63.1 ANEMIA OF RENAL DISEASE: ICD-10-CM

## 2025-03-12 DIAGNOSIS — N18.9 ANEMIA OF RENAL DISEASE: ICD-10-CM

## 2025-03-12 DIAGNOSIS — N34.2 INFECTIVE URETHRITIS: ICD-10-CM

## 2025-03-12 DIAGNOSIS — Z57.8 EMPLOYEE EXPOSURE TO BODY FLUIDS: ICD-10-CM

## 2025-03-12 SDOH — SOCIAL DETERMINANTS OF HEALTH (SDOH): OCCUPATIONAL EXPOSURE TO OTHER RISK FACTORS: Z57.8

## 2025-03-13 NOTE — TELEPHONE ENCOUNTER
Message sent    ----- Message from Mitchell Valles MD sent at 3/13/2025 10:08 AM CDT -----  Please start  mg po tid today  Encourage hydration  ----- Message -----  From: [facility] [employer]  Sent: 3/13/2025   9:50 AM CDT  To: Mitchell Valles MD

## 2025-03-19 NOTE — ASSESSMENT & PLAN NOTE
Continued Stay SW/CM Assessment/Plan of Care Note       Active Substitute Decision Maker (SDM)       ELI OTOOLE Health Care Agent 1 - Niece   Primary Phone: [phone] (Mobile)  Home Phone: [phone]                     Progress note:  Update received that pt niece/activated HCPOA Eli is requesting SNF placement for pt.  left for Eli at 1438 asking for a return call. Stating that SW just needs to confirm with Eli that she is agreeable to  sending a broad referral to appropriate SNFs.     Call placed to SHANON Mora with [facility]. Update provided. Asking if [facility] has attempted to file for pt to have a corporate guardian as it appears that Eli is very frustrated with the situation. Abby stating she is fairly new to pt, but that she could investigate this.     Call from LAMAR Vizcarra with [facility]. She reports that Abby did update her on pt situation. Carmita stating that they could look in to filing for guardianship and protective placement but that they are in need of the Examining Psychologist Report. Psychology did recently see pt in February and indicated that pt lacks decision making capacity. Report would be needed in order to pursue legal guardianship/protective placement. SW stating that she will inquire about this.     SW also advising that the hospital will NOT file for guardianship at this time, especially due to pt frequent AMA discharges. Carmita voicing understanding.     Unfortunately, inpatient psychology is unable to complete reports for pts who are not having guardianship pursued by the hospital. Will discuss with Carmita that alternate arrangements will be needed.      Awaiting return call from Eli. Once confirmed that Eli is agreeable to referrals  to send to appropriate SNFs.    [facility]  LAMAR Olivares  Phone: [phone]  Fax: [phone]   SHANON Alicia  Phone: [phone]  Fax: [phone]    Addendum 1530: Call  "- See "prophylactic immunotherapy."    " back from Caitlyn. She confirms that she would like to pursue SNF placement for pt and also confirms that pt would need to be agreeable to discharge to a facility. LORENA to send referrals in an attempt to locate an appropriate facility. If pt is accepted to a SNF, LORENA will discuss this with pt at that time.     Caitlyn also advising that she anticipates that pt will leave AMA. She states that she would be agreeable to come pick pt up tomorrow, \"since it's his birthday.\" LORENA thanking Caitlyn for her support and advocacy for pt.     See /CM flowsheets for other objective data.    Disposition Recommendations:  Preliminary discharge destination: Planned Discharge Destination: Location not determined at this time  SW/CM recommendation for discharge: Hospice      Prior To Hospitalization:    Living Situation: Alone and residing at Apartment    .  Support Systems: Family members, Home Care Staff   Home Devices/Equipment: Home Oxygen (T), Nebulizer (T) [employer]          Mobility Assist Devices: Front-wheeled walker   Type of Service Prior to Hospitalization: Personal care worker (agency)               Patient/Family discharge goal (s):  Home     Resources provided:           Prior Function                Current Function  Last Filed Values       None            Therapy Recommendations for Discharge:   PT:      Last Filed Values       None          OT:       Last Filed Values       None          SLP:    Last Filed Values       None            Mobility Equipment Recommended for Discharge:        Barriers to Discharge  Identified Barriers to Discharge/Transition Planning: Medical necessity for acute care

## (undated) DEVICE — GOWN SURGICAL X-LARGE

## (undated) DEVICE — PACK KIDNEY TRANSPLANT CUSTOM

## (undated) DEVICE — INFLATOR ADVANTAGE ENCORE 26

## (undated) DEVICE — PACK CUSTOM UNIV BASIN SLI

## (undated) DEVICE — COVERS PROBE NR-48 STERILE

## (undated) DEVICE — SPONGE GAUZE 16PLY 4X4

## (undated) DEVICE — SUT 2-0 12-18IN SILK

## (undated) DEVICE — SUT 3-0 12-18IN SILK

## (undated) DEVICE — GAUZE SPONGE PEANUT STRL

## (undated) DEVICE — SUT 4-0 12-18IN SILK BLACK

## (undated) DEVICE — SUT SILK 3-0 STRANDS 30IN

## (undated) DEVICE — Device

## (undated) DEVICE — TOWEL OR XRAY WHITE 17X26IN

## (undated) DEVICE — VISE RADIFOCUS MULTI TORQUE

## (undated) DEVICE — TRAY CATH 1-LYR URIMTR 16FR

## (undated) DEVICE — ELECTRODE MEGADYNE RETURN DUAL

## (undated) DEVICE — DRAPE OPTIMA MAJOR PEDIATRIC

## (undated) DEVICE — PUNCH AORTIC 4.0MM 6/CASE

## (undated) DEVICE — SOL NS 1000CC

## (undated) DEVICE — GUIDEWIRE STF .035X180CM ANG

## (undated) DEVICE — KIT GLIDESHEATH SLEND 6FR 10CM

## (undated) DEVICE — CLIP SPRING 6MM

## (undated) DEVICE — BAND TR WITH INFLATOR

## (undated) DEVICE — APPLICATOR CHLORAPREP ORN 26ML

## (undated) DEVICE — LOOP VESSEL BLUE MAXI

## (undated) DEVICE — SUT SILK 2-0 STRANDS 30IN

## (undated) DEVICE — SUT MCRYL PLUS 4-0 PS2 27IN

## (undated) DEVICE — SOL 9P NACL IRR PIC IL

## (undated) DEVICE — PLUG CATHETER STERILE FOLEY

## (undated) DEVICE — CLIP MED TICALL

## (undated) DEVICE — CATH BLLN DORADO 6 X 4

## (undated) DEVICE — STAPLER SKIN PROXIMATE WIDE

## (undated) DEVICE — DRAPE SLUSH WARMER WITH DISC

## (undated) DEVICE — SUT PROLENE 5-0 36IN C-1

## (undated) DEVICE — PACK BASIC

## (undated) DEVICE — SET DECANTER MEDICHOICE

## (undated) DEVICE — SUT SILK 2-0 SH 18IN BLACK

## (undated) DEVICE — SUT D SPECIAL VICRYL 2-0

## (undated) DEVICE — SEE MEDLINE ITEM 152622

## (undated) DEVICE — SUT ETHILON 3-0 PS2 18 BLK

## (undated) DEVICE — GLOVE BIOGEL PI ORTHO PRO 7.5

## (undated) DEVICE — HEMOSTAT SURGICEL 4X8IN

## (undated) DEVICE — FOLEY BLLN 20FR 3WAY 5CC

## (undated) DEVICE — SET IRR URLGY 2LINE UNIV SPIKE

## (undated) DEVICE — INSERTS STEALTH FIBRA SIZE 1.

## (undated) DEVICE — SUT VICRYL PLUS 3-0 SH 18IN

## (undated) DEVICE — DRESSING SPONGE 8PLY 4X4 STRL

## (undated) DEVICE — SET MICROPUNCT 5FRMPIS-501

## (undated) DEVICE — AV VASCULAR ACCESS PACK

## (undated) DEVICE — SUT LIGACLIP SMALL XTRA

## (undated) DEVICE — TIP YANKAUERS BULB NO VENT

## (undated) DEVICE — SYR ONLY LUER LOCK 20CC

## (undated) DEVICE — SUT PDS BV 6-0

## (undated) DEVICE — DRESSING ABSRBNT ISLAND 3.6X8

## (undated) DEVICE — PACK SET UP CONVERTORS

## (undated) DEVICE — CATH GLIDE ANGLED 5FR 65CM

## (undated) DEVICE — COVER INSTR ELASTIC BAND 40X20

## (undated) DEVICE — SEE MEDLINE ITEM 153688

## (undated) DEVICE — COVER LIGHT HANDLE 80/CA

## (undated) DEVICE — ADHESIVE DERMABOND ADVANCED

## (undated) DEVICE — PENCIL ROCKER SWITCH 10FT CORD

## (undated) DEVICE — ELECTRODE REM PLYHSV RETURN 9

## (undated) DEVICE — SUT PROLENE 6-0 BV-1 30IN

## (undated) DEVICE — DRESSING TEGADERM 4.4X5IN

## (undated) DEVICE — SUT PROLENE 6-0 24 BV-1

## (undated) DEVICE — CLIPPER BLADE MOD 4406 (CAREF)

## (undated) DEVICE — SUT 1 36IN PDS II VIO MONO

## (undated) DEVICE — DECANTER FLUID TRNSF WHITE 9IN

## (undated) DEVICE — KIT INTRO MICRO NIT VSI 4FR

## (undated) DEVICE — HEMOSTAT SURGICEL NU-KNIT 6X9

## (undated) DEVICE — STOCKINET 4INX48